# Patient Record
Sex: MALE | Race: WHITE | NOT HISPANIC OR LATINO | Employment: PART TIME | URBAN - METROPOLITAN AREA
[De-identification: names, ages, dates, MRNs, and addresses within clinical notes are randomized per-mention and may not be internally consistent; named-entity substitution may affect disease eponyms.]

---

## 2020-10-23 ENCOUNTER — APPOINTMENT (EMERGENCY)
Dept: RADIOLOGY | Facility: HOSPITAL | Age: 70
End: 2020-10-23
Payer: MEDICARE

## 2020-10-23 ENCOUNTER — HOSPITAL ENCOUNTER (EMERGENCY)
Facility: HOSPITAL | Age: 70
Discharge: HOME | End: 2020-10-23
Attending: EMERGENCY MEDICINE | Admitting: EMERGENCY MEDICINE
Payer: MEDICARE

## 2020-10-23 ENCOUNTER — APPOINTMENT (EMERGENCY)
Dept: CT IMAGING | Facility: HOSPITAL | Age: 70
End: 2020-10-23
Payer: MEDICARE

## 2020-10-23 VITALS
TEMPERATURE: 97.4 F | RESPIRATION RATE: 19 BRPM | HEIGHT: 69 IN | HEART RATE: 66 BPM | DIASTOLIC BLOOD PRESSURE: 77 MMHG | SYSTOLIC BLOOD PRESSURE: 132 MMHG | WEIGHT: 215 LBS | OXYGEN SATURATION: 97 % | BODY MASS INDEX: 31.84 KG/M2

## 2020-10-23 DIAGNOSIS — I10 HIGH BLOOD PRESSURE: ICD-10-CM

## 2020-10-23 DIAGNOSIS — R42 LIGHTHEADEDNESS: Primary | ICD-10-CM

## 2020-10-23 LAB
ALBUMIN SERPL BCP-MCNC: 4.1 G/DL (ref 3.5–5)
ALP SERPL-CCNC: 76 U/L (ref 46–116)
ALT SERPL W P-5'-P-CCNC: 22 U/L (ref 12–78)
ANION GAP SERPL CALCULATED.3IONS-SCNC: 4 MMOL/L (ref 4–13)
APTT PPP: 29 SECONDS (ref 23–37)
AST SERPL W P-5'-P-CCNC: 18 U/L (ref 5–45)
ATRIAL RATE: 70 BPM
BASOPHILS # BLD AUTO: 0.06 THOUSANDS/ΜL (ref 0–0.1)
BASOPHILS NFR BLD AUTO: 1 % (ref 0–1)
BILIRUB SERPL-MCNC: 0.8 MG/DL (ref 0.2–1)
BUN SERPL-MCNC: 13 MG/DL (ref 5–25)
CALCIUM SERPL-MCNC: 8.8 MG/DL (ref 8.3–10.1)
CHLORIDE SERPL-SCNC: 105 MMOL/L (ref 100–108)
CO2 SERPL-SCNC: 32 MMOL/L (ref 21–32)
CREAT SERPL-MCNC: 1.14 MG/DL (ref 0.6–1.3)
EOSINOPHIL # BLD AUTO: 0.15 THOUSAND/ΜL (ref 0–0.61)
EOSINOPHIL NFR BLD AUTO: 3 % (ref 0–6)
ERYTHROCYTE [DISTWIDTH] IN BLOOD BY AUTOMATED COUNT: 11.9 % (ref 11.6–15.1)
GFR SERPL CREATININE-BSD FRML MDRD: 64 ML/MIN/1.73SQ M
GLUCOSE SERPL-MCNC: 127 MG/DL (ref 65–140)
HCT VFR BLD AUTO: 45.3 % (ref 36.5–49.3)
HGB BLD-MCNC: 15.4 G/DL (ref 12–17)
IMM GRANULOCYTES # BLD AUTO: 0.02 THOUSAND/UL (ref 0–0.2)
IMM GRANULOCYTES NFR BLD AUTO: 0 % (ref 0–2)
INR PPP: 1.07 (ref 0.84–1.19)
LYMPHOCYTES # BLD AUTO: 1.61 THOUSANDS/ΜL (ref 0.6–4.47)
LYMPHOCYTES NFR BLD AUTO: 30 % (ref 14–44)
MCH RBC QN AUTO: 32.5 PG (ref 26.8–34.3)
MCHC RBC AUTO-ENTMCNC: 34 G/DL (ref 31.4–37.4)
MCV RBC AUTO: 96 FL (ref 82–98)
MONOCYTES # BLD AUTO: 0.56 THOUSAND/ΜL (ref 0.17–1.22)
MONOCYTES NFR BLD AUTO: 11 % (ref 4–12)
NEUTROPHILS # BLD AUTO: 2.94 THOUSANDS/ΜL (ref 1.85–7.62)
NEUTS SEG NFR BLD AUTO: 55 % (ref 43–75)
NRBC BLD AUTO-RTO: 0 /100 WBCS
P AXIS: 54 DEGREES
PLATELET # BLD AUTO: 205 THOUSANDS/UL (ref 149–390)
PMV BLD AUTO: 11.1 FL (ref 8.9–12.7)
POTASSIUM SERPL-SCNC: 3.9 MMOL/L (ref 3.5–5.3)
PR INTERVAL: 194 MS
PROT SERPL-MCNC: 7.5 G/DL (ref 6.4–8.2)
PROTHROMBIN TIME: 14 SECONDS (ref 11.6–14.5)
QRS AXIS: 39 DEGREES
QRSD INTERVAL: 98 MS
QT INTERVAL: 374 MS
QTC INTERVAL: 403 MS
RBC # BLD AUTO: 4.74 MILLION/UL (ref 3.88–5.62)
SODIUM SERPL-SCNC: 141 MMOL/L (ref 136–145)
T WAVE AXIS: 57 DEGREES
T4 FREE SERPL-MCNC: 1.21 NG/DL (ref 0.76–1.46)
TROPONIN I SERPL-MCNC: <0.02 NG/ML
TSH SERPL DL<=0.05 MIU/L-ACNC: 0.08 UIU/ML (ref 0.36–3.74)
VENTRICULAR RATE: 70 BPM
WBC # BLD AUTO: 5.34 THOUSAND/UL (ref 4.31–10.16)

## 2020-10-23 PROCEDURE — 80053 COMPREHEN METABOLIC PANEL: CPT | Performed by: EMERGENCY MEDICINE

## 2020-10-23 PROCEDURE — G1004 CDSM NDSC: HCPCS

## 2020-10-23 PROCEDURE — 84484 ASSAY OF TROPONIN QUANT: CPT | Performed by: EMERGENCY MEDICINE

## 2020-10-23 PROCEDURE — 84443 ASSAY THYROID STIM HORMONE: CPT | Performed by: EMERGENCY MEDICINE

## 2020-10-23 PROCEDURE — 70498 CT ANGIOGRAPHY NECK: CPT

## 2020-10-23 PROCEDURE — 84439 ASSAY OF FREE THYROXINE: CPT | Performed by: EMERGENCY MEDICINE

## 2020-10-23 PROCEDURE — 96360 HYDRATION IV INFUSION INIT: CPT

## 2020-10-23 PROCEDURE — 85025 COMPLETE CBC W/AUTO DIFF WBC: CPT | Performed by: EMERGENCY MEDICINE

## 2020-10-23 PROCEDURE — 71045 X-RAY EXAM CHEST 1 VIEW: CPT

## 2020-10-23 PROCEDURE — 96361 HYDRATE IV INFUSION ADD-ON: CPT

## 2020-10-23 PROCEDURE — 70496 CT ANGIOGRAPHY HEAD: CPT

## 2020-10-23 PROCEDURE — 85730 THROMBOPLASTIN TIME PARTIAL: CPT | Performed by: EMERGENCY MEDICINE

## 2020-10-23 PROCEDURE — 36415 COLL VENOUS BLD VENIPUNCTURE: CPT | Performed by: EMERGENCY MEDICINE

## 2020-10-23 PROCEDURE — 93005 ELECTROCARDIOGRAM TRACING: CPT

## 2020-10-23 PROCEDURE — 99285 EMERGENCY DEPT VISIT HI MDM: CPT | Performed by: EMERGENCY MEDICINE

## 2020-10-23 PROCEDURE — 85610 PROTHROMBIN TIME: CPT | Performed by: EMERGENCY MEDICINE

## 2020-10-23 PROCEDURE — 93010 ELECTROCARDIOGRAM REPORT: CPT | Performed by: INTERNAL MEDICINE

## 2020-10-23 PROCEDURE — 99285 EMERGENCY DEPT VISIT HI MDM: CPT

## 2020-10-23 RX ORDER — LEVOTHYROXINE SODIUM 112 UG/1
1 CAPSULE ORAL EVERY 24 HOURS
COMMUNITY
Start: 2020-07-30

## 2020-10-23 RX ORDER — LOVASTATIN 40 MG/1
1 TABLET ORAL EVERY 24 HOURS
COMMUNITY

## 2020-10-23 RX ORDER — FERROUS SULFATE 325(65) MG
TABLET ORAL EVERY 12 HOURS
COMMUNITY

## 2020-10-23 RX ORDER — LOSARTAN POTASSIUM 50 MG/1
1 TABLET ORAL EVERY 24 HOURS
COMMUNITY

## 2020-10-23 RX ADMIN — IOHEXOL 90 ML: 350 INJECTION, SOLUTION INTRAVENOUS at 08:14

## 2020-10-23 RX ADMIN — SODIUM CHLORIDE 1000 ML: 0.9 INJECTION, SOLUTION INTRAVENOUS at 06:49

## 2020-10-30 ENCOUNTER — TELEPHONE (OUTPATIENT)
Dept: NEUROLOGY | Facility: CLINIC | Age: 70
End: 2020-10-30

## 2021-02-26 ENCOUNTER — CONSULT (OUTPATIENT)
Dept: NEUROLOGY | Facility: CLINIC | Age: 71
End: 2021-02-26
Payer: MEDICARE

## 2021-02-26 VITALS
BODY MASS INDEX: 32.19 KG/M2 | WEIGHT: 218 LBS | DIASTOLIC BLOOD PRESSURE: 78 MMHG | SYSTOLIC BLOOD PRESSURE: 136 MMHG | HEART RATE: 59 BPM

## 2021-02-26 DIAGNOSIS — R42 LIGHTHEADEDNESS: ICD-10-CM

## 2021-02-26 DIAGNOSIS — R42 VERTIGO: Primary | ICD-10-CM

## 2021-02-26 PROCEDURE — 99203 OFFICE O/P NEW LOW 30 MIN: CPT | Performed by: PSYCHIATRY & NEUROLOGY

## 2021-02-26 RX ORDER — LEVOTHYROXINE SODIUM 137 UG/1
TABLET ORAL
COMMUNITY
Start: 2021-02-22 | End: 2021-03-31 | Stop reason: DRUGHIGH

## 2021-02-26 RX ORDER — LATANOPROST 50 UG/ML
SOLUTION/ DROPS OPHTHALMIC
COMMUNITY
Start: 2021-02-10

## 2021-02-26 NOTE — PROGRESS NOTES
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 [employer]  Pt had 2 episodes in same day  First episode several min and pt took water and resolved  Once back to re stocking, he had second event lasting longer in duration  No prior history of vertigo  Feeling off balance and also diff with navigating  Pt taken to er at [address] right from work place  Pt had cta head and neck in er without evidence of acute intracranial abn  No hemodyn significant stenosis at the cervical carotid bifurcation or the vertebral artery origins  No large vessel occlusion or high grade stenosis  Pt notes sxs resolved  Pt with some int tinnitus  Pt to complete cns work up with mri head with attn to iacs  Pt to follow up after study  Pt aware to go to er for any any other associated sxs with the vertigo, ie weaknes, diplopia, headache or focal neuro sxs       Diagnoses and all orders for this visit:    Vertigo  -     MRI brain IAC wo contrast; Future    Lightheadedness  -     Ambulatory referral to Neurology    Other orders  -     levothyroxine 137 mcg tablet  -     latanoprost (XALATAN) 0 005 % ophthalmic solution; place 1 drop into both eyes at bedtime           Subjective:    HPI    Pt is a 66 yo f with pmh of hyperchol, htn, hypothyroidism , and hailey who presents today for eval of vertigo  Pt had episode on oct 23 2020 while working at [employer]  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, and had acute onset of lightheadness and feeling off balance  Pt notes initially sxs lasted a few minutes and pt was able to go to work room and get fluids  Pt returned to re stocking and same issue recurred with some vertiginous sensation and more peristent sxs without resolution  Pt had some diff with ambulation but able to articulate his sxs and ambulance called to the store     Pt notes sxs subsequently resolved  No recent recurrence  No prior vertigo in yrs past   Pt notes int tinnitus  No hearing loss per pt  Pt denies any uri sxs  No recent infections  No falls or trips  No loc  No sz  No change in vision  No loss of vision  No change in bowel or bladder  No change in speech or swallowing  No ha or n or v  Pt was seen in er,  bp ok  Pt had cta head and neck done in er  No acute intracranial abnormality  Moderate cerebral chronic microangiopathic disease  No cervical or intracranial large vessel occlusion or high-grade stenosis  No hemodynamically significant stenosis at the cervical carotid bifurcations or the vertebral artery origins  Rev in detail results of cta study  Pt notes he quit tob in 2009  Pt notes he usually is fairly well hydrated while working  Appears to have had a positional component to his sxs with the stocking of shelves  Pt recommended to complete neuro work up with mri head with attn to iacs  If any recurrence without any other focality, rec ent eval as well  Pt currently feels at his baseline  Pt recalls seeing a vascular doc several yrs ago but unsure of reason at that time  No history of cva or tia like sxs  Rev with pt indications for return to er in future with any focal neuro sxs, headache, weakness, change in vision , speech or swallowing ,etc       The following portions of the patient's history were reviewed and updated as appropriate: allergies, current medications, past family history, past medical history, past social history, past surgical history and problem list and ,med rec and ros rev  Objective:    Blood pressure 136/78, pulse 59, weight 98 9 kg (218 lb)  Physical Exam  Constitutional:       General: He is not in acute distress  Appearance: He is not ill-appearing  Eyes:      General: Lids are normal       Extraocular Movements: Extraocular movements intact        Pupils: Pupils are equal, round, and reactive to light    Musculoskeletal:      Right lower leg: No edema  Left lower leg: No edema  Neurological:      Mental Status: He is alert  Coordination: Coordination is intact  Deep Tendon Reflexes: Strength normal and reflexes are normal and symmetric  Psychiatric:         Speech: Speech normal          Neurological Exam  Mental Status  Alert  Recent and remote memory are intact  Speech is normal  Language is fluent with no aphasia  Fund of knowledge is appropriate for level of education  Cranial Nerves  CN II: Visual acuity is normal  Visual fields full to confrontation  CN III, IV, VI: Extraocular movements intact bilaterally  Normal lids and orbits bilaterally  Pupils equal round and reactive to light bilaterally  CN V: Facial sensation is normal   CN VII: Full and symmetric facial movement  CN VIII: Hearing is normal   CN IX, X: Palate elevates symmetrically  Normal gag reflex  CN XI: Shoulder shrug strength is normal   CN XII: Tongue midline without atrophy or fasciculations  Motor  Normal muscle bulk throughout  Normal muscle tone  No abnormal involuntary movements  Strength is 5/5 throughout all four extremities  Sensory  Sensation is intact to light touch, pinprick, vibration and proprioception in all four extremities  Reflexes  Deep tendon reflexes are 2+ and symmetric in all four extremities with downgoing toes bilaterally  Coordination  Finger-to-nose, rapid alternating movements and heel-to-shin normal bilaterally without dysmetria  Gait  Normal casual, toe, heel and tandem gait  ROS:    Review of Systems   Constitutional: Negative  Negative for appetite change and fever  HENT: Positive for tinnitus  Negative for hearing loss, trouble swallowing and voice change  Eyes: Negative  Negative for photophobia and pain  Respiratory: Negative  Negative for shortness of breath  Cardiovascular: Negative  Negative for palpitations  Gastrointestinal: Negative  Negative for nausea and vomiting  Endocrine: Negative  Negative for cold intolerance  Genitourinary: Negative  Negative for dysuria, frequency and urgency  Musculoskeletal: Positive for gait problem  Negative for myalgias and neck pain  Skin: Negative  Negative for rash  Neurological: Positive for dizziness and light-headedness  Negative for tremors, seizures, syncope, facial asymmetry, speech difficulty, weakness, numbness and headaches  Hematological: Negative  Does not bruise/bleed easily  Psychiatric/Behavioral: Negative  Negative for confusion, hallucinations and sleep disturbance

## 2021-02-26 NOTE — ASSESSMENT & PLAN NOTE
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  Pt had 2 episodes in same day  First episode several min and pt took water and resolved  Once back to re stocking, he had second event lasting longer in duration  No prior history of vertigo  Feeling off balance and also diff with navigating  Pt taken to er at [address] right from work place  Pt had cta head and neck in er without evidence of acute intracranial abn    No hemodyn significant stenosis at the cervical carotid bifurcation or the vertebral artery origins  No large vessel occlusion or high grade stenosis  Pt notes sxs resolved  Pt with some int tinnitus  Pt to complete cns work up with mri head with attn to iacs  Pt to follow up after study  Pt aware to go to er for any any other associated sxs with the vertigo, ie weaknes, diplopia, headache or focal neuro sxs

## 2021-03-10 ENCOUNTER — TELEPHONE (OUTPATIENT)
Dept: NEUROLOGY | Facility: CLINIC | Age: 71
End: 2021-03-10

## 2021-03-10 NOTE — TELEPHONE ENCOUNTER
Patient questioning if he needed labwork prior to his MRI on 3/17  Advised him since he is not getting contrast, he does not need to get labs done  Patient verbalized understanding

## 2021-03-17 ENCOUNTER — HOSPITAL ENCOUNTER (OUTPATIENT)
Dept: MRI IMAGING | Facility: HOSPITAL | Age: 71
Discharge: HOME | End: 2021-03-17
Attending: PSYCHIATRY & NEUROLOGY
Payer: MEDICARE

## 2021-03-17 DIAGNOSIS — R42 VERTIGO: ICD-10-CM

## 2021-03-17 PROCEDURE — 70551 MRI BRAIN STEM W/O DYE: CPT

## 2021-03-17 PROCEDURE — G1004 CDSM NDSC: HCPCS

## 2021-03-22 ENCOUNTER — TELEPHONE (OUTPATIENT)
Dept: NEUROLOGY | Facility: CLINIC | Age: 71
End: 2021-03-22

## 2021-03-22 NOTE — TELEPHONE ENCOUNTER
Patient returned call  I advised him of results and recommendations  Patient verbalized understanding  MRI results faxed to PCP

## 2021-03-22 NOTE — TELEPHONE ENCOUNTER
Called and Left a message on pt's answering machine for a call back    Copy of MRI sent to PCP on file

## 2021-03-22 NOTE — TELEPHONE ENCOUNTER
----- Message from Tony Nair MD sent at 3/22/2021  6:41 AM EDT -----  Nursing, Let pt know mri head no abn seen in the posterior fossa or iacs  Chronic small vessel disease and mild volume loss noted  To consider baby asa for small vessel changes if ok with pcp  Small vessel changes unrelated to his vertigo  Please send copy of mri to pcp, she is not in epic system

## 2021-03-23 NOTE — TELEPHONE ENCOUNTER
Patient called regarding recommendation to take baby aspirin  Patient was told to get his PCP's approval on this recommendation (see below)  Patient reports his PCP returned his call and told him that they are deferring approval to   Dr Dejah Bar  I read recommendation to patient again and confirmed that the PCP needs to give their approval  Patient verbalized understanding and will follow up with his PCP

## 2021-03-25 ENCOUNTER — TELEPHONE (OUTPATIENT)
Dept: NEUROLOGY | Facility: CLINIC | Age: 71
End: 2021-03-25

## 2021-03-25 NOTE — TELEPHONE ENCOUNTER
Left message confirming that appointment with Dr Tr Giron on 3/31/21 at 8 am in the [facility] office  Asking for patient to return the call the confirm that they will be keeping the appointment  Please discuss and document

## 2021-03-31 ENCOUNTER — OFFICE VISIT (OUTPATIENT)
Dept: NEUROLOGY | Facility: CLINIC | Age: 71
End: 2021-03-31
Payer: MEDICARE

## 2021-03-31 VITALS
DIASTOLIC BLOOD PRESSURE: 68 MMHG | SYSTOLIC BLOOD PRESSURE: 114 MMHG | HEART RATE: 68 BPM | WEIGHT: 229 LBS | BODY MASS INDEX: 33.82 KG/M2

## 2021-03-31 DIAGNOSIS — R42 VERTIGO: Primary | ICD-10-CM

## 2021-03-31 PROCEDURE — 99213 OFFICE O/P EST LOW 20 MIN: CPT | Performed by: PSYCHIATRY & NEUROLOGY

## 2021-03-31 RX ORDER — LEVOTHYROXINE SODIUM 112 UG/1
112 TABLET ORAL EVERY MORNING
COMMUNITY
Start: 2021-03-18

## 2021-03-31 NOTE — ASSESSMENT & PLAN NOTE
Pt here for neuro follow up  Pt notes near resolution of his vertigo  Now only very briefly and usually with change of position  Pt notes no falls or trips  No infections  No hospitalizations  Exam stable  Pt had updated mri head on 3/17/21 and evidence of small vessel disease, no acute pathology in iacs  No sinus disease  Rev study with pt in detail  Pt to follow up prn  Pt on asa for small vessel changes  Pt also checked with pcp office as well

## 2021-03-31 NOTE — PROGRESS NOTES
Patient ID: Sylvie Salinas is a 65 y o  male  Assessment/Plan:    Vertigo  Pt here for neuro follow up  Pt notes near resolution of his vertigo  Now only very briefly and usually with change of position  Pt notes no falls or trips  No infections  No hospitalizations  Exam stable  Pt had updated mri head on 3/17/21 and evidence of small vessel disease, no acute pathology in iacs  No sinus disease  Rev study with pt in detail  Pt to follow up prn  Pt on asa for small vessel changes  Pt also checked with pcp office as well       Diagnoses and all orders for this visit:    Vertigo    Other orders  -     levothyroxine 112 mcg tablet; Take 112 mcg by mouth every morning Take on an empty stomach           Subjective:    HPI    Pt is a 64 yo f with pmh of hyperchol, htn, hypothyroidism , and hailey who presents today for eval of vertigo  Pt last seen 2/26/21  Per my last note"  Pt had episode on oct 23 2020 while working at [employer]  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, and had acute onset of lightheadness and feeling off balance  Pt notes initially sxs lasted a few minutes and pt was able to go to work room and get fluids  Pt returned to re stocking and same issue recurred with some vertiginous sensation and more peristent sxs without resolution  Pt had some diff with ambulation but able to articulate his sxs and ambulance called to the store  Pt notes sxs subsequently resolved  No recent recurrence  No prior vertigo in yrs past   Pt notes int tinnitus  No hearing loss per pt  Pt was seen in er,  bp ok  Pt had cta head and neck done in er  No acute intracranial abnormality   Moderate cerebral chronic microangiopathic disease  No cervical or intracranial large vessel occlusion or high-grade stenosis  No hemodynamically significant stenosis at the cervical carotid bifurcations or the vertebral artery origins  Rev in detail results of cta study     Pt notes he quit tob in 2009    Pt notes he usually is fairly well hydrated while working  Appears to have had a positional component to his sxs with the stocking of shelves "    Kept above detail of initial consultation  Pt notes no new issues since last visit in feb  Pt only notes occ brief dizziness with turning  sxs very short lived  No other associated sxs  Pt denies any new sxs  No falls or trips  No change in bowel or bladder  No LOC, no seizure  No change in speech or swallowing  No change in vision  No loss of vision  No diplopia  No loss of vision  No headache, no nausea or vomiting  No recent hospitalizations  No recent infections  No fever or chills  No cough or sob  Pt notes close covid contacts  Pt well aware of cdc recommendations in setting of covid  Pt had mri head 3/17/21 with attn to iacs  No acute intracranial pathology  Chronic microangiopathy  Unremarkable noncontrast evaluation of the internal auditory canals  Rev study in detail with pt  Pt denies any tia or cva like sxs  Pt feels well  Pt has added asa daily due to small vessel changes on mri  Pt also called pcp to discuss as well  Per pt no medical contraindications to asa  No posterior fossa pathology seen  Brief vertigo likely related to possible bpv  If recurrence, rec ent eval as well                The following portions of the patient's history were reviewed and updated as appropriate: allergies, current medications, past family history, past medical history, past social history, past surgical history and problem list and med rec and ros rev  Objective:    Blood pressure 114/68, pulse 68, weight 104 kg (229 lb)  Physical Exam  Constitutional:       General: He is not in acute distress  Appearance: He is not ill-appearing  Eyes:      General: Lids are normal       Extraocular Movements: Extraocular movements intact  Pupils: Pupils are equal, round, and reactive to light     Musculoskeletal:      Right lower leg: No edema  Left lower leg: No edema  Neurological:      Mental Status: He is alert  Deep Tendon Reflexes: Strength normal and reflexes are normal and symmetric  Psychiatric:         Speech: Speech normal          Neurological Exam  Mental Status  Alert  Recent and remote memory are intact  Speech is normal  Language is fluent with no aphasia  Attention and concentration are normal     Cranial Nerves  CN II: Visual acuity is normal  Visual fields full to confrontation  Right funduscopic exam: disc intact  Left funduscopic exam: disc intact  CN III, IV, VI: Extraocular movements intact bilaterally  Normal lids and orbits bilaterally  Pupils equal round and reactive to light bilaterally  CN V: Facial sensation is normal   CN VII: Full and symmetric facial movement  CN VIII: Hearing is normal   CN IX, X: Palate elevates symmetrically  Normal gag reflex  CN XI: Shoulder shrug strength is normal   CN XII: Tongue midline without atrophy or fasciculations  Motor  Normal muscle bulk throughout  Normal muscle tone  No abnormal involuntary movements  Strength is 5/5 throughout all four extremities  Sensory  Sensation is intact to light touch, pinprick, vibration and proprioception in all four extremities  Reflexes  Deep tendon reflexes are 2+ and symmetric in all four extremities with downgoing toes bilaterally  Coordination  Right: Finger-to-nose normal  Rapid alternating movement normal   Left: Finger-to-nose normal  Rapid alternating movement normal     Gait  Normal casual, toe, heel and tandem gait  ROS:    Review of Systems   Constitutional: Negative  Negative for appetite change and fever  HENT: Negative  Negative for hearing loss, tinnitus, trouble swallowing and voice change  Eyes: Negative  Negative for photophobia and pain  Respiratory: Negative  Negative for shortness of breath  Cardiovascular: Negative  Negative for palpitations     Gastrointestinal: Negative  Negative for nausea and vomiting  Endocrine: Negative  Negative for cold intolerance  Genitourinary: Negative  Negative for dysuria, frequency and urgency  Musculoskeletal: Negative  Negative for myalgias and neck pain  Skin: Negative  Negative for rash  Neurological: Negative  Negative for dizziness, tremors, seizures, syncope, facial asymmetry, speech difficulty, weakness, light-headedness, numbness and headaches  Hematological: Negative  Does not bruise/bleed easily  Psychiatric/Behavioral: Negative  Negative for confusion, hallucinations and sleep disturbance

## 2022-03-20 ENCOUNTER — HOSPITAL ENCOUNTER (EMERGENCY)
Facility: HOSPITAL | Age: 72
Discharge: HOME | End: 2022-03-20
Attending: EMERGENCY MEDICINE
Payer: MEDICARE

## 2022-03-20 VITALS
SYSTOLIC BLOOD PRESSURE: 156 MMHG | BODY MASS INDEX: 32.58 KG/M2 | DIASTOLIC BLOOD PRESSURE: 91 MMHG | OXYGEN SATURATION: 95 % | HEART RATE: 75 BPM | WEIGHT: 220 LBS | TEMPERATURE: 97.9 F | HEIGHT: 69 IN | RESPIRATION RATE: 16 BRPM

## 2022-03-20 DIAGNOSIS — R09.81 NASAL CONGESTION: Primary | ICD-10-CM

## 2022-03-20 PROCEDURE — 99283 EMERGENCY DEPT VISIT LOW MDM: CPT | Performed by: EMERGENCY MEDICINE

## 2022-03-20 PROCEDURE — 99282 EMERGENCY DEPT VISIT SF MDM: CPT

## 2022-03-20 RX ORDER — FLUTICASONE PROPIONATE 50 MCG
1 SPRAY, SUSPENSION (ML) NASAL DAILY
Qty: 16 G | Refills: 0 | Status: SHIPPED | OUTPATIENT
Start: 2022-03-20

## 2022-03-20 NOTE — Clinical Note
Flores Moreno was seen and treated in our emergency department on 3/20/2022  No restrictions            Diagnosis:     Servando Mcintyre  may return to work on return date  He may return on this date: 03/20/2022         If you have any questions or concerns, please don't hesitate to call        Vicky Childress, DO    ______________________________           _______________          _______________  Hospital Representative                              Date                                Time

## 2022-03-20 NOTE — DISCHARGE INSTRUCTIONS
Please follow up with your primary care provider for further care, if symptoms worsen please return to the emergency department

## 2022-03-20 NOTE — ED PROVIDER NOTES
History  Chief Complaint   Patient presents with    Nasal Congestion     started yesterday, concerned about new covid variant; denies fever/cough     66-year-old male past medical history of hyperlipidemia, hypertension presents for evaluation of nasal congestion x1 day, no chest pain, no shortness of breath, no nausea, no vomiting, no diarrhea, no sick contacts  No fever at home  Patient was worried about the new COVID variant that he read about and came in for evaluation prior to going to work at  [employer]  No medications taken prior to arrival   COVID positive in 2022 (22)          Prior to Admission Medications   Prescriptions Last Dose Informant Patient Reported? Taking? Cyanocobalamin 1000 MCG SUBL   Yes No   Sig: every 24 hours   Levothyroxine Sodium 112 MCG CAPS   Yes No   Sig: Take 1 capsule by mouth every 24 hours   ferrous sulfate 325 (65 Fe) mg tablet   Yes No   Sig: Every 12 hours   latanoprost (XALATAN) 0 005 % ophthalmic solution   Yes No   Sig: place 1 drop into both eyes at bedtime   levothyroxine 112 mcg tablet   Yes No   Sig: Take 112 mcg by mouth every morning Take on an empty stomach   losartan (COZAAR) 50 mg tablet   Yes No   Sig: Take 1 tablet by mouth every 24 hours   lovastatin (MEVACOR) 40 MG tablet   Yes No   Sig: Take 1 tablet by mouth every 24 hours      Facility-Administered Medications: None       Past Medical History:   Diagnosis Date    Disease of thyroid gland     Hyperlipidemia     Hypertension        Past Surgical History:   Procedure Laterality Date    HERNIA REPAIR      REPLACEMENT TOTAL KNEE BILATERAL         History reviewed  No pertinent family history  I have reviewed and agree with the history as documented      E-Cigarette/Vaping     E-Cigarette/Vaping Substances     Social History     Tobacco Use    Smoking status: Former Smoker     Quit date: 10/23/2009     Years since quittin 4    Smokeless tobacco: Never Used   Substance Use Topics    Alcohol use: Yes     Comment: socially    Drug use: Never       Review of Systems   Constitutional: Negative for chills and fever  HENT: Positive for congestion  Negative for rhinorrhea and sore throat  Respiratory: Negative for cough and shortness of breath  Cardiovascular: Negative for chest pain and palpitations  Gastrointestinal: Negative for abdominal pain, nausea and vomiting  Genitourinary: Negative for dysuria, frequency and urgency  Physical Exam  Physical Exam  Vitals and nursing note reviewed  Constitutional:       Appearance: He is well-developed  HENT:      Head: Normocephalic and atraumatic  Right Ear: Tympanic membrane normal       Left Ear: Tympanic membrane normal       Nose: No congestion or rhinorrhea  Mouth/Throat:      Pharynx: No oropharyngeal exudate or posterior oropharyngeal erythema  Cardiovascular:      Rate and Rhythm: Normal rate and regular rhythm  Heart sounds: Normal heart sounds  Pulmonary:      Effort: Pulmonary effort is normal       Breath sounds: Normal breath sounds  Abdominal:      General: There is no distension  Palpations: Abdomen is soft  Tenderness: There is no abdominal tenderness  Skin:     General: Skin is warm and dry  Neurological:      Mental Status: He is alert and oriented to person, place, and time           Vital Signs  ED Triage Vitals [03/20/22 0443]   Temperature Pulse Respirations Blood Pressure SpO2   97 9 °F (36 6 °C) 75 16 156/91 95 %      Temp Source Heart Rate Source Patient Position - Orthostatic VS BP Location FiO2 (%)   Temporal Monitor Sitting Left arm --      Pain Score       No Pain           Vitals:    03/20/22 0443   BP: 156/91   Pulse: 75   Patient Position - Orthostatic VS: Sitting         Visual Acuity      ED Medications  Medications - No data to display    Diagnostic Studies  Results Reviewed     None                 No orders to display              Procedures  Procedures ED Course                                             MDM  Number of Diagnoses or Management Options  Nasal congestion  Diagnosis management comments: 76-year-old male presents for evaluation of nasal congestion otherwise asymptomatic, does have allergies that he typically gets around this time of the year, had COVID less than 3 months ago will defer COVID testing at this time, discussed low suspicion for COVID infection given his symptoms and recent infection  Will discharge with symptomatic relief      Disposition  Final diagnoses:   Nasal congestion     Time reflects when diagnosis was documented in both MDM as applicable and the Disposition within this note     Time User Action Codes Description Comment    3/20/2022  4:52 AM Sunitha Del Real Add [R09 81] Nasal congestion       ED Disposition     ED Disposition Condition Date/Time Comment    Discharge Stable Sun Mar 20, 2022  4:54 AM Christine Baca discharge to home/self care              Follow-up Information     Follow up With Specialties Details Why Contact Info Additional Information    Basia Delcid DO Family Medicine Schedule an appointment as soon as possible for a visit   [facility] [address] [facility] Emergency Department Emergency Medicine  If symptoms worsen [address] [address] [phone] Pod [facility] [address] Emergency Department, [address] [facility] , Tamir [address]          Discharge Medication List as of 3/20/2022  4:56 AM      START taking these medications    Details   fluticasone (FLONASE) 50 mcg/act nasal spray 1 spray into each nostril daily, Starting Sun 3/20/2022, Normal         CONTINUE these medications which have NOT CHANGED    Details   Cyanocobalamin 1000 MCG SUBL every 24 hours, Starting Thu 7/30/2020, Historical Med      ferrous sulfate 325 (65 Fe) mg tablet Every 12 hours, Historical Med latanoprost (XALATAN) 0 005 % ophthalmic solution place 1 drop into both eyes at bedtime, Historical Med      levothyroxine 112 mcg tablet Take 112 mcg by mouth every morning Take on an empty stomach, Starting u 3/18/2021, Historical Med      Levothyroxine Sodium 112 MCG CAPS Take 1 capsule by mouth every 24 hours, Starting u 7/30/2020, Historical Med      losartan (COZAAR) 50 mg tablet Take 1 tablet by mouth every 24 hours, Historical Med      lovastatin (MEVACOR) 40 MG tablet Take 1 tablet by mouth every 24 hours, Historical Med             No discharge procedures on file      PDMP Review     None          ED Provider  Electronically Signed by           Sonja Randolph DO  03/20/22 3168

## 2022-03-20 NOTE — Clinical Note
Babs Merritt was seen and treated in our emergency department on 3/20/2022  No restrictions            Diagnosis:     Clarisa Bose  may return to work on return date  He may return on this date: 03/20/2022         If you have any questions or concerns, please don't hesitate to call        Lanny Mercer DO    ______________________________           _______________          _______________  Hospital Representative                              Date                                Time

## 2023-06-10 ENCOUNTER — HOSPITAL ENCOUNTER (EMERGENCY)
Facility: HOSPITAL | Age: 73
Discharge: HOME | DRG: 643 | End: 2023-06-10
Attending: EMERGENCY MEDICINE | Admitting: EMERGENCY MEDICINE
Payer: MEDICARE

## 2023-06-10 ENCOUNTER — APPOINTMENT (EMERGENCY)
Dept: RADIOLOGY | Facility: HOSPITAL | Age: 73
DRG: 643 | End: 2023-06-10
Payer: MEDICARE

## 2023-06-10 ENCOUNTER — APPOINTMENT (EMERGENCY)
Dept: CT IMAGING | Facility: HOSPITAL | Age: 73
DRG: 643 | End: 2023-06-10
Payer: MEDICARE

## 2023-06-10 VITALS
SYSTOLIC BLOOD PRESSURE: 155 MMHG | OXYGEN SATURATION: 95 % | TEMPERATURE: 97.9 F | HEART RATE: 75 BPM | DIASTOLIC BLOOD PRESSURE: 86 MMHG | RESPIRATION RATE: 16 BRPM

## 2023-06-10 DIAGNOSIS — Z53.29 LEFT AGAINST MEDICAL ADVICE: ICD-10-CM

## 2023-06-10 DIAGNOSIS — E03.9 HYPOTHYROIDISM: ICD-10-CM

## 2023-06-10 DIAGNOSIS — Z91.14 NONCOMPLIANCE WITH MEDICATION REGIMEN: ICD-10-CM

## 2023-06-10 DIAGNOSIS — R41.3 MEMORY LOSS: Primary | ICD-10-CM

## 2023-06-10 LAB
2HR DELTA HS TROPONIN: 0 NG/L
ALBUMIN SERPL BCP-MCNC: 4.9 G/DL (ref 3.5–5)
ALP SERPL-CCNC: 54 U/L (ref 34–104)
ALT SERPL W P-5'-P-CCNC: 34 U/L (ref 7–52)
AMPHETAMINES SERPL QL SCN: NEGATIVE
ANION GAP SERPL CALCULATED.3IONS-SCNC: 8 MMOL/L (ref 4–13)
APAP SERPL-MCNC: <10 UG/ML (ref 10–20)
APTT PPP: 31 SECONDS (ref 23–37)
AST SERPL W P-5'-P-CCNC: 54 U/L (ref 13–39)
ATRIAL RATE: 60 BPM
BACTERIA UR QL AUTO: ABNORMAL /HPF
BARBITURATES UR QL: NEGATIVE
BASOPHILS # BLD AUTO: 0.03 THOUSANDS/ÂΜL (ref 0–0.1)
BASOPHILS NFR BLD AUTO: 1 % (ref 0–1)
BENZODIAZ UR QL: NEGATIVE
BILIRUB SERPL-MCNC: 2.66 MG/DL (ref 0.2–1)
BILIRUB UR QL STRIP: ABNORMAL
BUN SERPL-MCNC: 19 MG/DL (ref 5–25)
CALCIUM SERPL-MCNC: 9.7 MG/DL (ref 8.4–10.2)
CARDIAC TROPONIN I PNL SERPL HS: 6 NG/L
CARDIAC TROPONIN I PNL SERPL HS: 6 NG/L
CHLORIDE SERPL-SCNC: 101 MMOL/L (ref 96–108)
CLARITY UR: CLEAR
CO2 SERPL-SCNC: 30 MMOL/L (ref 21–32)
COCAINE UR QL: NEGATIVE
COLOR UR: ABNORMAL
CREAT SERPL-MCNC: 1.59 MG/DL (ref 0.6–1.3)
EOSINOPHIL # BLD AUTO: 0.04 THOUSAND/ÂΜL (ref 0–0.61)
EOSINOPHIL NFR BLD AUTO: 1 % (ref 0–6)
ERYTHROCYTE [DISTWIDTH] IN BLOOD BY AUTOMATED COUNT: 14.4 % (ref 11.6–15.1)
ETHANOL SERPL-MCNC: <10 MG/DL
FLUAV RNA RESP QL NAA+PROBE: NEGATIVE
FLUBV RNA RESP QL NAA+PROBE: NEGATIVE
GFR SERPL CREATININE-BSD FRML MDRD: 42 ML/MIN/1.73SQ M
GLUCOSE SERPL-MCNC: 118 MG/DL (ref 65–140)
GLUCOSE UR STRIP-MCNC: NEGATIVE MG/DL
HCT VFR BLD AUTO: 47 % (ref 36.5–49.3)
HGB BLD-MCNC: 15.4 G/DL (ref 12–17)
HGB UR QL STRIP.AUTO: NEGATIVE
HYALINE CASTS #/AREA URNS LPF: ABNORMAL /LPF
IMM GRANULOCYTES # BLD AUTO: 0.01 THOUSAND/UL (ref 0–0.2)
IMM GRANULOCYTES NFR BLD AUTO: 0 % (ref 0–2)
INR PPP: 1 (ref 0.84–1.19)
KETONES UR STRIP-MCNC: NEGATIVE MG/DL
LEUKOCYTE ESTERASE UR QL STRIP: ABNORMAL
LYMPHOCYTES # BLD AUTO: 1.14 THOUSANDS/ÂΜL (ref 0.6–4.47)
LYMPHOCYTES NFR BLD AUTO: 23 % (ref 14–44)
MCH RBC QN AUTO: 31.8 PG (ref 26.8–34.3)
MCHC RBC AUTO-ENTMCNC: 32.8 G/DL (ref 31.4–37.4)
MCV RBC AUTO: 97 FL (ref 82–98)
METHADONE UR QL: NEGATIVE
MONOCYTES # BLD AUTO: 0.42 THOUSAND/ÂΜL (ref 0.17–1.22)
MONOCYTES NFR BLD AUTO: 9 % (ref 4–12)
NEUTROPHILS # BLD AUTO: 3.29 THOUSANDS/ÂΜL (ref 1.85–7.62)
NEUTS SEG NFR BLD AUTO: 66 % (ref 43–75)
NITRITE UR QL STRIP: NEGATIVE
NON-SQ EPI CELLS URNS QL MICRO: ABNORMAL /HPF
NRBC BLD AUTO-RTO: 0 /100 WBCS
OPIATES UR QL SCN: NEGATIVE
OXYCODONE+OXYMORPHONE UR QL SCN: NEGATIVE
P AXIS: 75 DEGREES
PCP UR QL: NEGATIVE
PH UR STRIP.AUTO: 6 [PH]
PLATELET # BLD AUTO: 188 THOUSANDS/UL (ref 149–390)
PMV BLD AUTO: 10.6 FL (ref 8.9–12.7)
POTASSIUM SERPL-SCNC: 3.6 MMOL/L (ref 3.5–5.3)
PR INTERVAL: 252 MS
PROT SERPL-MCNC: 7.6 G/DL (ref 6.4–8.4)
PROT UR STRIP-MCNC: ABNORMAL MG/DL
PROTHROMBIN TIME: 13.9 SECONDS (ref 11.6–14.5)
QRS AXIS: 3 DEGREES
QRSD INTERVAL: 86 MS
QT INTERVAL: 448 MS
QTC INTERVAL: 448 MS
RBC # BLD AUTO: 4.84 MILLION/UL (ref 3.88–5.62)
RBC #/AREA URNS AUTO: ABNORMAL /HPF
RSV RNA RESP QL NAA+PROBE: NEGATIVE
SALICYLATES SERPL-MCNC: <5 MG/DL (ref 3–20)
SARS-COV-2 RNA RESP QL NAA+PROBE: NEGATIVE
SODIUM SERPL-SCNC: 139 MMOL/L (ref 135–147)
SP GR UR STRIP.AUTO: >=1.03 (ref 1–1.03)
T WAVE AXIS: 51 DEGREES
THC UR QL: NEGATIVE
TSH SERPL DL<=0.05 MIU/L-ACNC: 135.02 UIU/ML (ref 0.45–4.5)
UROBILINOGEN UR STRIP-ACNC: 8 MG/DL
VENTRICULAR RATE: 60 BPM
WBC # BLD AUTO: 4.93 THOUSAND/UL (ref 4.31–10.16)
WBC #/AREA URNS AUTO: ABNORMAL /HPF

## 2023-06-10 PROCEDURE — 85610 PROTHROMBIN TIME: CPT | Performed by: EMERGENCY MEDICINE

## 2023-06-10 PROCEDURE — 80307 DRUG TEST PRSMV CHEM ANLYZR: CPT | Performed by: EMERGENCY MEDICINE

## 2023-06-10 PROCEDURE — 84443 ASSAY THYROID STIM HORMONE: CPT | Performed by: EMERGENCY MEDICINE

## 2023-06-10 PROCEDURE — 99284 EMERGENCY DEPT VISIT MOD MDM: CPT

## 2023-06-10 PROCEDURE — 36415 COLL VENOUS BLD VENIPUNCTURE: CPT | Performed by: EMERGENCY MEDICINE

## 2023-06-10 PROCEDURE — 85025 COMPLETE CBC W/AUTO DIFF WBC: CPT | Performed by: EMERGENCY MEDICINE

## 2023-06-10 PROCEDURE — 80179 DRUG ASSAY SALICYLATE: CPT | Performed by: EMERGENCY MEDICINE

## 2023-06-10 PROCEDURE — 0241U HB NFCT DS VIR RESP RNA 4 TRGT: CPT | Performed by: EMERGENCY MEDICINE

## 2023-06-10 PROCEDURE — 71045 X-RAY EXAM CHEST 1 VIEW: CPT

## 2023-06-10 PROCEDURE — 84484 ASSAY OF TROPONIN QUANT: CPT | Performed by: EMERGENCY MEDICINE

## 2023-06-10 PROCEDURE — 85730 THROMBOPLASTIN TIME PARTIAL: CPT | Performed by: EMERGENCY MEDICINE

## 2023-06-10 PROCEDURE — 82077 ASSAY SPEC XCP UR&BREATH IA: CPT | Performed by: EMERGENCY MEDICINE

## 2023-06-10 PROCEDURE — 80143 DRUG ASSAY ACETAMINOPHEN: CPT | Performed by: EMERGENCY MEDICINE

## 2023-06-10 PROCEDURE — 80053 COMPREHEN METABOLIC PANEL: CPT | Performed by: EMERGENCY MEDICINE

## 2023-06-10 PROCEDURE — 93005 ELECTROCARDIOGRAM TRACING: CPT

## 2023-06-10 PROCEDURE — 70450 CT HEAD/BRAIN W/O DYE: CPT

## 2023-06-10 PROCEDURE — 81001 URINALYSIS AUTO W/SCOPE: CPT | Performed by: EMERGENCY MEDICINE

## 2023-06-10 PROCEDURE — 93010 ELECTROCARDIOGRAM REPORT: CPT | Performed by: INTERNAL MEDICINE

## 2023-06-10 RX ORDER — LEVOTHYROXINE SODIUM 112 UG/1
112 TABLET ORAL
Status: DISCONTINUED | OUTPATIENT
Start: 2023-06-11 | End: 2023-06-10

## 2023-06-10 RX ORDER — LEVOTHYROXINE SODIUM 112 UG/1
112 TABLET ORAL ONCE
Status: COMPLETED | OUTPATIENT
Start: 2023-06-10 | End: 2023-06-10

## 2023-06-10 RX ADMIN — LEVOTHYROXINE SODIUM 112 MCG: 112 TABLET ORAL at 16:40

## 2023-06-10 NOTE — ED NOTES
Provider at bedside with crisis worker  Provider educated pt on results  Provider educated pt on the need to take his thyroid medications  Pt verbalized understanding and reiterated the need to take thyroid medication  Pt denies any S/I,H/I,A/H,V/H and does not want to stay at the hospital  Pt verbalized he will take medications at home   Pt AO x3     Alonso Mejia, RN  06/10/23 [id number]

## 2023-06-10 NOTE — ED TRIAGE NOTES
"Per EMS \"pt drove to work at [employer], and co workers were worried about statements pt was saying  Per police pt states he was going to sell all his things because he wouldn't need them anymore and that he would run into traffic  While with EMS never made SI threats stating issues with living situation due to living with ex wife whom he cares about but he thinks if he does what she wants she would want him again\"  Pt denies SI/HI/AH/VH currently   When asked if pt feels safe at home pt states \"yes\" when asked if pt would feel comfortable going back home pt states \"yes I still care about her\" when asked about current SI pt states \"I wouldn't do anything to let her get off that easy but I'm just joking with that\" pt states \"stopping drinking, smoking and drugs months ago\"  "

## 2023-06-10 NOTE — ED NOTES
"Pt is a 74 y o  male who was brought to the ED with   Chief Complaint   Patient presents with   • Psychiatric Evaluation     See note   Patient brought to the ED via EMS from his job at [employer] ([place], [place])  with complaints of possible not feeling well, Patient reports  that he went to work and once he go there his manager  told him that he was not on the schdeule to work  Patient states \" she said Meeta Abreu supposed to be at work then she had someone else asked me some questions\" Patient reports that sometime he does get depressed thinking about the his marriage that is now over (patient and his ex-wife still live together), CIS discussed with patient about the statement patient made to his coworkers that he wanted to give his thing away Patient states \"I didnt mean that or say that\" Patient reports that his schedule 4days a week 5-p-9p and that he sometimes shows up very early or on the worng day, patient reports that this happens often  It seems that patient has issues with his short term memory, Melanie Schuler is unable to recall things from the past few days, but can recall his time in the [employer]  Patient also reports that he is sad about his car that is not running well  Patient denies S/I,H/I,A/H,V/H  Intake Assessment completed, Safety risk Assessment completed  CIS discussed what happened today with patient  Patient continues to deny S/I,H/I,A/H,V/H and reports that he does feel safe at home  CIS called [employer] and spoke with Alma Kenny () to obtain collateral information, Alma Kenny informed me that patient does show up for very early or on days when he is not scheduled  Today she called [place] [employer] cause patient showed up today and he looked tired and sleepy  But she would not disclose any further details about what happened today that prompted the call  CIS asked patient if CIS could call patient ex-wife, CIS called patient ex-wife Andree Aragon [phone] left VM   " Dave Lemon  Crisis Intervention Specialist II

## 2023-06-10 NOTE — ED PROVIDER NOTES
History  Chief Complaint   Patient presents with   • Psychiatric Evaluation     See note     This is a 75-year-old male who presents via ambulance from work for evaluation of confusion  Patient works at [employer] and showed up for work today but staff there states that he was not supposed to be at work today  He is oriented to person at this time but not place and month or year  No focal neurologic deficits on examination no signs of trauma patient denies any suicidal or homicidal ideation  History provided by:  Patient and EMS personnel  Medical Problem  Location:  Generalized  Quality:  Confusion  Severity:  Unable to specify  Onset quality:  Unable to specify  Timing:  Intermittent  Chronicity:  Recurrent  Context:  Confusion sent by ambulance from work for further evaluation      Prior to Admission Medications   Prescriptions Last Dose Informant Patient Reported? Taking? Cyanocobalamin 1000 MCG SUBL   Yes No   Sig: every 24 hours   Levothyroxine Sodium 112 MCG CAPS   Yes No   Sig: Take 1 capsule by mouth every 24 hours   ferrous sulfate 325 (65 Fe) mg tablet   Yes No   Sig: Every 12 hours   fluticasone (FLONASE) 50 mcg/act nasal spray   No No   Si spray into each nostril daily   latanoprost (XALATAN) 0 005 % ophthalmic solution   Yes No   Sig: place 1 drop into both eyes at bedtime   levothyroxine 112 mcg tablet   Yes No   Sig: Take 112 mcg by mouth every morning Take on an empty stomach   losartan (COZAAR) 50 mg tablet   Yes No   Sig: Take 1 tablet by mouth every 24 hours   lovastatin (MEVACOR) 40 MG tablet   Yes No   Sig: Take 1 tablet by mouth every 24 hours      Facility-Administered Medications: None       Past Medical History:   Diagnosis Date   • Disease of thyroid gland    • Hyperlipidemia    • Hypertension        Past Surgical History:   Procedure Laterality Date   • HERNIA REPAIR     • REPLACEMENT TOTAL KNEE BILATERAL         History reviewed  No pertinent family history    I have reviewed and agree with the history as documented  E-Cigarette/Vaping     E-Cigarette/Vaping Substances     Social History     Tobacco Use   • Smoking status: Former     Types: Cigarettes     Quit date: 10/23/2009     Years since quittin 6   • Smokeless tobacco: Never   Substance Use Topics   • Alcohol use: Yes     Comment: socially   • Drug use: Never       Review of Systems   Psychiatric/Behavioral: Negative for suicidal ideas  All other systems reviewed and are negative  Physical Exam  Physical Exam  Vitals and nursing note reviewed  Constitutional:       General: He is not in acute distress  Appearance: He is not ill-appearing, toxic-appearing or diaphoretic  HENT:      Right Ear: Tympanic membrane, ear canal and external ear normal       Left Ear: Tympanic membrane, ear canal and external ear normal       Nose: Nose normal       Mouth/Throat:      Mouth: Mucous membranes are moist    Eyes:      General:         Right eye: No discharge  Left eye: No discharge  Extraocular Movements: Extraocular movements intact  Pupils: Pupils are equal, round, and reactive to light  Cardiovascular:      Rate and Rhythm: Normal rate and regular rhythm  Pulses: Normal pulses  Heart sounds: No murmur heard  No friction rub  No gallop  Pulmonary:      Effort: Pulmonary effort is normal  No respiratory distress  Breath sounds: No stridor  No wheezing, rhonchi or rales  Abdominal:      General: There is no distension  Palpations: Abdomen is soft  Tenderness: There is no abdominal tenderness  There is no guarding or rebound  Musculoskeletal:         General: No swelling, tenderness, deformity or signs of injury  Normal range of motion  Cervical back: Neck supple  No rigidity or tenderness  Right lower leg: No edema  Left lower leg: No edema  Skin:     General: Skin is warm and dry  Findings: No erythema or rash     Neurological: General: No focal deficit present  Mental Status: He is alert  He is disoriented  Cranial Nerves: No cranial nerve deficit  Sensory: No sensory deficit        Coordination: Coordination normal    Psychiatric:      Comments: Very forgetful denies any suicidal ideation         Vital Signs  ED Triage Vitals [06/10/23 1211]   Temperature Pulse Respirations Blood Pressure SpO2   97 9 °F (36 6 °C) 69 16 (!) 150/101 96 %      Temp src Heart Rate Source Patient Position - Orthostatic VS BP Location FiO2 (%)   -- Monitor Sitting Left arm --      Pain Score       No Pain           Vitals:    06/10/23 1330 06/10/23 1430 06/10/23 1530 06/10/23 1600   BP: 155/90 142/86 164/88 155/86   Pulse: 63 61 64 75   Patient Position - Orthostatic VS: Sitting Sitting Sitting Sitting         Visual Acuity  Visual Acuity    Flowsheet Row Most Recent Value   L Pupil Size (mm) 3   R Pupil Size (mm) 3          ED Medications  Medications   levothyroxine tablet 112 mcg (has no administration in time range)       Diagnostic Studies  Results Reviewed     Procedure Component Value Units Date/Time    HS Troponin I 2hr [[id number]]  (Normal) Collected: 06/10/23 1515    Lab Status: Final result Specimen: Blood from Arm, Left Updated: 06/10/23 1614     hs TnI 2hr 6 ng/L      Delta 2hr hsTnI 0 ng/L     TSH [[id number]]  (Abnormal) Collected: 06/10/23 1259    Lab Status: Final result Specimen: Blood from Arm, Left Updated: 06/10/23 1531     TSH 3RD GENERATON 135 020 uIU/mL     HS Troponin I 4hr [[id number]]     Lab Status: No result Specimen: Blood     Rapid drug screen, urine [[id number]]  (Normal) Collected: 06/10/23 1326    Lab Status: Final result Specimen: Urine, Clean Catch Updated: 06/10/23 1422     Amph/Meth UR Negative     Barbiturate Ur Negative     Benzodiazepine Urine Negative     Cocaine Urine Negative     Methadone Urine Negative     Opiate Urine Negative     PCP Ur Negative     THC Urine Negative     Oxycodone Urine Negative Narrative:      FOR MEDICAL PURPOSES ONLY  IF CONFIRMATION NEEDED PLEASE CONTACT THE LAB WITHIN 5 DAYS      Drug Screen Cutoff Levels:  AMPHETAMINE/METHAMPHETAMINES  1000 ng/mL  BARBITURATES     200 ng/mL  BENZODIAZEPINES     200 ng/mL  COCAINE      300 ng/mL  METHADONE      300 ng/mL  OPIATES      300 ng/mL  PHENCYCLIDINE     25 ng/mL  THC       50 ng/mL  OXYCODONE      100 ng/mL    Urine Microscopic [[id number]]  (Abnormal) Collected: 06/10/23 1326    Lab Status: Final result Specimen: Urine, Clean Catch Updated: 06/10/23 1416     RBC, UA None Seen /hpf      WBC, UA 2-4 /hpf      Epithelial Cells Occasional /hpf      Bacteria, UA Occasional /hpf      Hyaline Casts, UA 1-2 /lpf     UA w Reflex to Microscopic w Reflex to Culture [[id number]]  (Abnormal) Collected: 06/10/23 1326    Lab Status: Final result Specimen: Urine, Clean Catch Updated: 06/10/23 1409     Color, UA Dark Yellow     Clarity, UA Clear     Specific Gravity, UA >=1 030     pH, UA 6 0     Leukocytes, UA Large     Nitrite, UA Negative     Protein, UA 30 (1+) mg/dl      Glucose, UA Negative mg/dl      Ketones, UA Negative mg/dl      Urobilinogen, UA 8 0 mg/dl      Bilirubin, UA Small     Occult Blood, UA Negative    Salicylate level [[id number]]  (Normal) Collected: 06/10/23 1259    Lab Status: Final result Specimen: Blood from Arm, Left Updated: 82/65/26 1467     Salicylate Lvl <5 mg/dL     Comprehensive metabolic panel [[id number]]  (Abnormal) Collected: 06/10/23 1259    Lab Status: Final result Specimen: Blood from Arm, Left Updated: 06/10/23 1346     Sodium 139 mmol/L      Potassium 3 6 mmol/L      Chloride 101 mmol/L      CO2 30 mmol/L      ANION GAP 8 mmol/L      BUN 19 mg/dL      Creatinine 1 59 mg/dL      Glucose 118 mg/dL      Calcium 9 7 mg/dL      AST 54 U/L      ALT 34 U/L      Alkaline Phosphatase 54 U/L      Total Protein 7 6 g/dL      Albumin 4 9 g/dL      Total Bilirubin 2 66 mg/dL      eGFR 42 ml/min/1 73sq m     Narrative:      [facility] Kid[facility] Disease Foundation guidelines for Chronic Kidney Disease (CKD):   •  Stage 1 with normal or high GFR (GFR > 90 mL/min/1 73 square meters)  •  Stage 2 Mild CKD (GFR = 60-89 mL/min/1 73 square meters)  •  Stage 3A Moderate CKD (GFR = 45-59 mL/min/1 73 square meters)  •  Stage 3B Moderate CKD (GFR = 30-44 mL/min/1 73 square meters)  •  Stage 4 Severe CKD (GFR = 15-29 mL/min/1 73 square meters)  •  Stage 5 End Stage CKD (GFR <15 mL/min/1 73 square meters)  Note: GFR calculation is accurate only with a steady state creatinine    Acetaminophen level-If concentration is detectable, please discuss with medical  on call  [[id number]]  (Abnormal) Collected: 06/10/23 1259    Lab Status: Final result Specimen: Blood from Arm, Left Updated: 06/10/23 1346     Acetaminophen Level <10 ug/mL     HS Troponin 0hr (reflex protocol) [[id number]]  (Normal) Collected: 06/10/23 1259    Lab Status: Final result Specimen: Blood from Arm, Left Updated: 06/10/23 1345     hs TnI 0hr 6 ng/L     FLU/RSV/COVID - if FLU/RSV clinically relevant [[id number]]  (Normal) Collected: 06/10/23 1259    Lab Status: Final result Specimen: Nares from Nose Updated: 06/10/23 1344     SARS-CoV-2 Negative     INFLUENZA A PCR Negative     INFLUENZA B PCR Negative     RSV PCR Negative    Narrative:      FOR PEDIATRIC PATIENTS - copy/paste COVID Guidelines URL to browser: [email or web address]    SARS-CoV-2 assay is a Nucleic Acid Amplification assay intended for the  qualitative detection of nucleic acid from SARS-CoV-2 in nasopharyngeal  swabs  Results are for the presumptive identification of SARS-CoV-2 RNA  Positive results are indicative of infection with SARS-CoV-2, the virus  causing COVID-19, but do not rule out bacterial infection or co-infection  with other viruses   Laboratories within the [place] and its  territories are required to report all positive results to the appropriate  public health authorities  Negative results do not preclude SARS-CoV-2  infection and should not be used as the sole basis for treatment or other  patient management decisions  Negative results must be combined with  clinical observations, patient history, and epidemiological information  This test has not been FDA cleared or approved  This test has been authorized by FDA under an Emergency Use Authorization  (EUA)  This test is only authorized for the duration of time the  declaration that circumstances exist justifying the authorization of the  emergency use of an in vitro diagnostic tests for detection of SARS-CoV-2  virus and/or diagnosis of COVID-19 infection under section 564(b)(1) of  the Act, 21 U  S C  973WMW-6(S)(4), unless the authorization is terminated  or revoked sooner  The test has been validated but independent review by FDA  and CLIA is pending  Test performed using [employer] Gene[employer]pert: This RT-PCR assay targets N2,  a region unique to SARS-CoV-2  A conserved region in the E-gene was chosen  for pan-Sarbecovirus detection which includes SARS-CoV-2  According to CMS-2020-01-R, this platform meets the definition of high-throughput technology      Ethanol [[id number]]  (Normal) Collected: 06/10/23 1259    Lab Status: Final result Specimen: Blood from Arm, Left Updated: 06/10/23 1337     Ethanol Lvl <10 mg/dL     Protime-INR [[id number]]  (Normal) Collected: 06/10/23 1259    Lab Status: Final result Specimen: Blood from Arm, Left Updated: 06/10/23 1319     Protime 13 9 seconds      INR 1 00    APTT [[id number]]  (Normal) Collected: 06/10/23 1259    Lab Status: Final result Specimen: Blood from Arm, Left Updated: 06/10/23 1319     PTT 31 seconds     CBC and differential [[id number]] Collected: 06/10/23 1259    Lab Status: Final result Specimen: Blood from Arm, Left Updated: 06/10/23 1307     WBC 4 93 Thousand/uL      RBC 4 84 Million/uL      Hemoglobin 15 4 g/dL      Hematocrit 47 0 % MCV 97 fL      MCH 31 8 pg      MCHC 32 8 g/dL      RDW 14 4 %      MPV 10 6 fL      Platelets 788 Thousands/uL      nRBC 0 /100 WBCs      Neutrophils Relative 66 %      Immat GRANS % 0 %      Lymphocytes Relative 23 %      Monocytes Relative 9 %      Eosinophils Relative 1 %      Basophils Relative 1 %      Neutrophils Absolute 3 29 Thousands/µL      Immature Grans Absolute 0 01 Thousand/uL      Lymphocytes Absolute 1 14 Thousands/µL      Monocytes Absolute 0 42 Thousand/µL      Eosinophils Absolute 0 04 Thousand/µL      Basophils Absolute 0 03 Thousands/µL                  CT head without contrast   Final Result by Rebecca Morgan MD (06/10 1416)      No acute intracranial abnormality  Workstation performed: [id number]         XR chest 1 view portable   Final Result by Ld Nagy MD (06/10 1538)      No acute cardiopulmonary disease  Workstation performed: [id number]                    Procedures  ECG 12 Lead Documentation Only    Date/Time: 6/10/2023 2:11 PM    Performed by: Juan Pablo Rajan DO  Authorized by: Juan Pablo Rajan DO    ECG reviewed by me, the ED Provider: yes    Patient location:  ED  Rate:     ECG rate:  60  Rhythm:     Rhythm: sinus rhythm    Conduction:     Conduction: abnormal      Abnormal conduction: 1st degree    T waves:     T waves: normal               ED Course  ED Course as of 06/10/23 1640   Sat Rafael 10, 2023   1617 Discussed findings with patient he does not want to stay in the hospital he is oriented to person and place but not time at this point but he is able to understand my recommendations for admission and understand the consequences of poor health/death if he leaves however he still wishes to sign out [address]  Crisis and nursing staff were present during this conversation and witnessed patient's understanding    I did instruct him that he needs to take his medication without fail he states that he has been noncompliant  HEART Risk Score    Flowsheet Row Most Recent Value   Heart Score Risk Calculator    History 0 Filed at: 06/10/2023 1625   ECG 1 Filed at: 06/10/2023 1625   Age 2 Filed at: 06/10/2023 1625   Risk Factors 1 Filed at: 06/10/2023 1625   Troponin 0 Filed at: 06/10/2023 1625   HEART Score 4 Filed at: 06/10/2023 1625                        SBIRT 22yo+    Flowsheet Row Most Recent Value   Initial Alcohol Screen: US AUDIT-C     1  How often do you have a drink containing alcohol? 0 Filed at: 06/10/2023 1230   2  How many drinks containing alcohol do you have on a typical day you are drinking? 0 Filed at: 06/10/2023 1230   3a  Male UNDER 65: How often do you have five or more drinks on one occasion? 0 Filed at: 06/10/2023 1230   3b  FEMALE Any Age, or MALE 65+: How often do you have 4 or more drinks on one occassion? 0 Filed at: 06/10/2023 1230   Audit-C Score 0 Filed at: 06/10/2023 1230   MECHELLE: How many times in the past year have you    Used an illegal drug or used a prescription medication for non-medical reasons? Never Filed at: 06/10/2023 1230                    Medical Decision Making  Depression and forgetfulness medical clearance in process we will check labs and CAT scan urinalysis and consult crisis    Amount and/or Complexity of Data Reviewed  Labs: ordered  Radiology: ordered            Disposition  Final diagnoses:   Memory loss   Hypothyroidism   Noncompliance with medication regimen   Left against medical advice     Time reflects when diagnosis was documented in both MDM as applicable and the Disposition within this note     Time User Action Codes Description Comment    6/10/2023  4:24 PM Olga Melena Add [J96 90] Respiratory failure ([facility] [address] )     6/10/2023  4:27 PM Olga Melena Remove [J96 90] Respiratory failure ([facility] [address] )     6/10/2023  4:31 PM Colan Abo [R41 3] Memory loss     6/10/2023  4:32 PM Colan Abo [E03 9] Hypothyroidism     6/10/2023  4:32 PM Olga Melena Add [Z91 148] Noncompliance with medication regimen     6/10/2023  4:32 PM Ricardotrudy Merrittt [Z53 29] Left against medical advice       ED Disposition     ED Disposition   AMA    Condition   --    Date/Time   Sat Rafael 10, 2023  4:31 PM    Comment   Date: 6/10/2023  Patient: Drew Ríos  Admitted: 6/10/2023 12:05 PM  Attending Provider: Galdino Carlton DO    Drew Ríos or his authorized caregiver has made the decision for the patient to leave the emergency department against the adv ice of his attending physician  He or his authorized caregiver has been informed and understands the inherent risks, including death, *and poor health outcomeand poor health outcome**   He or his authorized caregiver has decided to accept the respon sibility for this decision  Drew Ríos and all necessary parties have been advised that he may return for further evaluation or treatment  His condition at time of discharge was able to understand my recommendations repeat them back to me and u nderstand consequences  Drew Ríos had current vital signs as follows:  /86 (BP Location: Right arm)   Pulse 75   Temp 97 9 °F (36 6 °C)   Resp 16            MD Documentation    Flowsheet Row Most Recent Value   Sending MD Dr Leonor Oakley up With Specialties Details Why Contact Info Additional Information    Jose Payne,  Family Medicine In 3 days  [facility] [address]  [address] Emergency Department Emergency Medicine  As needed, If symptoms worsen [address] [place],[address] [address]  [address] [facility] Emergency Department, [address] [place] [address] [address]          Patient's Medications   Discharge Prescriptions    No medications on file       No discharge procedures on file      PDMP Review     None          ED Provider  Electronically Signed by           Jorge Padilla, DO  06/10/23 1111 [address] [place] [address], DO  06/10/23 1640

## 2023-06-11 ENCOUNTER — HOSPITAL ENCOUNTER (INPATIENT)
Facility: HOSPITAL | Age: 73
LOS: 1 days | Discharge: HOME | DRG: 643 | End: 2023-06-13
Attending: EMERGENCY MEDICINE | Admitting: INTERNAL MEDICINE
Payer: MEDICARE

## 2023-06-11 DIAGNOSIS — N18.9 ACUTE KIDNEY INJURY SUPERIMPOSED ON CHRONIC KIDNEY DISEASE (HCC): ICD-10-CM

## 2023-06-11 DIAGNOSIS — E78.5 HLD (HYPERLIPIDEMIA): ICD-10-CM

## 2023-06-11 DIAGNOSIS — G47.33 OSA ON CPAP: ICD-10-CM

## 2023-06-11 DIAGNOSIS — R41.3 MEMORY LOSS: ICD-10-CM

## 2023-06-11 DIAGNOSIS — R53.1 GENERALIZED WEAKNESS: ICD-10-CM

## 2023-06-11 DIAGNOSIS — E03.9 HYPOTHYROIDISM: ICD-10-CM

## 2023-06-11 DIAGNOSIS — Z99.89 OSA ON CPAP: ICD-10-CM

## 2023-06-11 DIAGNOSIS — R41.0 CONFUSION: Primary | ICD-10-CM

## 2023-06-11 DIAGNOSIS — R53.1 WEAKNESS: ICD-10-CM

## 2023-06-11 DIAGNOSIS — D51.9 B12 DEFICIENCY ANEMIA: ICD-10-CM

## 2023-06-11 DIAGNOSIS — N17.9 ACUTE KIDNEY INJURY SUPERIMPOSED ON CHRONIC KIDNEY DISEASE (HCC): ICD-10-CM

## 2023-06-11 PROBLEM — I10 BENIGN ESSENTIAL HTN: Status: ACTIVE | Noted: 2023-06-11

## 2023-06-11 PROBLEM — H40.9 GLAUCOMA: Status: ACTIVE | Noted: 2023-06-11

## 2023-06-11 PROBLEM — R79.89 ELEVATED SERUM CREATININE: Status: ACTIVE | Noted: 2023-06-11

## 2023-06-11 PROBLEM — H35.30 MACULAR DEGENERATION: Status: ACTIVE | Noted: 2023-06-11

## 2023-06-11 LAB
ALBUMIN SERPL BCP-MCNC: 4.6 G/DL (ref 3.5–5)
ALP SERPL-CCNC: 48 U/L (ref 34–104)
ALT SERPL W P-5'-P-CCNC: 31 U/L (ref 7–52)
ANION GAP SERPL CALCULATED.3IONS-SCNC: 8 MMOL/L (ref 4–13)
AST SERPL W P-5'-P-CCNC: 52 U/L (ref 13–39)
BASOPHILS # BLD AUTO: 0.03 THOUSANDS/ÂΜL (ref 0–0.1)
BASOPHILS NFR BLD AUTO: 1 % (ref 0–1)
BILIRUB SERPL-MCNC: 2.66 MG/DL (ref 0.2–1)
BUN SERPL-MCNC: 18 MG/DL (ref 5–25)
CALCIUM SERPL-MCNC: 9.4 MG/DL (ref 8.4–10.2)
CHLORIDE SERPL-SCNC: 101 MMOL/L (ref 96–108)
CO2 SERPL-SCNC: 29 MMOL/L (ref 21–32)
CREAT SERPL-MCNC: 1.7 MG/DL (ref 0.6–1.3)
EOSINOPHIL # BLD AUTO: 0.05 THOUSAND/ÂΜL (ref 0–0.61)
EOSINOPHIL NFR BLD AUTO: 1 % (ref 0–6)
ERYTHROCYTE [DISTWIDTH] IN BLOOD BY AUTOMATED COUNT: 14.5 % (ref 11.6–15.1)
GFR SERPL CREATININE-BSD FRML MDRD: 38 ML/MIN/1.73SQ M
GLUCOSE SERPL-MCNC: 135 MG/DL (ref 65–140)
HCT VFR BLD AUTO: 45.1 % (ref 36.5–49.3)
HGB BLD-MCNC: 15 G/DL (ref 12–17)
IMM GRANULOCYTES # BLD AUTO: 0.01 THOUSAND/UL (ref 0–0.2)
IMM GRANULOCYTES NFR BLD AUTO: 0 % (ref 0–2)
LYMPHOCYTES # BLD AUTO: 1.33 THOUSANDS/ÂΜL (ref 0.6–4.47)
LYMPHOCYTES NFR BLD AUTO: 29 % (ref 14–44)
MCH RBC QN AUTO: 32.5 PG (ref 26.8–34.3)
MCHC RBC AUTO-ENTMCNC: 33.3 G/DL (ref 31.4–37.4)
MCV RBC AUTO: 98 FL (ref 82–98)
MONOCYTES # BLD AUTO: 0.37 THOUSAND/ÂΜL (ref 0.17–1.22)
MONOCYTES NFR BLD AUTO: 8 % (ref 4–12)
NEUTROPHILS # BLD AUTO: 2.8 THOUSANDS/ÂΜL (ref 1.85–7.62)
NEUTS SEG NFR BLD AUTO: 61 % (ref 43–75)
NRBC BLD AUTO-RTO: 0 /100 WBCS
PLATELET # BLD AUTO: 190 THOUSANDS/UL (ref 149–390)
PMV BLD AUTO: 10.8 FL (ref 8.9–12.7)
POTASSIUM SERPL-SCNC: 3.6 MMOL/L (ref 3.5–5.3)
PROT SERPL-MCNC: 7.2 G/DL (ref 6.4–8.4)
RBC # BLD AUTO: 4.62 MILLION/UL (ref 3.88–5.62)
SODIUM SERPL-SCNC: 138 MMOL/L (ref 135–147)
TSH SERPL DL<=0.05 MIU/L-ACNC: 138.45 UIU/ML (ref 0.45–4.5)
WBC # BLD AUTO: 4.59 THOUSAND/UL (ref 4.31–10.16)

## 2023-06-11 PROCEDURE — 80053 COMPREHEN METABOLIC PANEL: CPT | Performed by: EMERGENCY MEDICINE

## 2023-06-11 PROCEDURE — 99284 EMERGENCY DEPT VISIT MOD MDM: CPT

## 2023-06-11 PROCEDURE — 84443 ASSAY THYROID STIM HORMONE: CPT | Performed by: EMERGENCY MEDICINE

## 2023-06-11 PROCEDURE — 84439 ASSAY OF FREE THYROXINE: CPT | Performed by: PHYSICIAN ASSISTANT

## 2023-06-11 PROCEDURE — 82746 ASSAY OF FOLIC ACID SERUM: CPT | Performed by: PHYSICIAN ASSISTANT

## 2023-06-11 PROCEDURE — 36415 COLL VENOUS BLD VENIPUNCTURE: CPT | Performed by: EMERGENCY MEDICINE

## 2023-06-11 PROCEDURE — 99223 1ST HOSP IP/OBS HIGH 75: CPT | Performed by: HOSPITALIST

## 2023-06-11 PROCEDURE — 85025 COMPLETE CBC W/AUTO DIFF WBC: CPT | Performed by: EMERGENCY MEDICINE

## 2023-06-11 PROCEDURE — 93005 ELECTROCARDIOGRAM TRACING: CPT

## 2023-06-11 RX ORDER — POLYETHYLENE GLYCOL 3350 17 G/17G
17 POWDER, FOR SOLUTION ORAL DAILY PRN
Status: DISCONTINUED | OUTPATIENT
Start: 2023-06-11 | End: 2023-06-13 | Stop reason: HOSPADM

## 2023-06-11 RX ORDER — SODIUM CHLORIDE, SODIUM GLUCONATE, SODIUM ACETATE, POTASSIUM CHLORIDE, MAGNESIUM CHLORIDE, SODIUM PHOSPHATE, DIBASIC, AND POTASSIUM PHOSPHATE .53; .5; .37; .037; .03; .012; .00082 G/100ML; G/100ML; G/100ML; G/100ML; G/100ML; G/100ML; G/100ML
100 INJECTION, SOLUTION INTRAVENOUS CONTINUOUS
Status: DISPENSED | OUTPATIENT
Start: 2023-06-11 | End: 2023-06-11

## 2023-06-11 RX ORDER — HEPARIN SODIUM 5000 [USP'U]/ML
5000 INJECTION, SOLUTION INTRAVENOUS; SUBCUTANEOUS EVERY 8 HOURS SCHEDULED
Status: DISCONTINUED | OUTPATIENT
Start: 2023-06-11 | End: 2023-06-13 | Stop reason: HOSPADM

## 2023-06-11 RX ORDER — ACETAMINOPHEN 325 MG/1
650 TABLET ORAL EVERY 6 HOURS PRN
Status: DISCONTINUED | OUTPATIENT
Start: 2023-06-11 | End: 2023-06-13 | Stop reason: HOSPADM

## 2023-06-11 RX ORDER — LEVOTHYROXINE SODIUM 112 UG/1
112 TABLET ORAL
Status: DISCONTINUED | OUTPATIENT
Start: 2023-06-12 | End: 2023-06-11

## 2023-06-11 RX ORDER — SODIUM CHLORIDE, SODIUM GLUCONATE, SODIUM ACETATE, POTASSIUM CHLORIDE, MAGNESIUM CHLORIDE, SODIUM PHOSPHATE, DIBASIC, AND POTASSIUM PHOSPHATE .53; .5; .37; .037; .03; .012; .00082 G/100ML; G/100ML; G/100ML; G/100ML; G/100ML; G/100ML; G/100ML
100 INJECTION, SOLUTION INTRAVENOUS CONTINUOUS
Status: DISCONTINUED | OUTPATIENT
Start: 2023-06-11 | End: 2023-06-12

## 2023-06-11 RX ORDER — FERROUS SULFATE 325(65) MG
325 TABLET ORAL
Status: DISCONTINUED | OUTPATIENT
Start: 2023-06-12 | End: 2023-06-13 | Stop reason: HOSPADM

## 2023-06-11 RX ORDER — LEVOTHYROXINE SODIUM 112 UG/1
112 TABLET ORAL
Status: DISCONTINUED | OUTPATIENT
Start: 2023-06-11 | End: 2023-06-13 | Stop reason: HOSPADM

## 2023-06-11 RX ORDER — LANOLIN ALCOHOL/MO/W.PET/CERES
6 CREAM (GRAM) TOPICAL
Status: DISCONTINUED | OUTPATIENT
Start: 2023-06-11 | End: 2023-06-13 | Stop reason: HOSPADM

## 2023-06-11 RX ORDER — ONDANSETRON 2 MG/ML
4 INJECTION INTRAMUSCULAR; INTRAVENOUS EVERY 6 HOURS PRN
Status: DISCONTINUED | OUTPATIENT
Start: 2023-06-11 | End: 2023-06-13 | Stop reason: HOSPADM

## 2023-06-11 RX ORDER — LATANOPROST 50 UG/ML
1 SOLUTION/ DROPS OPHTHALMIC
Status: DISCONTINUED | OUTPATIENT
Start: 2023-06-11 | End: 2023-06-13 | Stop reason: HOSPADM

## 2023-06-11 RX ORDER — PRAVASTATIN SODIUM 40 MG
40 TABLET ORAL
Status: DISCONTINUED | OUTPATIENT
Start: 2023-06-11 | End: 2023-06-13 | Stop reason: HOSPADM

## 2023-06-11 RX ORDER — MAGNESIUM HYDROXIDE/ALUMINUM HYDROXICE/SIMETHICONE 120; 1200; 1200 MG/30ML; MG/30ML; MG/30ML
30 SUSPENSION ORAL EVERY 6 HOURS PRN
Status: DISCONTINUED | OUTPATIENT
Start: 2023-06-11 | End: 2023-06-13 | Stop reason: HOSPADM

## 2023-06-11 RX ADMIN — PRAVASTATIN SODIUM 40 MG: 40 TABLET ORAL at 17:02

## 2023-06-11 RX ADMIN — LATANOPROST 1 DROP: 50 SOLUTION OPHTHALMIC at 22:48

## 2023-06-11 RX ADMIN — LEVOTHYROXINE SODIUM 112 MCG: 112 TABLET ORAL at 15:27

## 2023-06-11 RX ADMIN — SODIUM CHLORIDE, SODIUM GLUCONATE, SODIUM ACETATE, POTASSIUM CHLORIDE, MAGNESIUM CHLORIDE, SODIUM PHOSPHATE, DIBASIC, AND POTASSIUM PHOSPHATE 100 ML/HR: .53; .5; .37; .037; .03; .012; .00082 INJECTION, SOLUTION INTRAVENOUS at 22:51

## 2023-06-11 RX ADMIN — SODIUM CHLORIDE, SODIUM GLUCONATE, SODIUM ACETATE, POTASSIUM CHLORIDE, MAGNESIUM CHLORIDE, SODIUM PHOSPHATE, DIBASIC, AND POTASSIUM PHOSPHATE 100 ML/HR: .53; .5; .37; .037; .03; .012; .00082 INJECTION, SOLUTION INTRAVENOUS at 14:35

## 2023-06-11 RX ADMIN — HEPARIN SODIUM 5000 UNITS: 5000 INJECTION INTRAVENOUS; SUBCUTANEOUS at 22:47

## 2023-06-11 NOTE — ASSESSMENT & PLAN NOTE
· Per recent PCP note  · Discussed with daughter in law that this problem has been ongoing and progressive for months and he is unable to live at home safely anymore    · Psychiatry consult  · Pt OT, OT cog eval and CM consult because family requesting assistance with placement

## 2023-06-11 NOTE — H&P
[place]  H&P  Name: Maude Montoya 74 y o  male I MRN: [id number]  Unit/Bed#: -01 I Date of Admission: 6/11/2023   Date of Service: 6/11/2023 I Hospital Day: 0      Assessment/Plan   * Memory loss  Assessment & Plan  · Per recent PCP note  · Discussed with daughter in law that this problem has been ongoing and progressive for months and he is unable to live at home safely anymore  · Psychiatry consult  · Pt OT, OT cog eval and CM consult because family requesting assistance with placement     Glaucoma  Assessment & Plan  · Continue eye drops    Macular degeneration  Assessment & Plan  · Fu with eye dr as OP    H/o B12 deficiency anemia  Assessment & Plan  · Not anemic  · Continue Vit b12 supplementation    Elevated serum creatinine  Assessment & Plan  Lab Results   Component Value Date    CREATININE 1 70 (H) 06/11/2023    CREATININE 1 59 (H) 06/10/2023    CREATININE 1 14 10/23/2020    EGFR 38 06/11/2023    EGFR 42 06/10/2023    EGFR 64 10/23/2020     · Unknown recent baseline creat  · Trend tomorrow BMP  · Hold home losartan for now    Generalized weakness  Assessment & Plan  · From hypothyroidism? · No SIRS  · Non-focal  · PT OT evals   · PCP has been concerned about patient safety at home  TORI on CPAP  Assessment & Plan  · CPAP qhs    HLD (hyperlipidemia)  Assessment & Plan  · Continue formulary statin substitute    Benign essential HTN  Assessment & Plan  · BP wnl  · Hold losartan for now    Hypothyroidism  Assessment & Plan  ·   · Pending T4  · Resume home levothyroxine 112 mcg qam  · Doubt myxedema coma at this time, trend BMP, temps, mentation  VTE Pharmacologic Prophylaxis: VTE Score: 3 Moderate Risk (Score 3-4) - Pharmacological DVT Prophylaxis Ordered: heparin  Code Status: Level 1 - Full Code   Discussion with family: Updated  (daughter in law) at bedside      Anticipated Length of Stay: Patient will be admitted on an observation basis with an anticipated length of stay of less than 2 midnights secondary to pt ot evals, psych eval, placement  Total Time Spent on Date of Encounter in care of patient: 55 minutes This time was spent on one or more of the following: performing physical exam; counseling and coordination of care; obtaining or reviewing history; documenting in the medical record; reviewing/ordering tests, medications or procedures; communicating with other healthcare professionals and discussing with patient's family/caregivers  Chief Complaint: memory issues x months    History of Present Illness:  Yvonne Chisholm is a 74 y o  male with a PMH of glaucoma, macular degeneration, HTN, HLD, CKD, B12 deficiency, hypothyroidism, TORI, vertigo who presents with memory issues x months  Patient has been having memory issues for months, progressively worsening and now interfering with work  He works at [employer] and they had him drive himself to the ED 6/10/2023 for confusion at work  He signed himself out of the ED via [employer] and drove himself home  He doesn't remember what he did after that  Today he reports he was walking outside and got tired and went to the [facility] house and his son drove him to the ED for weakness  Denies weakness at this time  Daughter in law states she has not heard from the son that there was hemiparesis, paresthesia, facial droop or dysarthria earlier in the day  It is typical at this point that the patient has memory loss that he cannot even remember earlier in the day or the events of the day before  Daughter in law doesn't think that the patient was evaluated for this problem before in the past  Daughter in law thinks that the patient's mother had h/o Alzheimer's dementia  Daughter in law reports the patient has no h/o MI, CVA or CA  Patient ambulates wo assistance  Former smoker, occasionally drinks alcohol however last drink was about 1 month ago per DIL  Denies drug use   Lives in the same house at his ex-wife  They are formally   They don't talk  Ex-wife does not know about he patient's compliance with his home Rx meds  When I ask if the pt takes Rx meds at home he says no  Patient has 1 son  He has no formal POA  Son and DIL have been researching assisted living facilities  Review of Systems:  Review of Systems   Constitutional: Negative  HENT: Negative  Eyes: Negative  Respiratory: Negative  Cardiovascular: Negative  Gastrointestinal: Negative  Endocrine: Negative  Genitourinary: Negative  Musculoskeletal: Negative  Skin: Negative  Allergic/Immunologic: Negative  Neurological: Negative for dizziness and weakness  Hematological: Negative  Psychiatric/Behavioral: Positive for confusion  Negative for behavioral problems  Past Medical and Surgical History:   Past Medical History:   Diagnosis Date   • Disease of thyroid gland    • Hyperlipidemia    • Hypertension        Past Surgical History:   Procedure Laterality Date   • HERNIA REPAIR     • REPLACEMENT TOTAL KNEE BILATERAL         Meds/Allergies:  Prior to Admission medications    Medication Sig Start Date End Date Taking?  Authorizing Provider   Cyanocobalamin 1000 MCG SUBL every 24 hours 7/30/20   Historical Provider, MD   ferrous sulfate 325 (65 Fe) mg tablet Every 12 hours    Historical Provider, MD   fluticasone (FLONASE) 50 mcg/act nasal spray 1 spray into each nostril daily 3/20/22   Destiney Marcus DO   latanoprost (XALATAN) 0 005 % ophthalmic solution place 1 drop into both eyes at bedtime 2/10/21   Historical Provider, MD   levothyroxine 112 mcg tablet Take 112 mcg by mouth every morning Take on an empty stomach 3/18/21   Historical Provider, MD   losartan (COZAAR) 50 mg tablet Take 1 tablet by mouth every 24 hours    Historical Provider, MD   lovastatin (MEVACOR) 40 MG tablet Take 1 tablet by mouth every 24 hours    Historical Provider, MD   Levothyroxine Sodium 112 MCG CAPS Take 1 "capsule by mouth every 24 hours 20  Historical Provider, MD     I have reviewed home medications using recent Epic encounter  Allergies: No Known Allergies    Social History:  Marital Status:    Occupation: [employer] worker  Patient Pre-hospital Living Situation: Home w/ ex-wife  Patient Pre-hospital Level of Mobility: walks wo assistance  Patient Pre-hospital Diet Restrictions: none  Substance Use History:   Social History     Substance and Sexual Activity   Alcohol Use Not Currently    Comment: no drinks in at least 6 months     Social History     Tobacco Use   Smoking Status Former   • Types: Cigarettes   • Quit date: 10/23/2009   • Years since quittin 6   Smokeless Tobacco Never     Social History     Substance and Sexual Activity   Drug Use Never       Family History:  Family History   Family history unknown: Yes       Physical Exam:     Vitals:   Blood Pressure: 131/94 (23 1301)  Pulse: 63 (23 1301)  Temperature: 97 7 °F (36 5 °C) (23 1301)  Temp Source: Oral (23 1045)  Respirations: 20 (23 1045)  Height: 5' 9\" (175 3 cm) (23 1341)  Weight - Scale: 87 9 kg (193 lb 12 8 oz) (23 1341)  SpO2: 99 % (23 1301)    Physical Exam  Vitals and nursing note reviewed  Constitutional:       Comments: awake   HENT:      Head: Normocephalic and atraumatic  Nose: Nose normal       Mouth/Throat:      Mouth: Mucous membranes are moist    Eyes:      Extraocular Movements: Extraocular movements intact  Conjunctiva/sclera: Conjunctivae normal       Pupils: Pupils are equal, round, and reactive to light  Cardiovascular:      Rate and Rhythm: Normal rate and regular rhythm  Pulses: Normal pulses  Heart sounds: Normal heart sounds  Pulmonary:      Effort: Pulmonary effort is normal    Abdominal:      Palpations: Abdomen is soft  Musculoskeletal:         General: No swelling or tenderness  Normal range of motion        " Cervical back: Neck supple  Skin:     General: Skin is warm and dry  Neurological:      General: No focal deficit present  Mental Status: Mental status is at baseline  He is disoriented  Cranial Nerves: No cranial nerve deficit  Sensory: No sensory deficit  Motor: No weakness  Psychiatric:      Comments: Illogical thought processing, no hallucinations          Additional Data:     Lab Results:  Results from last 7 days   Lab Units 06/11/23  1054   EOS PCT % 1   HEMATOCRIT % 45 1   HEMOGLOBIN g/dL 15 0   LYMPHS PCT % 29   MONOS PCT % 8   NEUTROS PCT % 61   PLATELETS Thousands/uL 190   WBC Thousand/uL 4 59     Results from last 7 days   Lab Units 06/11/23  1054   ANION GAP mmol/L 8   ALBUMIN g/dL 4 6   ALK PHOS U/L 48   ALT U/L 31   AST U/L 52*   BUN mg/dL 18   CALCIUM mg/dL 9 4   CHLORIDE mmol/L 101   CO2 mmol/L 29   CREATININE mg/dL 1 70*   GLUCOSE RANDOM mg/dL 135   POTASSIUM mmol/L 3 6   SODIUM mmol/L 138   TOTAL BILIRUBIN mg/dL 2 66*     Results from last 7 days   Lab Units 06/10/23  1259   INR  1 00                   Lines/Drains:  Invasive Devices     Peripheral Intravenous Line  Duration           Peripheral IV 06/11/23 Right Antecubital <1 day                    Imaging: CTH from 6/10/2023  IMPRESSION:     No acute intracranial abnormality  EKG and Other Studies Reviewed on Admission:   · EKG: NSR  HR 71     ** Please Note: This note has been constructed using a voice recognition system   **

## 2023-06-11 NOTE — ED NOTES
Patient declined mental health resources, treatment, and discharge/crisis planning  Patient requested discharge from ED AMA

## 2023-06-11 NOTE — ASSESSMENT & PLAN NOTE
Lab Results   Component Value Date    CREATININE 1 70 (H) 06/11/2023    CREATININE 1 59 (H) 06/10/2023    CREATININE 1 14 10/23/2020    EGFR 38 06/11/2023    EGFR 42 06/10/2023    EGFR 64 10/23/2020     · Unknown recent baseline creat  · Trend tomorrow BMP  · Hold home losartan for now

## 2023-06-11 NOTE — ASSESSMENT & PLAN NOTE
·   · Pending T4  · Resume home levothyroxine 112 mcg qam  · Doubt myxedema coma at this time, trend BMP, temps, mentation

## 2023-06-11 NOTE — ASSESSMENT & PLAN NOTE
· Comes with confusion at work  · Has been struggling with memory issues per PCP note  · Psychiatry consult  · Possibly hypothyroidism contributing, see below  · UA ordered  · CTH no acute abnormality

## 2023-06-11 NOTE — PLAN OF CARE
Problem: MOBILITY - ADULT  Goal: Maintain or return to baseline ADL function  Description: INTERVENTIONS:  -  Assess patient's ability to carry out ADLs; assess patient's baseline for ADL function and identify physical deficits which impact ability to perform ADLs (bathing, care of mouth/teeth, toileting, grooming, dressing, etc )  - Assess/evaluate cause of self-care deficits   - Assess range of motion  - Assess patient's mobility; develop plan if impaired  - Assess patient's need for assistive devices and provide as appropriate  - Encourage maximum independence but intervene and supervise when necessary  - Involve family in performance of ADLs  - Assess for home care needs following discharge   - Consider OT consult to assist with ADL evaluation and planning for discharge  - Provide patient education as appropriate  Outcome: Progressing  Goal: Maintains/Returns to pre admission functional level  Description: INTERVENTIONS:  - Perform BMAT or MOVE assessment daily    - Set and communicate daily mobility goal to care team and patient/family/caregiver  - Collaborate with rehabilitation services on mobility goals if consulted  - Perform Range of Motion 3 times a day  - Reposition patient every 2 hours    - Dangle patient 3 times a day  - Stand patient 3 times a day  - Ambulate patient 3 times a day  - Out of bed to chair 3 times a day   - Out of bed for meals 3 times a day  - Out of bed for toileting  - Record patient progress and toleration of activity level   Outcome: Progressing     Problem: PAIN - ADULT  Goal: Verbalizes/displays adequate comfort level or baseline comfort level  Description: Interventions:  - Encourage patient to monitor pain and request assistance  - Assess pain using appropriate pain scale  - Administer analgesics based on type and severity of pain and evaluate response  - Implement non-pharmacological measures as appropriate and evaluate response  - Consider cultural and social influences on pain and pain management  - Notify physician/advanced practitioner if interventions unsuccessful or patient reports new pain  Outcome: Progressing     Problem: INFECTION - ADULT  Goal: Absence or prevention of progression during hospitalization  Description: INTERVENTIONS:  - Assess and monitor for signs and symptoms of infection  - Monitor lab/diagnostic results  - Monitor all insertion sites, i e  indwelling lines, tubes, and drains  - Administer medications as ordered  - Instruct and encourage patient and family to use good hand hygiene technique  - Identify and instruct in appropriate isolation precautions for identified infection/condition  Outcome: Progressing     Problem: SAFETY ADULT  Goal: Maintain or return to baseline ADL function  Description: INTERVENTIONS:  -  Assess patient's ability to carry out ADLs; assess patient's baseline for ADL function and identify physical deficits which impact ability to perform ADLs (bathing, care of mouth/teeth, toileting, grooming, dressing, etc )  - Assess/evaluate cause of self-care deficits   - Assess range of motion  - Assess patient's mobility; develop plan if impaired  - Assess patient's need for assistive devices and provide as appropriate  - Encourage maximum independence but intervene and supervise when necessary  - Involve family in performance of ADLs  - Assess for home care needs following discharge   - Consider OT consult to assist with ADL evaluation and planning for discharge  - Provide patient education as appropriate  Outcome: Progressing  Goal: Maintains/Returns to pre admission functional level  Description: INTERVENTIONS:  - Perform BMAT or MOVE assessment daily    - Set and communicate daily mobility goal to care team and patient/family/caregiver  - Collaborate with rehabilitation services on mobility goals if consulted  - Perform Range of Motion 3 times a day  - Reposition patient every 2 hours    - Dangle patient 3 times a day  - Stand patient 3 times a day  - Ambulate patient 3 times a day  - Out of bed to chair 3 times a day   - Out of bed for meals 3 times a day  - Out of bed for toileting  - Record patient progress and toleration of activity level   Outcome: Progressing  Goal: Patient will remain free of falls  Description: INTERVENTIONS:  - Educate patient/family on patient safety including physical limitations  - Instruct patient to call for assistance with activity   - Consult OT/PT to assist with strengthening/mobility   - Keep Call bell within reach  - Keep bed low and locked with side rails adjusted as appropriate  - Keep care items and personal belongings within reach  - Initiate and maintain comfort rounds  - Make Fall Risk Sign visible to staff  - Offer Toileting every 2 Hours, in advance of need  - Initiate/Maintain bed alarm  - Obtain necessary fall risk management equipment: alarm, socks  - Apply yellow socks and bracelet for high fall risk patients  - Consider moving patient to room near nurses station  Outcome: Progressing     Problem: DISCHARGE PLANNING  Goal: Discharge to home or other facility with appropriate resources  Description: INTERVENTIONS:  - Identify barriers to discharge w/patient and caregiver  - Arrange for needed discharge resources and transportation as appropriate  - Identify discharge learning needs (meds, wound care, etc )  - Arrange for interpretive services to assist at discharge as needed  - Refer to Case Management Department for coordinating discharge planning if the patient needs post-hospital services based on physician/advanced practitioner order or complex needs related to functional status, cognitive ability, or social support system  Outcome: Progressing     Problem: Knowledge Deficit  Goal: Patient/family/caregiver demonstrates understanding of disease process, treatment plan, medications, and discharge instructions  Description: Complete learning assessment and assess knowledge base    Interventions:  - Provide teaching at level of understanding  - Provide teaching via preferred learning methods  Outcome: Progressing

## 2023-06-11 NOTE — ASSESSMENT & PLAN NOTE
· From hypothyroidism? · No SIRS  · Non-focal  · PT OT evals   · PCP has been concerned about patient safety at home

## 2023-06-12 ENCOUNTER — APPOINTMENT (INPATIENT)
Dept: MRI IMAGING | Facility: HOSPITAL | Age: 73
DRG: 643 | End: 2023-06-12
Payer: MEDICARE

## 2023-06-12 PROBLEM — E53.8 B12 DEFICIENCY: Status: ACTIVE | Noted: 2023-06-11

## 2023-06-12 LAB
ANION GAP SERPL CALCULATED.3IONS-SCNC: 7 MMOL/L (ref 4–13)
BUN SERPL-MCNC: 14 MG/DL (ref 5–25)
CALCIUM SERPL-MCNC: 8.7 MG/DL (ref 8.4–10.2)
CHLORIDE SERPL-SCNC: 103 MMOL/L (ref 96–108)
CO2 SERPL-SCNC: 31 MMOL/L (ref 21–32)
CREAT SERPL-MCNC: 1.56 MG/DL (ref 0.6–1.3)
FOLATE SERPL-MCNC: 12.9 NG/ML
GFR SERPL CREATININE-BSD FRML MDRD: 43 ML/MIN/1.73SQ M
GLUCOSE SERPL-MCNC: 95 MG/DL (ref 65–140)
POTASSIUM SERPL-SCNC: 3.2 MMOL/L (ref 3.5–5.3)
SODIUM SERPL-SCNC: 141 MMOL/L (ref 135–147)
T4 FREE SERPL-MCNC: <0.25 NG/DL (ref 0.61–1.12)
VIT B12 SERPL-MCNC: 721 PG/ML (ref 180–914)

## 2023-06-12 PROCEDURE — 90677 PCV20 VACCINE IM: CPT | Performed by: PHYSICIAN ASSISTANT

## 2023-06-12 PROCEDURE — 70551 MRI BRAIN STEM W/O DYE: CPT

## 2023-06-12 PROCEDURE — 80048 BASIC METABOLIC PNL TOTAL CA: CPT | Performed by: PHYSICIAN ASSISTANT

## 2023-06-12 PROCEDURE — 99232 SBSQ HOSP IP/OBS MODERATE 35: CPT | Performed by: PHYSICIAN ASSISTANT

## 2023-06-12 PROCEDURE — 82607 VITAMIN B-12: CPT | Performed by: PHYSICIAN ASSISTANT

## 2023-06-12 RX ADMIN — HEPARIN SODIUM 5000 UNITS: 5000 INJECTION INTRAVENOUS; SUBCUTANEOUS at 14:11

## 2023-06-12 RX ADMIN — PNEUMOCOCCAL 20-VALENT CONJUGATE VACCINE 0.5 ML
2.2; 2.2; 2.2; 2.2; 2.2; 2.2; 2.2; 2.2; 2.2; 2.2; 2.2; 2.2; 2.2; 2.2; 2.2; 2.2; 4.4; 2.2; 2.2; 2.2 INJECTION, SUSPENSION INTRAMUSCULAR at 21:38

## 2023-06-12 RX ADMIN — LATANOPROST 1 DROP: 50 SOLUTION OPHTHALMIC at 21:41

## 2023-06-12 RX ADMIN — LEVOTHYROXINE SODIUM 112 MCG: 112 TABLET ORAL at 05:30

## 2023-06-12 RX ADMIN — CYANOCOBALAMIN TAB 500 MCG 1000 MCG: 500 TAB at 08:24

## 2023-06-12 RX ADMIN — HEPARIN SODIUM 5000 UNITS: 5000 INJECTION INTRAVENOUS; SUBCUTANEOUS at 05:30

## 2023-06-12 RX ADMIN — HEPARIN SODIUM 5000 UNITS: 5000 INJECTION INTRAVENOUS; SUBCUTANEOUS at 21:38

## 2023-06-12 RX ADMIN — PRAVASTATIN SODIUM 40 MG: 40 TABLET ORAL at 15:56

## 2023-06-12 RX ADMIN — SODIUM CHLORIDE, SODIUM GLUCONATE, SODIUM ACETATE, POTASSIUM CHLORIDE, MAGNESIUM CHLORIDE, SODIUM PHOSPHATE, DIBASIC, AND POTASSIUM PHOSPHATE 100 ML/HR: .53; .5; .37; .037; .03; .012; .00082 INJECTION, SOLUTION INTRAVENOUS at 00:31

## 2023-06-12 RX ADMIN — FERROUS SULFATE TAB 325 MG (65 MG ELEMENTAL FE) 325 MG: 325 (65 FE) TAB at 08:25

## 2023-06-12 NOTE — ASSESSMENT & PLAN NOTE
· , free T4 <0 25  · BP, HR, temp stable  Memory loss but no lethargy  · Resume home levothyroxine 112 mcg daily and repeat TSH in 4 weeks

## 2023-06-12 NOTE — ASSESSMENT & PLAN NOTE
· Generalized muscle weakness POA  · Progressive decreased mobility and endurance reported by family, reduced ability for self care/ADLs  · Question if related to hypothyroidism vs  Generalized debility  · MRI brain without acute abnormality  · At this time patient does not appear safe from a cognitive standpoint to live at home independently  Cannot remember to take medications, has been driving 21+ minutes to and from work but cannot tell me the town we are in and driving up and back on the driveway 9 times, states it is January of 1940 and is asking me if I hit the [employer] and stole a million dollars from him     · Awaiting PT and OT evaluations/recommendations

## 2023-06-12 NOTE — ASSESSMENT & PLAN NOTE
· Patient with progressive and ongoing memory decline over last several months  First noted around a year ago and then last 6 months sharp decline  · Lives with ex-wife, drives 30 mins to work at [employer] says he drives up and down the driveway 8-9 times/day  · No indication for psych consult - will cancel at this time  · Await OT cog evaluation  · Await PT evaluation  · MRI brain pending  · CT head, CXR negative  UA without evidence of infection  · Check B12  ·  but without myxedema symptoms/signs   Levothyroxine resumed this admission  · Needs outpatient geriatrics evaluation

## 2023-06-12 NOTE — ASSESSMENT & PLAN NOTE
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

## 2023-06-12 NOTE — ASSESSMENT & PLAN NOTE
· Question if related to hypothyroidism vs  Generalized debility  · No focal deficits  · MRI brain pending  · At this time patient does not appear safe from a cognitive standpoint to live at home independently  Cannot remember to take medications, has been driving 42+ minutes to and from work but cannot tell me the town we are in, states it is [place] 1940 and is asking me if I hit the [employer] and stole a million dollars from him

## 2023-06-12 NOTE — PROGRESS NOTES
[place]  Progress Note  Name: Diogenes Lackey  MRN: [id number]  Unit/Bed#: -01 I Date of Admission: 6/11/2023   Date of Service: 6/12/2023 I Hospital Day: 0    Assessment/Plan   * Memory loss  Assessment & Plan  · Patient with progressive and ongoing memory decline over last several months  First noted around a year ago and then last 6 months sharp decline  · Lives with ex-wife, drives 30 mins to work at [employer] says he drives up and down the driveway 8-9 times/day  · No indication for psych consult - will cancel at this time  · Await OT cog evaluation  · Await PT evaluation  · MRI brain pending  · CT head, CXR negative  UA without evidence of infection  · Check B12  ·  but without myxedema symptoms/signs  Levothyroxine resumed this admission  · Needs outpatient geriatrics evaluation    Generalized weakness  Assessment & Plan  · Question if related to hypothyroidism vs  Generalized debility  · No focal deficits  · MRI brain pending  · At this time patient does not appear safe from a cognitive standpoint to live at home independently  Cannot remember to take medications, has been driving 15+ minutes to and from work but cannot tell me the town we are in, states it is January of 1940 and is asking me if I hit the [employer] and stole a million dollars from him  Hypothyroidism  Assessment & Plan  · , free T4 <0 25  · BP, HR, temp stable  Memory loss but no lethargy  · Resume home levothyroxine 112 mcg daily and repeat TSH in 4 weeks       Elevated serum creatinine  Assessment & Plan  Lab Results   Component Value Date    CREATININE 1 56 (H) 06/12/2023    CREATININE 1 70 (H) 06/11/2023    CREATININE 1 59 (H) 06/10/2023    EGFR 43 06/12/2023    EGFR 38 06/11/2023    EGFR 42 06/10/2023     · Unknown recent baseline creat - prior from 2020 was 1 14  · Improved to 1 56 this AM (was 1 7 yesterday and 1 59 day prior)  · Appears euvolemic  · Stop IV fluids  · Hold home losartan for now  · Check PVR    Benign essential HTN  Assessment & Plan  · SBP 130s  · Hold losartan for now given unclear baseline creatinine    History of B12 deficiency  Assessment & Plan  · Not anemic at this time  · Continue Vit b12 supplementation (unclear if taking at home and history of deficiency)  · B12 level pending    TORI on CPAP  Assessment & Plan  · CPAP qhs       VTE Pharmacologic Prophylaxis: VTE Score: 3 Moderate Risk (Score 3-4) - Pharmacological DVT Prophylaxis Ordered: heparin  Patient Centered Rounds: I performed bedside rounds with nursing staff today  Discussions with Specialists or Other Care Team Provider: nursing    Education and Discussions with Family / Patient: Updated  (daughter in law) via phone  [phone]    Total Time Spent on Date of Encounter in care of patient: 45 minutes This time was spent on one or more of the following: performing physical exam; counseling and coordination of care; obtaining or reviewing history; documenting in the medical record; reviewing/ordering tests, medications or procedures; communicating with other healthcare professionals and discussing with patient's family/caregivers  Current Length of Stay: 0 day(s)  Current Patient Status: Observation   Certification Statement: The patient, admitted on an observation basis, will now require > 2 midnight hospital stay due to Patient is not safe to return to his prior living situation  He has cognitive decline and is unable to appropriately care for himself at home  He is not able to remember to take his medications  He has been driving to work and back and forth on the driveway multiple times  MRI pending and also need PT and OT evaluations  Discharge Plan: Anticipate discharge in 48 hrs to discharge location to be determined pending rehab evaluations  Code Status: Level 1 - Full Code    Subjective:   Patient reports that he is feeling okay    He states that he is not sure "what town he is in  Doesn't know who the president is  Wants to know if I won the lottery and if I stole money from him  Objective:     Vitals:   Temp (24hrs), Av 4 °F (36 3 °C), Min:97 2 °F (36 2 °C), Max:97 7 °F (36 5 °C)    Temp:  [97 2 °F (36 2 °C)-97 7 °F (36 5 °C)] 97 4 °F (36 3 °C)  HR:  [57-63] 60  Resp:  [17-20] 20  BP: (129-133)/(94-98) 133/98  SpO2:  [94 %-99 %] 94 %  Body mass index is 28 62 kg/m²  Input and Output Summary (last 24 hours): Intake/Output Summary (Last 24 hours) at 2023 1137  Last data filed at 2023 0850  Gross per 24 hour   Intake 1040 ml   Output 321 ml   Net 719 ml       Physical Exam:   Physical Exam  Vitals and nursing note reviewed  Constitutional:       General: He is not in acute distress  Appearance: Normal appearance  He is not ill-appearing or diaphoretic  HENT:      Head: Normocephalic and atraumatic  Cardiovascular:      Rate and Rhythm: Normal rate and regular rhythm  Pulmonary:      Effort: Pulmonary effort is normal       Breath sounds: Normal breath sounds  No stridor  No wheezing, rhonchi or rales  Abdominal:      General: Bowel sounds are normal       Palpations: Abdomen is soft  There is no mass  Tenderness: There is no abdominal tenderness  There is no guarding  Musculoskeletal:      Right lower leg: No edema  Left lower leg: No edema  Skin:     General: Skin is warm and dry  Neurological:      Mental Status: He is alert  Comments: Oriented to person, knows he is in the hospital but unsure where it is located, states doesn't know the president because \"we dont talk too often  \" states it is 1942   Psychiatric:         Mood and Affect: Mood normal          Behavior: Behavior normal           Additional Data:     Labs:  Results from last 7 days   Lab Units 23  1054   EOS PCT % 1   HEMATOCRIT % 45 1   HEMOGLOBIN g/dL 15 0   LYMPHS PCT % 29   MONOS PCT % 8   NEUTROS PCT % 61   PLATELETS " Thousands/uL 190   WBC Thousand/uL 4 59     Results from last 7 days   Lab Units 06/12/23  0529 06/11/23  1054   ANION GAP mmol/L 7 8   ALBUMIN g/dL  --  4 6   ALK PHOS U/L  --  48   ALT U/L  --  31   AST U/L  --  52*   BUN mg/dL 14 18   CALCIUM mg/dL 8 7 9 4   CHLORIDE mmol/L 103 101   CO2 mmol/L 31 29   CREATININE mg/dL 1 56* 1 70*   GLUCOSE RANDOM mg/dL 95 135   POTASSIUM mmol/L 3 2* 3 6   SODIUM mmol/L 141 138   TOTAL BILIRUBIN mg/dL  --  2 66*     Results from last 7 days   Lab Units 06/10/23  1259   INR  1 00                   Lines/Drains:  Invasive Devices     Peripheral Intravenous Line  Duration           Peripheral IV 06/11/23 Right Antecubital 1 day              Imaging: Reviewed radiology reports from this admission including: chest xray and CT head    Recent Cultures (last 7 days):         Last 24 Hours Medication List:   Current Facility-Administered Medications   Medication Dose Route Frequency Provider Last Rate   • acetaminophen  650 mg Oral Q6H PRN Abbey Arreguin PA-C     • aluminum-magnesium hydroxide-simethicone  30 mL Oral Q6H PRN Abbey Arreguin PA-C     • cyanocobalamin  1,000 mcg Oral Daily Abbey Arreguin PA-C     • ferrous sulfate  325 mg Oral Daily With Breakfast Abbey Arreguin PA-C     • heparin (porcine)  5,000 Units Subcutaneous Q8H [facility] Abbey Arreguin PA-C     • latanoprost  1 drop Both Eyes HS Abbey Arreguin PA-C     • levothyroxine  112 mcg Oral Early Morning Zan Ontiveros MD     • melatonin  6 mg Oral HS PRN Abbey Arreguin PA-C     • ondansetron  4 mg Intravenous Q6H PRN Abbey Arreguin PA-C     • pneumococcal 20-caio conj vacc  0 5 mL Intramuscular Once Abbey Arreguin PA-C     • polyethylene glycol  17 g Oral Daily PRN Abbey Arreguin PA-C     • pravastatin  40 mg Oral Daily With [place]'s PriBARBARA saldivar          Today, Patient Was Seen By: Abdoul Mercado PA-C    **Please Note: This note may have been constructed using a voice recognition system  **

## 2023-06-12 NOTE — ASSESSMENT & PLAN NOTE
Lab Results   Component Value Date    CREATININE 1 56 (H) 06/12/2023    CREATININE 1 70 (H) 06/11/2023    CREATININE 1 59 (H) 06/10/2023    EGFR 43 06/12/2023    EGFR 38 06/11/2023    EGFR 42 06/10/2023     · Unknown recent baseline creat - prior from 2020 was 1 14  · Improved to 1 56 this AM (was 1 7 yesterday and 1 59 day prior)  · Appears euvolemic  · Stop IV fluids  · Hold home losartan for now  · Check PVR

## 2023-06-12 NOTE — ASSESSMENT & PLAN NOTE
· Not anemic at this time  · Continue Vit b12 supplementation (unclear if taking at home and history of deficiency)  · B12 level pending

## 2023-06-12 NOTE — CASE MANAGEMENT
Case Management Assessment & Discharge Planning Note    Patient name Gonzalo Marrero  Location /-87 MRN [id number]  : 1950 Date 2023       Current Admission Date: 2023  Current Admission Diagnosis:Memory loss   Patient Active Problem List    Diagnosis Date Noted   • Hypothyroidism 2023   • Benign essential HTN 2023   • HLD (hyperlipidemia) 2023   • TORI on CPAP 2023   • Generalized weakness 2023   • Elevated serum creatinine 2023   • History of B12 deficiency 2023   • Macular degeneration 2023   • Glaucoma 2023   • Memory loss 2023   • Vertigo 2021      LOS (days): 0  Geometric Mean LOS (GMLOS) (days): 2 60  Days to GMLOS:2 5     OBJECTIVE:    Risk of Unplanned Readmission Score: 11 37      Current admission status: Inpatient    [facility] [address] [facility], [address] [facility] [address] [facility],[address]  [address] [facility]  [address] [facility] [address] [address]  Phone: [phone] Fax: [address] [place] [address] [id number] [facility][note 3], [address] [facility]  [address]  [address] [place] [address] [address] [address]  Phone: [phone] Fax: [phone]    Primary Care Provider: Geo Damon DO    Primary Insurance: MEDICARE  Secondary Insurance: AARP    ASSESSMENT:  Active Health Care Proxies     Danita Brownlee  [facility] Representative - Daughter In-Law   Primary Phone: [phone] (Mobile)               Advance Directives  Does patient have a [address] [place] [address] Avenue?: No  Was patient offered paperwork?: Yes  Does patient currently have a Health Care decision maker?: Yes, please see Health Care Proxy section  Does patient have Advance Directives?: No  Was patient offered paperwork?: Yes  Primary Contact: Te Ibarra - [phone]    Patient Information  Admitted from[de-identified] Home  Mental Status: Confused  During Assessment patient was accompanied by: Not accompanied during assessment  Assessment information provided by[de-identified] Daughter  Primary Caregiver: Self  Support Systems: Spouse/significant other, Son, Daughter, [address] [facility] of Residence: [address] [facility] do you live in?: [address] [facility] entry access options   Select all that apply : Stairs  Number of steps to enter home : One Flight  Type of Current Residence: Bi-level  Upon entering residence, is there a bedroom on the main floor (no further steps)?: No  A bedroom is located on the following floor levels of residence (select all that apply):: 2nd Floor  Upon entering residence, is there a bathroom on the main floor (no further steps)?: No  Indicate which floors of current residence have a bathroom (select all the apply):: 2nd Floor  Number of steps to 2nd floor from main floor: One Flight  In the last 12 months, was there a time when you were not able to pay the mortgage or rent on time?: No  In the last 12 months, how many places have you lived?: 1  In the last 12 months, was there a time when you did not have a steady place to sleep or slept in a shelter (including now)?: No  Homeless/housing insecurity resource given?: N/A  Living Arrangements: Lives w/ Spouse/significant other  Is patient a ?: Yes    Activities of Daily Living Prior to Admission  Functional Status: Independent  Completes ADLs independently?: Yes  Ambulates independently?: Yes  Does patient use assisted devices?: Yes  Assisted Devices (DME) used: CPAP  Does patient currently own DME?: Yes  What DME does the patient currently own?: CPAP  Does patient have a history of Outpatient Therapy (PT/OT)?: Yes  Does the patient have a history of Short-Term Rehab?: No  Does patient have a history of HHC?: No  [facility] ?: No    Patient Information Continued  Income Source: Employed  Does patient have prescription coverage?: Yes  Within the past 12 months, you worried that your food would run out before you got the money to buy more : Never true  Within the past 12 months, the food you bought just didn't last and you didn't have money to get more : Never true  Food insecurity resource given?: N/A  Does patient receive dialysis treatments?: No  Does patient have a history of substance abuse?: No  Does patient have a history of Mental Health Diagnosis?: No    Means of Transportation  Means of Transport to Appts[de-identified] Drives Self  In the past 12 months, has lack of transportation kept you from medical appointments or from getting medications?: No  In the past 12 months, has lack of transportation kept you from meetings, work, or from getting things needed for daily living?: No  Was application for public transport provided?: N/A    DISCHARGE DETAILS:    Discharge planning discussed with[de-identified] pt's dtr in law who is primary contact     CM contacted family/caregiver?: Yes  Were Treatment Team discharge recommendations reviewed with patient/caregiver?: Yes  Did patient/caregiver verbalize understanding of patient care needs?: Yes  Were patient/caregiver advised of the risks associated with not following Treatment Team discharge recommendations?: Yes    Contacts  Patient Contacts: Yoana Roland  Relationship to Patient[de-identified] Family  Contact Method: Phone  Phone Number: [phone]  Reason/Outcome: Continuity of Care, Emergency Contact, Discharge [address]         Is the patient interested in [facility] at discharge?: No (needs TBD)    DME Referral Provided  Referral made for DME?: No    Other Referral/Resources/Interventions Provided:  Referral Comments: TBD based on PT/OT recommendations  IMM Given (Date):: 06/12/23  IMM Given to[de-identified] Family  Family notified[de-identified] Notified pt's dtr in law about Medicare Rights  Copy placed in room  Additional Comments: Cm spoke with pt's dtr in law who is main contact due to availability and her hx in medical field  Pt lives with his SO (was his ex wife, they have been back together for 25 years but they last few years have been josé per pt's children)   Pt was recently in [facility] ED but signed out AMA  Pt lives in a bilevel with 16 CHAZ to the main level  Dtr in law states his gait is off sometimes  Pt is normally mobility independent  He uses a CPAP but does not know the name of the supplier  Pt has had outpt PT and OT  No hx of VNA, STR, MH, or D/A tx  Dtr in law states his memory is declining and she does not feel he is safe to return home  [facility] [place]  He drives himself to his PT job in [employer]

## 2023-06-13 VITALS
BODY MASS INDEX: 28.71 KG/M2 | HEIGHT: 69 IN | DIASTOLIC BLOOD PRESSURE: 89 MMHG | HEART RATE: 68 BPM | TEMPERATURE: 97.7 F | OXYGEN SATURATION: 95 % | RESPIRATION RATE: 16 BRPM | WEIGHT: 193.8 LBS | SYSTOLIC BLOOD PRESSURE: 138 MMHG

## 2023-06-13 LAB
ATRIAL RATE: 71 BPM
P AXIS: 70 DEGREES
PR INTERVAL: 206 MS
QRS AXIS: -6 DEGREES
QRSD INTERVAL: 96 MS
QT INTERVAL: 386 MS
QTC INTERVAL: 419 MS
T WAVE AXIS: 51 DEGREES
VENTRICULAR RATE: 71 BPM

## 2023-06-13 PROCEDURE — 93010 ELECTROCARDIOGRAM REPORT: CPT | Performed by: INTERNAL MEDICINE

## 2023-06-13 PROCEDURE — 97166 OT EVAL MOD COMPLEX 45 MIN: CPT

## 2023-06-13 PROCEDURE — 99239 HOSP IP/OBS DSCHRG MGMT >30: CPT | Performed by: PHYSICIAN ASSISTANT

## 2023-06-13 PROCEDURE — 99232 SBSQ HOSP IP/OBS MODERATE 35: CPT | Performed by: PHYSICIAN ASSISTANT

## 2023-06-13 PROCEDURE — 97129 THER IVNTJ 1ST 15 MIN: CPT

## 2023-06-13 PROCEDURE — 97130 THER IVNTJ EA ADDL 15 MIN: CPT

## 2023-06-13 PROCEDURE — 97163 PT EVAL HIGH COMPLEX 45 MIN: CPT

## 2023-06-13 RX ORDER — LOVASTATIN 40 MG/1
80 TABLET ORAL EVERY 24 HOURS
Refills: 0
Start: 2023-06-13

## 2023-06-13 RX ORDER — LEVOTHYROXINE SODIUM 0.12 MG/1
125 TABLET ORAL EVERY MORNING
Start: 2023-06-13 | End: 2023-06-30

## 2023-06-13 RX ORDER — LEVOTHYROXINE SODIUM 112 UG/1
112 TABLET ORAL EVERY MORNING
Qty: 30 TABLET | Refills: 0 | Status: SHIPPED | OUTPATIENT
Start: 2023-06-13 | End: 2023-06-13 | Stop reason: SDUPTHER

## 2023-06-13 RX ORDER — FERROUS SULFATE 325(65) MG
325 TABLET ORAL
Refills: 0
Start: 2023-06-13

## 2023-06-13 RX ORDER — POTASSIUM CHLORIDE 20 MEQ/1
20 TABLET, EXTENDED RELEASE ORAL ONCE
Status: COMPLETED | OUTPATIENT
Start: 2023-06-13 | End: 2023-06-13

## 2023-06-13 RX ADMIN — POTASSIUM CHLORIDE 20 MEQ: 1500 TABLET, EXTENDED RELEASE ORAL at 10:16

## 2023-06-13 RX ADMIN — LEVOTHYROXINE SODIUM 112 MCG: 112 TABLET ORAL at 06:28

## 2023-06-13 RX ADMIN — FERROUS SULFATE TAB 325 MG (65 MG ELEMENTAL FE) 325 MG: 325 (65 FE) TAB at 08:21

## 2023-06-13 RX ADMIN — HEPARIN SODIUM 5000 UNITS: 5000 INJECTION INTRAVENOUS; SUBCUTANEOUS at 06:28

## 2023-06-13 RX ADMIN — CYANOCOBALAMIN TAB 500 MCG 1000 MCG: 500 TAB at 08:21

## 2023-06-13 NOTE — ASSESSMENT & PLAN NOTE
Lab Results   Component Value Date    CREATININE 1 56 (H) 06/12/2023    CREATININE 1 70 (H) 06/11/2023    CREATININE 1 59 (H) 06/10/2023    EGFR 43 06/12/2023    EGFR 38 06/11/2023    EGFR 42 06/10/2023     · Unknown recent baseline creat - prior from 2020 was 1 14  · Improved to 1 56 6/12 AM (was 1 7 6/11 and 1 59 day prior)  · Appears euvolemic  · Holding further IV fluids  · Hold home losartan for now  · Check PVR - discussed with RN

## 2023-06-13 NOTE — PHYSICAL THERAPY NOTE
"                                                                                  PHYSICAL THERAPY EVALUATION NOTE    Patient Name: Bakari LAY Date: 2023    AGE:   74 y o  Mrn:   [id number]  ADMIT DX:  Confusion [R41 0]  Weakness [R53 1]  Hypothyroidism [E03 9]    Past Medical History:   Diagnosis Date    Disease of thyroid gland     Hyperlipidemia     Hypertension      Length Of Stay: 1  PHYSICAL THERAPY EVALUATION :   Patient's identity confirmed via 2 patient identifiers (full name and ) at start of session       23 1245   PT Last Visit   PT Visit Date 23   Note Type   Note type Evaluation   Pain Assessment   Pain Assessment Tool 0-10   Pain Score No Pain   Restrictions/Precautions   Weight Bearing Precautions Per Order No   Other Precautions Cognitive; Chair Alarm; Bed Alarm; Fall Risk   Home Living   Type of Home House  (Bilevel)   Home Layout Two level;Bed/bath upstairs  (16 CHAZ)   Additional Comments Pt declines use of AD at baseline   Prior Function   Level of [place] Independent with ADLs; Independent with functional mobility   Lives With Significant other   Receives Help From Family  (son, daughter in law)   IADLs Independent with driving   Vocational Part time employment  ([email or web address] (4 days a week from 5p-9p))   General   Family/Caregiver Present Yes  (Son)   Cognition   Overall Cognitive Status Impaired   Arousal/Participation Alert   Orientation Level Oriented to person;Disoriented to place; Disoriented to time;Disoriented to situation  (1928)   Memory Decreased short term memory;Decreased recall of recent events   Following Commands Follows one step commands with increased time or repetition   Comments (S)  Pt pleasantly confused, see OT eval for cog eval   Subjective   Subjective \"You can tell them my bottle of [employer] ran out\"   RLE Assessment   RLE Assessment WFL   LLE Assessment   LLE Assessment WFL   Vision-Basic Assessment   Current Vision Wears " glasses all the time   Visual History Macular degeneration   Bed Mobility   Supine to Sit Unable to assess   Transfers   Sit to Stand 6  Modified independent   Additional items Assist x 1   Stand to Sit 6  Modified independent   Additional items Assist x 1   Additional Comments Pt seated OOB in recliner chair at start and end of session   Ambulation/Elevation   Gait pattern Decreased foot clearance  (mild shuffling)   Gait Assistance 5  Supervision  (distant S)   Additional items Assist x 1   Assistive Device None   Distance 150 ft   Ambulation/Elevation Additional Comments no LOB   Balance   Static Sitting Good   Static Standing Good   Ambulatory Fair +   Activity Tolerance   Activity Tolerance   (limited due to cognitive deficits)   Medical Staff Made Aware OT Deborah   Nurse Made Aware SHEFALI Casas   Assessment   Problem List Decreased strength;Decreased endurance; Impaired balance;Decreased mobility; Decreased cognition;Decreased safety awareness   Assessment Yaniv Guaman is a 74 y o  Male who presents to [address] [place] [address] on 6/11/2023 from home w/ c/o memory deficits and diagnosis of memory loss  Orders for PT eval and treat received  Pt presents w/ comorbidities of HTN, TORI, HLD, macular degeneration  At baseline, pt mobilizes I w/ no AD  Upon evaluation, pt presents w/ the following deficits: impaired balance, impaired vision and gait deviations and cognitive deficits  Upon eval, pt requires mod I for transfers, and S for gait  Based on this PT evaluation today, patient's discharge recommendation is for Level IV  Given the above findings from this evaluation, at this time this patient does not require skilled inpatient PT for the remainder of this admission  Will D/C patient from PT caseload, please reconsult if any changes or needs arise     Goals   Patient Goals none stated   Recommendation   UB Rehab Discharge Recommendation (PT/OT) Level 4  (24/7 supervision for safety)   AM-PAC Basic Mobility Inpatient   Turning in Flat Bed Without Bedrails 3   Lying on Back to Sitting on Edge of Flat Bed Without Bedrails 3   Moving Bed to Chair 3   Standing Up From Chair Using Arms 4   Walk in Room 3   Climb 3-5 Stairs With Railing 3   Basic Mobility Inpatient Raw Score 19   Basic Mobility Standardized Score 42 48   Highest Level Of Mobility   JH-HLM Goal 6: Walk 10 steps or more   JH-HLM Achieved 7: Walk 25 feet or more   End of Consult   Patient Position at End of Consult Bedside chair  (w/ OT Deborah)           The patient's AM-PAC Basic Mobility Inpatient Short Form Raw Score is 19, Standardized Score is 42 48  A standardized score greater than 38 32 (raw score of 16) suggests the patient may benefit from discharge to home which may not coincide with above PT recommendations  However please refer to therapist recommendation for discharge planning given other factors that may influence destination  Given the above findings from this evaluation, at this time this patient does not require skilled inpatient PT for the remainder of this admission  Will D/C patient from PT caseload, please reconsult if any changes or needs arise        Lavell Snowden, PT, DPT

## 2023-06-13 NOTE — ASSESSMENT & PLAN NOTE
· , free T4 <0 25  · BP, HR, temp stable  Memory loss but no lethargy  · Resume home levothyroxine daily and repeat TSH in 4 weeks     · Confirmed with med bottle pictures from son was to be on 125 mcg daily - pill bottle for 90 days was full/patient was not taking  · Son to use these pills he has at home and ensure patient takes this daily

## 2023-06-13 NOTE — ASSESSMENT & PLAN NOTE
Lab Results   Component Value Date    CREATININE 1 56 (H) 06/12/2023    CREATININE 1 70 (H) 06/11/2023    CREATININE 1 59 (H) 06/10/2023    EGFR 43 06/12/2023    EGFR 38 06/11/2023    EGFR 42 06/10/2023     · Unknown recent baseline creat - prior from 2020 was 1 14  · Improved to 1 56 6/12 AM (was 1 7 6/11 and 1 59 day prior)  · outpt BMP  · Appears euvolemic  · Was not taking losartan at home and BP stable this admit and thus hold on any antihypertensives on discharge

## 2023-06-13 NOTE — PROGRESS NOTES
[place]  Progress Note  Name: Tres Cooley  MRN: [id number]  Unit/Bed#: -01 I Date of Admission: 6/11/2023   Date of Service: 6/13/2023 I Hospital Day: 1    Assessment/Plan   * Memory loss  Assessment & Plan  · Patient with progressive and ongoing memory decline over last several months  · First noted around a year ago and then last 6 months sharp decline  · Possible Metabolic encephalopathy secondary to thyroid dysfunction  · Lives with ex-wife, drives 30 mins to work at [employer] says he drives up and down the driveway 8-9 times/day  · No indication for psych consult - canceled on 6/12  · Await OT cog evaluation  Still driving - will perform MOCA  · Await PT evaluation  · MRI brain: No acute ischemia  Stable old lacunar infarct in the right temporal lobe  Microangiopathy  · CT head, CXR negative  UA without evidence of infection  · B12 WNL  ·  but without myxedema symptoms/signs  Levothyroxine resumed this admission  · Needs outpatient geriatrics evaluation    Generalized weakness  Assessment & Plan  · Generalized muscle weakness POA  · Progressive decreased mobility and endurance reported by family, reduced ability for self care/ADLs  · Question if related to hypothyroidism vs  Generalized debility  · MRI brain without acute abnormality  · At this time patient does not appear safe from a cognitive standpoint to live at home independently  Cannot remember to take medications, has been driving 61+ minutes to and from work but cannot tell me the town we are in and driving up and back on the driveway 9 times, states it is January of 1940 and is asking me if I hit the [employer] and stole a million dollars from him  · Awaiting PT and OT evaluations/recommendations    Hypothyroidism  Assessment & Plan  · , free T4 <0 25  · BP, HR, temp stable  Memory loss but no lethargy  · Resume home levothyroxine 112 mcg daily and repeat TSH in 4 weeks  Elevated serum creatinine  Assessment & Plan  Lab Results   Component Value Date    CREATININE 1 56 (H) 06/12/2023    CREATININE 1 70 (H) 06/11/2023    CREATININE 1 59 (H) 06/10/2023    EGFR 43 06/12/2023    EGFR 38 06/11/2023    EGFR 42 06/10/2023     · Unknown recent baseline creat - prior from 2020 was 1 14  · Improved to 1 56 6/12 AM (was 1 7 6/11 and 1 59 day prior)  · Appears euvolemic  · Holding further IV fluids  · Hold home losartan for now  · Check PVR - discussed with RN    Benign essential HTN  Assessment & Plan  · -150s  · Hold losartan for now given unclear baseline creatinine    History of B12 deficiency  Assessment & Plan  · Not anemic at this time  · Continue Vit b12 supplementation (unclear if taking at home however)  · B12 level 721    TORI on CPAP  Assessment & Plan  · CPAP qhs           VTE Pharmacologic Prophylaxis: VTE Score: 3 Moderate Risk (Score 3-4) - Pharmacological DVT Prophylaxis Ordered: heparin  Patient Centered Rounds: I performed bedside rounds with nursing staff today  Discussions with Specialists or Other Care Team Provider: nursing    Education and Discussions with Family / Patient: will update family post OT and PT evals  Total Time Spent on Date of Encounter in care of patient: 45 minutes This time was spent on one or more of the following: performing physical exam; counseling and coordination of care; obtaining or reviewing history; documenting in the medical record; reviewing/ordering tests, medications or procedures; communicating with other healthcare professionals and discussing with patient's family/caregivers      Current Length of Stay: 1 day(s)  Current Patient Status: Inpatient   Certification Statement: The patient will continue to require additional inpatient hospital stay due to awaiting PT and OT evals for safe dispo planning  Discharge Plan: Anticipate discharge in 24-48 hrs to discharge location to be determined pending rehab evaluations  Code Status: Level 1 - Full Code    Subjective:   Feeling fine this AM - no current complaints  Per RN up and OOB to bathroom without issue, urinating well  No chest pain or SOB    Objective:     Vitals:   Temp (24hrs), Av 6 °F (36 4 °C), Min:97 2 °F (36 2 °C), Max:98 1 °F (36 7 °C)    Temp:  [97 2 °F (36 2 °C)-98 1 °F (36 7 °C)] 98 1 °F (36 7 °C)  HR:  [57-67] 67  Resp:  [18-20] 18  BP: (137-154)/(86-95) 137/86  SpO2:  [96 %] 96 %  Body mass index is 28 62 kg/m²  Input and Output Summary (last 24 hours): Intake/Output Summary (Last 24 hours) at 2023 1056  Last data filed at 2023 0843  Gross per 24 hour   Intake 600 ml   Output 1000 ml   Net -400 ml       Physical Exam:   Physical Exam  Vitals and nursing note reviewed  Constitutional:       General: He is not in acute distress  Appearance: Normal appearance  He is not diaphoretic  HENT:      Head: Normocephalic and atraumatic  Mouth/Throat:      Mouth: Mucous membranes are moist    Cardiovascular:      Rate and Rhythm: Normal rate and regular rhythm  Pulmonary:      Effort: Pulmonary effort is normal       Breath sounds: Normal breath sounds  No stridor  No wheezing, rhonchi or rales  Abdominal:      General: Bowel sounds are normal       Palpations: Abdomen is soft  There is no mass  Tenderness: There is no abdominal tenderness  There is no guarding  Musculoskeletal:      Right lower leg: No edema  Left lower leg: No edema  Skin:     General: Skin is warm and dry  Neurological:      Mental Status: He is alert  Cranial Nerves: No cranial nerve deficit  Sensory: No sensory deficit  Motor: No weakness  Comments: Oriented to person only  Not to place or time  Follows commands  Speech clear  MS 5/5 in all 4 extremities  Pleasantly confused      Psychiatric:         Mood and Affect: Mood normal          Behavior: Behavior normal           Additional Data:     Labs:  Results from last 7 days   Lab Units 06/11/23  1054   EOS PCT % 1   HEMATOCRIT % 45 1   HEMOGLOBIN g/dL 15 0   LYMPHS PCT % 29   MONOS PCT % 8   NEUTROS PCT % 61   PLATELETS Thousands/uL 190   WBC Thousand/uL 4 59     Results from last 7 days   Lab Units 06/12/23  0529 06/11/23  1054   ANION GAP mmol/L 7 8   ALBUMIN g/dL  --  4 6   ALK PHOS U/L  --  48   ALT U/L  --  31   AST U/L  --  52*   BUN mg/dL 14 18   CALCIUM mg/dL 8 7 9 4   CHLORIDE mmol/L 103 101   CO2 mmol/L 31 29   CREATININE mg/dL 1 56* 1 70*   GLUCOSE RANDOM mg/dL 95 135   POTASSIUM mmol/L 3 2* 3 6   SODIUM mmol/L 141 138   TOTAL BILIRUBIN mg/dL  --  2 66*     Results from last 7 days   Lab Units 06/10/23  1259   INR  1 00                   Lines/Drains:  Invasive Devices     Peripheral Intravenous Line  Duration           Peripheral IV 06/11/23 Right Antecubital 2 days                      Imaging: No pertinent imaging reviewed  Recent Cultures (last 7 days):         Last 24 Hours Medication List:   Current Facility-Administered Medications   Medication Dose Route Frequency Provider Last Rate   • acetaminophen  650 mg Oral Q6H PRN Abbey Arreguin PA-C     • aluminum-magnesium hydroxide-simethicone  30 mL Oral Q6H PRN Abbey Arreguin PA-C     • cyanocobalamin  1,000 mcg Oral Daily Abbey Arreguin PA-C     • ferrous sulfate  325 mg Oral Daily With Breakfast Abbey Arreguin PA-C     • heparin (porcine)  5,000 Units Subcutaneous Q8H [address] Abbey Arreguin PA-C     • latanoprost  1 drop Both Eyes HS Abbey Arreguin PA-C     • levothyroxine  112 mcg Oral Early Morning Sherrell Duarte MD     • melatonin  6 mg Oral HS PRN Abbey Arreguin PA-C     • ondansetron  4 mg Intravenous Q6H PRN Abbey Arreguin PA-C     • polyethylene glycol  17 g Oral Daily PRN Abbey Arreguin PA-C     • pravastatin  40 mg Oral Daily With [employer] PriBARBARA saldivar          Today, Patient Was Seen By: Deb Chau PA-C    **Please Note: This note may have been constructed using a voice recognition system  **

## 2023-06-13 NOTE — DISCHARGE SUMMARY
[place]  Discharge- Tanya Randle [birth year], 74 y o  male MRN: [id number]  Unit/Bed#: -01 Encounter: [id number]  Primary Care Provider: Nicole Blas DO   Date and time admitted to hospital: 6/11/2023 10:43 AM    * Memory loss  Assessment & Plan  · Patient with progressive and ongoing memory decline over last several months  · First noted around a year ago and then last 6 months sharp decline  · Possible Metabolic encephalopathy secondary to thyroid dysfunction  · Lives with ex-wife, drives 30 mins to work at [employer] says he drives up and down the driveway 8-9 times/day  · No indication for psych consult - canceled on 6/12  · [facility] 8/30  · Informed patient/family not to allow patient to drive  [employer] form completed and faxed 6/13/23  · MRI brain: No acute ischemia  Stable old lacunar infarct in the right temporal lobe  Microangiopathy  · CT head, CXR negative  UA without evidence of infection  · B12 WNL  ·  but without myxedema symptoms/signs  Levothyroxine resumed this admission - was not taking at home as had full pill bottles  · Needs outpatient geriatrics evaluation    Generalized weakness  Assessment & Plan  · Generalized muscle weakness POA  · Progressive decreased mobility and endurance reported by family, reduced ability for self care/ADLs  · Question if related to hypothyroidism vs  Generalized debility  · MRI brain without acute abnormality  · At this time patient does not appear safe from a cognitive standpoint to live at home independently  Cannot remember to take medications, has been driving 05+ minutes to and from work but cannot tell me the town we are in and driving up and back on the driveway 9 times, states it is January of 1940 and is asking me if I hit the [employer] and stole a million dollars from him  · Needs 24/7 supervision - going home with sons family and then will look into long term care   CM provided home care assistance information as well    Hypothyroidism  Assessment & Plan  · , free T4 <0 25  · BP, HR, temp stable  Memory loss but no lethargy  · Resume home levothyroxine daily and repeat TSH in 4 weeks  · Confirmed with med bottle pictures from son was to be on 125 mcg daily - pill bottle for 90 days was full/patient was not taking  · Son to use these pills he has at home and ensure patient takes this daily    Elevated serum creatinine  Assessment & Plan  Lab Results   Component Value Date    CREATININE 1 56 (H) 06/12/2023    CREATININE 1 70 (H) 06/11/2023    CREATININE 1 59 (H) 06/10/2023    EGFR 43 06/12/2023    EGFR 38 06/11/2023    EGFR 42 06/10/2023     · Unknown recent baseline creat - prior from 2020 was 1 14  · Improved to 1 56 6/12 AM (was 1 7 6/11 and 1 59 day prior)  · outpt BMP  · Appears euvolemic  · Was not taking losartan at home and BP stable this admit and thus hold on any antihypertensives on discharge    Benign essential HTN  Assessment & Plan  · -150s  · Hold losartan for now (prior prescribed to patient but was not taking) given unclear baseline creatinine and BP stable without oral agents  Was not taking any BP meds at home    History of B12 deficiency  Assessment & Plan  · Not anemic at this time  · Continue Vit b12 supplementation (unclear if taking at home however)  · B12 level 721    TORI on CPAP  Assessment & Plan  · CPAP qhs    Medical Problems     Resolved Problems  Date Reviewed: 6/13/2023   None       Discharging Physician / Practitioner: Madai Pittman PA-C  PCP: Cordell Novoa DO  Admission Date:   Admission Orders (From admission, onward)     Ordered        06/12/23 1138  Inpatient Admission  Once            06/11/23 1239  Place in Observation  Once                      Discharge Date: 06/13/23    Consultations During Hospital Stay:  · none    Procedures Performed:   · none    Significant Findings / Test Results:   · MRI brain: No acute ischemia   Stable old lacunar infarct in the right temporal lobe  Grossly stable nonspecific white matter change suggesting microangiopathy  · CT head:  No acute intracranial abnormality  · CXR: No acute cardiopulmonary disease  Incidental Findings:   · none     Test Results Pending at Discharge (will require follow up):   · none     Outpatient Tests Requested:  · Senior care follow up  · TSH 4 weeks    Complications:  none    Reason for Admission: confusion    Hospital Course:   Ira Cordero is a 74 y o  male patient who originally presented to the hospital on 6/11/2023 due to memory loss, cognitive decline over 6-12 months  Patients daughter in law provided additional information  Patient was noted to begin having memory loss approx 12 months ago and over last 6 months this has worsened substantially  He was still working at [employer]  He would drive to and from work 30 mins each way but neighbors would see him drive back and forth on the driveway around 9 times and ask him what he was doing  He was not taking meds (family found full pill bottles)  He was found to have a TSH of 130 and was started on levothyroxine  No symptoms or signs of myxedema  Was noted to have MRI which showed microangiopathy and prior CVA but otherwise no acute abnormality  No infectious signs/symptoms  Was recommended to resume levothyroxine at 125 mcg and repeat TSH in 4 weeks  MOCA performed by OT and patient scored 8/30  [place] [employer] notified and patient/family aware not to drive  Recommended for 24/7 assistance and family will take patient home to their house and look into caregivers/long term care  Referral sent to senior care for additional evaluation as outpt into memory loss/cognitive decline  Please see above list of diagnoses and related plan for additional information       Condition at Discharge: stable    Discharge Day Visit / Exam:   * Please refer to separate progress note for these details *    Discussion with Family: Updated  (son) at bedside  Discharge instructions/Information to patient and family:   See after visit summary for information provided to patient and family  Provisions for Follow-Up Care:  See after visit summary for information related to follow-up care and any pertinent home health orders  Disposition:   Home    Planned Readmission: no     Discharge Statement:  I spent 50 minutes discharging the patient  This time was spent on the day of discharge  I had direct contact with the patient on the day of discharge  Greater than 50% of the total time was spent examining patient, answering all patient questions, arranging and discussing plan of care with patient as well as directly providing post-discharge instructions  Additional time then spent on discharge activities  Discharge Medications:  See after visit summary for reconciled discharge medications provided to patient and/or family        **Please Note: This note may have been constructed using a voice recognition system**

## 2023-06-13 NOTE — PLAN OF CARE
Problem: INFECTION - ADULT  Goal: Absence or prevention of progression during hospitalization  Description: INTERVENTIONS:  - Assess and monitor for signs and symptoms of infection  - Monitor lab/diagnostic results  - Monitor all insertion sites, i e  indwelling lines, tubes, and drains  - Administer medications as ordered  - Instruct and encourage patient and family to use good hand hygiene technique  - Identify and instruct in appropriate isolation precautions for identified infection/condition  Outcome: Progressing     Problem: PAIN - ADULT  Goal: Verbalizes/displays adequate comfort level or baseline comfort level  Description: Interventions:  - Encourage patient to monitor pain and request assistance  - Assess pain using appropriate pain scale  - Administer analgesics based on type and severity of pain and evaluate response  - Implement non-pharmacological measures as appropriate and evaluate response  - Consider cultural and social influences on pain and pain management  - Notify physician/advanced practitioner if interventions unsuccessful or patient reports new pain  Outcome: Progressing

## 2023-06-13 NOTE — CASE MANAGEMENT
Case Management Discharge Planning Note    Patient name Иван Evangelista  Location /-84 MRN [id number]  : 1950 Date 2023       Current Admission Date: 2023  Current Admission Diagnosis:Memory loss   Patient Active Problem List    Diagnosis Date Noted   • Hypothyroidism 2023   • Benign essential HTN 2023   • HLD (hyperlipidemia) 2023   • TORI on CPAP 2023   • Generalized weakness 2023   • Elevated serum creatinine 2023   • History of B12 deficiency 2023   • Macular degeneration 2023   • Glaucoma 2023   • Memory loss 2023   • Vertigo 2021      LOS (days): 1  Geometric Mean LOS (GMLOS) (days): 5 10  Days to GMLOS:4     OBJECTIVE:  Risk of Unplanned Readmission Score: 11 55         Current admission status: Inpatient   Preferred Pharmacy:   [address] [facility] [address] [facility]  [address] [facility],[address]  [facility] [address]  Phone: [phone] Fax: [address] [facility] [place] [id number] [facility] [address]  [address] [place] [id number]  Phone: [phone] Fax: [phone]    Primary Care Provider: Anastasio Hashimoto, DO    Primary Insurance: MEDICARE  Secondary Insurance: AARP    DISCHARGE DETAILS:    Discharge planning discussed with[de-identified] pt's dtr in law     CM contacted family/caregiver?: Yes  Were Treatment Team discharge recommendations reviewed with patient/caregiver?: Yes  Did patient/caregiver verbalize understanding of patient care needs?: Yes  Were patient/caregiver advised of the risks associated with not following Treatment Team discharge recommendations?: Yes    Contacts  Patient Contacts: Funmi Mosquera  Relationship to Patient[de-identified] Family  Contact Method: Phone  Phone Number: [phone]  Reason/Outcome: Continuity of Care, Emergency Contact, Discharge [address]         Is the patient interested in [facility] at discharge?: No    DME Referral Provided  Referral made for DME?: No    Other Referral/Resources/Interventions Provided:  Interventions: Assisted Living  Referral Comments: Pt does not qualify for rehab but needs 24/7 supervision  Treatment Team Recommendation: Home  Discharge Destination Plan[de-identified] Home  Transport at Discharge : Family     Additional Comments: Pt does not qualify for rehab but needs 24/7 supervision  He is medically stable for discharge  Tonny reviewed this with pt's son in person and with pt's dtr in law over the phone  Discussed options for continued care  They will take patient to their home until he can be placed in an [address]  They gave permission [facility] [address] [place] spoke to Mackenzie Robison ([phone]) from [facility] to assist family with finding an appropriate facility  Pt will follow up with formal testing for dementia and memory as an outpatient  Pt's dtr in law will seek finding a walker for pt to use  Referral with clinicals to Care [facility]

## 2023-06-13 NOTE — DISCHARGE INSTR - AVS FIRST PAGE
Dear Amina Hewitt,     It was our pleasure to care for you here at [facility], [address] [place] [address]  It is our hope that we were always able to exceed the expected standards for your care during your stay  You were hospitalized due to memory loss  You were cared for on the 3rd floor by Penelope Mortensen PA-C under the service of Juan Jose Wallace MD with the [facility] who covers for your primary care physician (PCP), Shilpi Fair DO, while you were hospitalized  If you have any questions or concerns related to this hospitalization, you may contact us at [phone]  For follow up as well as any medication refills, we recommend that you follow up with your primary care physician  However, at this time we provide for you here, the most important instructions / recommendations at discharge:     Notable Medication Adjustments -   Take medications as prescribed  I have sent a new prescription for levothyroxine to the pharmacy for you in case you cannot find your old bottle (same dose as before)  Testing Required after Discharge -   Memory testing - senior care office should be calling to schedule an appt  ** Please contact your PCP to request testing orders for any of the testing recommended here **  Important follow up information -   Follow up with senior care/memory care team - their office should be calling to schedule an appt  Other Instructions -   If you develop new or concerning symptoms, please return to the ED immediately  DO NOT DRIVE  INDERJIT has been notified that you are not to be driving as well  Please review this entire after visit summary as additional general instructions including medication list, appointments, activity, diet, any pertinent wound care, and other additional recommendations from your care team that may be provided for you      Sincerely,   Penelope Mortensen PA-C

## 2023-06-13 NOTE — NURSING NOTE
All discharge paperwork, medications and follow-up appointments reviewed with patient and family bedside  Both verbalized understanding

## 2023-06-13 NOTE — ASSESSMENT & PLAN NOTE
· Not anemic at this time  · Continue Vit b12 supplementation (unclear if taking at home however)  · B12 level 721

## 2023-06-13 NOTE — OCCUPATIONAL THERAPY NOTE
Occupational Therapy Evaluation & Cognition Assessment     Patient Name: Luciana Andrade  [id number] Date: 6/13/2023  Problem List  Principal Problem:    Memory loss  Active Problems:    Hypothyroidism    Benign essential HTN    TORI on CPAP    Generalized weakness    Elevated serum creatinine    History of B12 deficiency    Past Medical History  Past Medical History:   Diagnosis Date    Disease of thyroid gland     Hyperlipidemia     Hypertension      Past Surgical History  Past Surgical History:   Procedure Laterality Date    HERNIA REPAIR      REPLACEMENT TOTAL KNEE BILATERAL             06/13/23 1335   OT Last Visit   OT Visit Date 06/13/23   Note Type   Note type Evaluation   Additional Comments + Cog eval   Pain Assessment   Pain Assessment Tool 0-10   Pain Score No Pain   Restrictions/Precautions   Weight Bearing Precautions Per Order No   Other Precautions Cognitive; Chair Alarm; Fall Risk   Home Living   Type of Home House  (Bi-level home)   Home Layout Two level;Bed/bath upstairs  (16 CHAZ)   Additional Comments Pt declines use of AD at baseline  Pt unable to provide accurate information  States he lives in an apartment  Looks to son for answers  Son confirms it is a bi-level home  Prior Function   Level of [place] Independent with ADLs; Independent with functional mobility   Lives With Significant other   Receives Help From Family  (Son & DIL)   IADLs (S)  Independent with driving   Vocational Part time employment  ([employer] at [employer])   Comments Per chart - pt drives up & down driveway 4-3D/GDY   Lifestyle   Autonomy Independent with ADLs, (+) drives   Reciprocal Relationships Lives with SO   Service to Others PTE at [employer]   General   Family/Caregiver Present Yes   Additional General Comments Son- Juan   ADL   Eating Assistance 7  Independent   Grooming Assistance 5  Supervision/Setup   UB Bathing Assistance 5  Supervision/Setup   LB Bathing Assistance 5  Supervision/Setup   UB Dressing Assistance 5  Supervision/Setup   LB Dressing Assistance 5  Supervision/Setup   Toileting Assistance  5  Supervision/Setup   Bed Mobility   Additional Comments Received OOB to recliner   Transfers   Sit to Stand 6  Modified independent   Stand to Sit 6  Modified independent   Functional Mobility   Functional Mobility 5  Supervision   Additional Comments Functional household distance   Balance   Static Sitting Good   Dynamic Sitting Good   Static Standing Good   Dynamic Standing Fair +   Ambulatory Fair +   Activity Tolerance   Activity Tolerance Patient tolerated treatment well   Medical Staff Made Aware Partial co-treat with PT GLADYS Herbert made aware   Nurse Made Aware SHEFALI Henning   RUE Assessment   RUE Assessment WFL   LUE Assessment   LUE Assessment WFL   Vision-Basic Assessment   Current Vision Wears glasses all the time   Visual History Macular degeneration;Glaucoma   Patient Visual Report (S)  Other (Comment)  (? if hallucinating or poor vision - pt pointing to & describing deer outside of his window  Informed RN)   Psychosocial   Patient Behaviors/Mood Pleasant; Cooperative  (Confused & forgetful)   Cognition   Overall Cognitive Status Impaired   Arousal/Participation Alert; Cooperative   Attention Attends with cues to redirect   Orientation Level Oriented to person;Disoriented to place; Disoriented to time;Disoriented to situation  (June 1930 in [facility])   Memory Decreased recall of precautions;Decreased recall of recent events;Decreased short term memory;Decreased long term memory   Following Commands Follows one step commands with increased time or repetition   Comments See below for [facility] REH[facility]ATION   Assessment   Limitation Decreased cognition   Prognosis Good   Assessment Pt is a 74 y o  male seen for OT evaluation at [facility], admitted 6/11/2023 w/ Memory loss  OT completed expanded review of pt's medical and social history   Comorbidities affecting pt's functional performance at time of assessment include: hypothyroidism, HTN, TORI on CPAP, generalized weakness, elevated serum creatinine, hx of B12 deficiency, macular degeneration & glaucoma  Prior to admission, pt was living with his is SO in a bi-level home with 16 CHAZ  Pt was independent with ADLs, independent for mobility & (+)   Upon evaluation, pt presents to OT at functional baseline  Based on findings, pt is of moderate complexity  The patient's raw score on the AM-PAC Daily Activity inpatient short form is 24, standardized score is 57 54, greater than 39 4  Patients at this level are likely to benefit from DC to home  Please refer to the recommendation of the Occupational Therapist for safe DC planning  At this time, OT recommendations at time of discharge are DC with level 4 resources  Recommend pt have 24/7 supervision for safety  No further acute OT needs indicated at this time - Recommend pt continue to be OOB for meals, ambulation to/from BR, perform self care tasks, and mobility in hallway with nursing  D/C from OT caseload with above recommendations     Goals   Patient Goals Pt does not verbalize meaningful goals   Plan   OT Frequency Eval only   Recommendation   UB Rehab Discharge Recommendation (PT/OT) Level 4  (24/7 d/t cognition)   AM-PAC Daily Activity Inpatient   Lower Body Dressing 4   Bathing 4   Toileting 4   Upper Body Dressing 4   Grooming 4   Eating 4   Daily Activity Raw Score 24   Daily Activity Standardized Score (Calc for Raw Score >=11) 57 54   AM-PAC Applied Cognition Inpatient   Following a Speech/Presentation 2   Understanding Ordinary Conversation 4   Taking Medications 2   Remembering Where Things Are Placed or Put Away 2   Remembering List of 4-5 Errands 2   Taking Care of Complicated Tasks 2   Applied Cognition Raw Score 14   Applied Cognition Standardized Score 32 02   MOCA   Version 7 1   Visuopatial/Executive 1   Naming 3   Memory 0   Attention: Digits 1   Attention: Letters 0   Attention: Serial 1   Language: Repeat 1   Language: Fluency 0   Abstraction 0   Delayed Recall 0   Orientation 1   Does patient have less than or equal to 12 years of education? 0   MOCA Total Score 8   MOCA Comments Pt seen for Den Cognitive Assessment ([address] [facility] [place])  Pt sitting upright, OOB in recliner  Ensured tray table in reach, TV off, door shut, & appropriate lighting  Pt scored 8/30 indicating moderate dementia or severe cognitive impairments  Pt's with this score are recommended to stop driving immediately  Pt scored a 8/30 which is indicative of moderate dementia/severe cognitive impairments  It is recommended that pt's with this score stop driving immediately  Pt's with moderate cognitive impairment may have marked memory loss, disorientation to time & place, decreased ability to make judgements, decreased ability to function independently, needs assistance with personal care, requires supervision when leaving the home or they are at risk of getting lost, have limited capacity to complete home tasks, and may no longer be able to participate in usual activities  Pt's assessment demonstrates impairments in visuospatial skills, executive functioning, immediate & delayed recall (STM/LTM), abstraction, language, impaired attention, and disorientation  Pt is unsafe to be home alone & is recommended to cease driving immediately  Pt appears to be at his functional baseline  Recommend pt receive 24/7 supervision upon DC for safety  Recommend pt continue to participate in self-care tasks & mobility with nursing  Additional Treatment Session   Start Time 8297   End Time 9454   Treatment Assessment MOCA assessment - see above for details   End of Consult   Education Provided Yes;Family or social support of family present for education by provider   Patient Position at End of Consult Bedside chair;Bed/Chair alarm activated; All needs within reach   Nurse Communication Nurse aware of consult     Armando Tatum OTR/L

## 2023-06-13 NOTE — ASSESSMENT & PLAN NOTE
· -150s  · Hold losartan for now (prior prescribed to patient but was not taking) given unclear baseline creatinine and BP stable without oral agents   Was not taking any BP meds at home

## 2023-06-13 NOTE — PLAN OF CARE
Problem: MOBILITY - ADULT  Goal: Maintain or return to baseline ADL function  Description: INTERVENTIONS:  -  Assess patient's ability to carry out ADLs; assess patient's baseline for ADL function and identify physical deficits which impact ability to perform ADLs (bathing, care of mouth/teeth, toileting, grooming, dressing, etc )  - Assess/evaluate cause of self-care deficits   - Assess range of motion  - Assess patient's mobility; develop plan if impaired  - Assess patient's need for assistive devices and provide as appropriate  - Encourage maximum independence but intervene and supervise when necessary  - Involve family in performance of ADLs  - Assess for home care needs following discharge   - Consider OT consult to assist with ADL evaluation and planning for discharge  - Provide patient education as appropriate  Outcome: Progressing  Goal: Maintains/Returns to pre admission functional level  Description: INTERVENTIONS:  - Perform BMAT or MOVE assessment daily    - Set and communicate daily mobility goal to care team and patient/family/caregiver  - Collaborate with rehabilitation services on mobility goals if consulted  - Perform Range of Motion 3 times a day  - Reposition patient every 2 hours    - Dangle patient 3 times a day  - Stand patient 3 times a day  - Ambulate patient 3 times a day  - Out of bed to chair 3 times a day   - Out of bed for meals 3 times a day  - Out of bed for toileting  - Record patient progress and toleration of activity level   Outcome: Progressing     Problem: PAIN - ADULT  Goal: Verbalizes/displays adequate comfort level or baseline comfort level  Description: Interventions:  - Encourage patient to monitor pain and request assistance  - Assess pain using appropriate pain scale  - Administer analgesics based on type and severity of pain and evaluate response  - Implement non-pharmacological measures as appropriate and evaluate response  - Consider cultural and social influences on pain and pain management  - Notify physician/advanced practitioner if interventions unsuccessful or patient reports new pain  Outcome: Progressing     Problem: INFECTION - ADULT  Goal: Absence or prevention of progression during hospitalization  Description: INTERVENTIONS:  - Assess and monitor for signs and symptoms of infection  - Monitor lab/diagnostic results  - Monitor all insertion sites, i e  indwelling lines, tubes, and drains  - Administer medications as ordered  - Instruct and encourage patient and family to use good hand hygiene technique  - Identify and instruct in appropriate isolation precautions for identified infection/condition  Outcome: Progressing     Problem: SAFETY ADULT  Goal: Maintain or return to baseline ADL function  Description: INTERVENTIONS:  -  Assess patient's ability to carry out ADLs; assess patient's baseline for ADL function and identify physical deficits which impact ability to perform ADLs (bathing, care of mouth/teeth, toileting, grooming, dressing, etc )  - Assess/evaluate cause of self-care deficits   - Assess range of motion  - Assess patient's mobility; develop plan if impaired  - Assess patient's need for assistive devices and provide as appropriate  - Encourage maximum independence but intervene and supervise when necessary  - Involve family in performance of ADLs  - Assess for home care needs following discharge   - Consider OT consult to assist with ADL evaluation and planning for discharge  - Provide patient education as appropriate  Outcome: Progressing  Goal: Maintains/Returns to pre admission functional level  Description: INTERVENTIONS:  - Perform BMAT or MOVE assessment daily    - Set and communicate daily mobility goal to care team and patient/family/caregiver  - Collaborate with rehabilitation services on mobility goals if consulted  - Perform Range of Motion 3 times a day  - Reposition patient every 2 hours    - Dangle patient 3 times a day  - Stand patient 3 times a day  - Ambulate patient 3 times a day  - Out of bed to chair 3 times a day   - Out of bed for meals 3 times a day  - Out of bed for toileting  - Record patient progress and toleration of activity level   Outcome: Progressing  Goal: Patient will remain free of falls  Description: INTERVENTIONS:  - Educate patient/family on patient safety including physical limitations  - Instruct patient to call for assistance with activity   - Consult OT/PT to assist with strengthening/mobility   - Keep Call bell within reach  - Keep bed low and locked with side rails adjusted as appropriate  - Keep care items and personal belongings within reach  - Initiate and maintain comfort rounds  - Make Fall Risk Sign visible to staff  - Offer Toileting every 2 Hours, in advance of need  - Initiate/Maintain bed alarm  - Obtain necessary fall risk management equipment: alarm, socks  - Apply yellow socks and bracelet for high fall risk patients  - Consider moving patient to room near nurses station  Outcome: Progressing     Problem: DISCHARGE PLANNING  Goal: Discharge to home or other facility with appropriate resources  Description: INTERVENTIONS:  - Identify barriers to discharge w/patient and caregiver  - Arrange for needed discharge resources and transportation as appropriate  - Identify discharge learning needs (meds, wound care, etc )  - Arrange for interpretive services to assist at discharge as needed  - Refer to Case Management Department for coordinating discharge planning if the patient needs post-hospital services based on physician/advanced practitioner order or complex needs related to functional status, cognitive ability, or social support system  Outcome: Progressing     Problem: Knowledge Deficit  Goal: Patient/family/caregiver demonstrates understanding of disease process, treatment plan, medications, and discharge instructions  Description: Complete learning assessment and assess knowledge base    Interventions:  - Provide teaching at level of understanding  - Provide teaching via preferred learning methods  Outcome: Progressing     Problem: Prexisting or High Potential for Compromised Skin Integrity  Goal: Skin integrity is maintained or improved  Description: INTERVENTIONS:  - Identify patients at risk for skin breakdown  - Assess and monitor skin integrity  - Assess and monitor nutrition and hydration status  - Monitor labs   - Assess for incontinence   - Turn and reposition patient  - Assist with mobility/ambulation  - Relieve pressure over bony prominences  - Avoid friction and shearing  - Provide appropriate hygiene as needed including keeping skin clean and dry  - Evaluate need for skin moisturizer/barrier cream  - Collaborate with interdisciplinary team   - Patient/family teaching  - Consider wound care consult   Outcome: Progressing     Problem: Potential for Falls  Goal: Patient will remain free of falls  Description: INTERVENTIONS:  - Educate patient/family on patient safety including physical limitations  - Instruct patient to call for assistance with activity   - Consult OT/PT to assist with strengthening/mobility   - Keep Call bell within reach  - Keep bed low and locked with side rails adjusted as appropriate  - Keep care items and personal belongings within reach  - Initiate and maintain comfort rounds  - Make Fall Risk Sign visible to staff  - Offer Toileting every 2 Hours, in advance of need  - Initiate/Maintain bed alarm  - Obtain necessary fall risk management equipment: alarm, socks  - Apply yellow socks and bracelet for high fall risk patients  - Consider moving patient to room near nurses station  Outcome: Progressing
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
## 2023-06-14 NOTE — CASE MANAGEMENT
Case Management Progress Note    Patient name Yvonne Chisholm  Location /-67 MRN [id number]  : 1950 Date 2023       LOS (days): 1  Geometric Mean LOS (GMLOS) (days): 5 10  Days to GMLOS:3 9        OBJECTIVE:        Current admission status: Inpatient  Preferred Pharmacy:   [address] [facility]  [address] [facility] [place]  [address] [facility] [address]  Phone: [phone] Fax: [id number] Dimas Clancy, [address] [place] [id number]  Phone: [phone] Fax: [phone]    Primary Care Provider: Francois Webster DO    Primary Insurance: MEDICARE  Secondary Insurance: AARP    PROGRESS NOTE:    Notification made to OP CM Handoff: [facility] OP CM regarding discharge planning and disposition  Message left for Hayden Santiago CM at PCP office to inform of pt's dc to home  with pt's son and daughter in-law

## 2023-06-16 ENCOUNTER — TELEPHONE (OUTPATIENT)
Age: 73
End: 2023-06-16

## 2023-06-16 NOTE — TELEPHONE ENCOUNTER
[facility]  [address] [facility], [address] [place] [address], [address] [place] [address]    [phone]    Telephone Intake: Geriatric Assessment     - Chart Review  1  Has this patient been seen by our department in the last 3 years? No  2  Please route to provider for chart review prior to scheduling and let the caller know that this phone intake will be reviewed IF -  • Pt was recently hospitalized  • Pt is prescribed medications for behavior management or has a history of psychiatric hospitalization  • Pt plans to attend alone    Referral source: Servando Hampton who is scheduling/relationship to pt: Shey Mills, daughter in law  Caller's phone number: [phone]    Reason for referral: Patient concerns  and Family member concerns regarding memory concerns and behavior changes/concerns  If there are behavioral concerns, is the pt prescribed medications to manage these? No   If so, how many? none   Has the patient ever had an inpatient psychiatric hospitalization? No,     What is the goal of the visit? initial Assessment & diagnosis      Has the patient been seen by a Neurologist or Geriatrician? No   If yes, is this appointment for a second opinion? No  Has the patient ever been diagnosed with dementia? No  Has the patient had an MRI NeuroQuant within the last 1 year? No (If so, please route to provider to determine if assessment + conference are needed or if only assessment should be scheduled)      Preferred language? English   Highest education level? High school   Does the patient wear glasses? Yes   Does the patient use hearing aids? No     Is there a living will/healthcare POA in place/If so, who? Yes , Jon Chacko     Does the pt/caregiver have access for a virtual visit (computer/smart phone with audio/video)? Yes     Caller was informed:   • Please make sure the pt is accompanied by someone who knows them well / caregiver / family member to participate in this appointment  Who will accompany the pt (name and relationship)? Arsh Mack, daughter in law   Phone number of person accompanying pt: [phone]  • Please make sure the pt attends all appointments, including the assessment, care conference, follow-up, whether in-person or virtual   • For virtual visits, pt must be physically present in [facility]  Office packet mailed out to: [facility] [address] [facility], [address] [facility]  Added to wait list for sooner appointments/notified that calls can be short notice (same day/day prior)?  No

## 2023-06-20 ENCOUNTER — APPOINTMENT (EMERGENCY)
Dept: RADIOLOGY | Facility: HOSPITAL | Age: 73
DRG: 884 | End: 2023-06-20
Attending: EMERGENCY MEDICINE
Payer: MEDICARE

## 2023-06-20 ENCOUNTER — HOSPITAL ENCOUNTER (INPATIENT)
Facility: HOSPITAL | Age: 73
LOS: 10 days | Discharge: NURSING, REHAB OR LONG-TERM CARE | DRG: 884 | End: 2023-06-30
Attending: EMERGENCY MEDICINE | Admitting: HOSPITALIST
Payer: MEDICARE

## 2023-06-20 ENCOUNTER — APPOINTMENT (EMERGENCY)
Dept: CT IMAGING | Facility: HOSPITAL | Age: 73
DRG: 884 | End: 2023-06-20
Attending: EMERGENCY MEDICINE
Payer: MEDICARE

## 2023-06-20 DIAGNOSIS — E03.9 HYPOTHYROIDISM, UNSPECIFIED TYPE: ICD-10-CM

## 2023-06-20 DIAGNOSIS — F02.83: ICD-10-CM

## 2023-06-20 DIAGNOSIS — R45.1 AGITATION: ICD-10-CM

## 2023-06-20 DIAGNOSIS — E53.8 B12 DEFICIENCY: ICD-10-CM

## 2023-06-20 DIAGNOSIS — R41.81 AGE-RELATED COGNITIVE DECLINE: ICD-10-CM

## 2023-06-20 DIAGNOSIS — R41.82 CHANGE IN MENTAL STATUS: Primary | ICD-10-CM

## 2023-06-20 DIAGNOSIS — R46.89 ABNORMAL BEHAVIOR: ICD-10-CM

## 2023-06-20 LAB
2HR DELTA HS TROPONIN: -1 NG/L
4HR DELTA HS TROPONIN: 0 NG/L
ALBUMIN SERPL BCP-MCNC: 4.2 G/DL (ref 3.5–5)
ALP SERPL-CCNC: 48 U/L (ref 34–104)
ALT SERPL W P-5'-P-CCNC: 30 U/L (ref 7–52)
ANION GAP SERPL CALCULATED.3IONS-SCNC: 5 MMOL/L (ref 4–13)
AST SERPL W P-5'-P-CCNC: 36 U/L (ref 13–39)
BASOPHILS # BLD AUTO: 0.04 THOUSANDS/ÂΜL (ref 0–0.1)
BASOPHILS NFR BLD AUTO: 1 % (ref 0–1)
BILIRUB SERPL-MCNC: 1.07 MG/DL (ref 0.2–1)
BILIRUB UR QL STRIP: NEGATIVE
BUN SERPL-MCNC: 11 MG/DL (ref 5–25)
CALCIUM SERPL-MCNC: 8.9 MG/DL (ref 8.4–10.2)
CARDIAC TROPONIN I PNL SERPL HS: 5 NG/L
CARDIAC TROPONIN I PNL SERPL HS: 6 NG/L
CARDIAC TROPONIN I PNL SERPL HS: 6 NG/L
CHLORIDE SERPL-SCNC: 108 MMOL/L (ref 96–108)
CLARITY UR: CLEAR
CO2 SERPL-SCNC: 28 MMOL/L (ref 21–32)
COLOR UR: YELLOW
CREAT SERPL-MCNC: 1.19 MG/DL (ref 0.6–1.3)
EOSINOPHIL # BLD AUTO: 0.19 THOUSAND/ÂΜL (ref 0–0.61)
EOSINOPHIL NFR BLD AUTO: 4 % (ref 0–6)
ERYTHROCYTE [DISTWIDTH] IN BLOOD BY AUTOMATED COUNT: 15.1 % (ref 11.6–15.1)
GFR SERPL CREATININE-BSD FRML MDRD: 59 ML/MIN/1.73SQ M
GLUCOSE SERPL-MCNC: 98 MG/DL (ref 65–140)
GLUCOSE UR STRIP-MCNC: NEGATIVE MG/DL
HCT VFR BLD AUTO: 41.6 % (ref 36.5–49.3)
HGB BLD-MCNC: 13.5 G/DL (ref 12–17)
HGB UR QL STRIP.AUTO: NEGATIVE
IMM GRANULOCYTES # BLD AUTO: 0.02 THOUSAND/UL (ref 0–0.2)
IMM GRANULOCYTES NFR BLD AUTO: 0 % (ref 0–2)
KETONES UR STRIP-MCNC: NEGATIVE MG/DL
LEUKOCYTE ESTERASE UR QL STRIP: NEGATIVE
LYMPHOCYTES # BLD AUTO: 1.41 THOUSANDS/ÂΜL (ref 0.6–4.47)
LYMPHOCYTES NFR BLD AUTO: 29 % (ref 14–44)
MCH RBC QN AUTO: 32.8 PG (ref 26.8–34.3)
MCHC RBC AUTO-ENTMCNC: 32.5 G/DL (ref 31.4–37.4)
MCV RBC AUTO: 101 FL (ref 82–98)
MONOCYTES # BLD AUTO: 0.4 THOUSAND/ÂΜL (ref 0.17–1.22)
MONOCYTES NFR BLD AUTO: 8 % (ref 4–12)
NEUTROPHILS # BLD AUTO: 2.77 THOUSANDS/ÂΜL (ref 1.85–7.62)
NEUTS SEG NFR BLD AUTO: 58 % (ref 43–75)
NITRITE UR QL STRIP: NEGATIVE
NRBC BLD AUTO-RTO: 0 /100 WBCS
PH UR STRIP.AUTO: 7.5 [PH]
PLATELET # BLD AUTO: 155 THOUSANDS/UL (ref 149–390)
PMV BLD AUTO: 10.9 FL (ref 8.9–12.7)
POTASSIUM SERPL-SCNC: 4 MMOL/L (ref 3.5–5.3)
PROT SERPL-MCNC: 7 G/DL (ref 6.4–8.4)
PROT UR STRIP-MCNC: NEGATIVE MG/DL
RBC # BLD AUTO: 4.12 MILLION/UL (ref 3.88–5.62)
SODIUM SERPL-SCNC: 141 MMOL/L (ref 135–147)
SP GR UR STRIP.AUTO: 1.01 (ref 1–1.03)
UROBILINOGEN UR STRIP-ACNC: 2 MG/DL
WBC # BLD AUTO: 4.83 THOUSAND/UL (ref 4.31–10.16)

## 2023-06-20 PROCEDURE — 73030 X-RAY EXAM OF SHOULDER: CPT

## 2023-06-20 PROCEDURE — G1004 CDSM NDSC: HCPCS

## 2023-06-20 PROCEDURE — 36415 COLL VENOUS BLD VENIPUNCTURE: CPT

## 2023-06-20 PROCEDURE — 93005 ELECTROCARDIOGRAM TRACING: CPT

## 2023-06-20 PROCEDURE — 80053 COMPREHEN METABOLIC PANEL: CPT | Performed by: EMERGENCY MEDICINE

## 2023-06-20 PROCEDURE — 99285 EMERGENCY DEPT VISIT HI MDM: CPT

## 2023-06-20 PROCEDURE — 1123F ACP DISCUSS/DSCN MKR DOCD: CPT | Performed by: INTERNAL MEDICINE

## 2023-06-20 PROCEDURE — 84484 ASSAY OF TROPONIN QUANT: CPT | Performed by: EMERGENCY MEDICINE

## 2023-06-20 PROCEDURE — 85025 COMPLETE CBC W/AUTO DIFF WBC: CPT | Performed by: EMERGENCY MEDICINE

## 2023-06-20 PROCEDURE — 99222 1ST HOSP IP/OBS MODERATE 55: CPT | Performed by: HOSPITALIST

## 2023-06-20 PROCEDURE — 81003 URINALYSIS AUTO W/O SCOPE: CPT | Performed by: EMERGENCY MEDICINE

## 2023-06-20 PROCEDURE — 70450 CT HEAD/BRAIN W/O DYE: CPT

## 2023-06-20 PROCEDURE — 99285 EMERGENCY DEPT VISIT HI MDM: CPT | Performed by: EMERGENCY MEDICINE

## 2023-06-20 RX ORDER — LATANOPROST 50 UG/ML
1 SOLUTION/ DROPS OPHTHALMIC
Status: DISCONTINUED | OUTPATIENT
Start: 2023-06-20 | End: 2023-06-30 | Stop reason: HOSPADM

## 2023-06-20 RX ORDER — DOCUSATE SODIUM 100 MG/1
100 CAPSULE, LIQUID FILLED ORAL 2 TIMES DAILY
Status: DISCONTINUED | OUTPATIENT
Start: 2023-06-20 | End: 2023-06-30 | Stop reason: HOSPADM

## 2023-06-20 RX ORDER — ENOXAPARIN SODIUM 100 MG/ML
40 INJECTION SUBCUTANEOUS DAILY
Status: DISCONTINUED | OUTPATIENT
Start: 2023-06-20 | End: 2023-06-30 | Stop reason: HOSPADM

## 2023-06-20 RX ORDER — ONDANSETRON 2 MG/ML
4 INJECTION INTRAMUSCULAR; INTRAVENOUS EVERY 6 HOURS PRN
Status: DISCONTINUED | OUTPATIENT
Start: 2023-06-20 | End: 2023-06-30 | Stop reason: HOSPADM

## 2023-06-20 RX ORDER — FLUTICASONE PROPIONATE 50 MCG
1 SPRAY, SUSPENSION (ML) NASAL DAILY
Status: DISCONTINUED | OUTPATIENT
Start: 2023-06-20 | End: 2023-06-30 | Stop reason: HOSPADM

## 2023-06-20 RX ORDER — ACETAMINOPHEN 325 MG/1
650 TABLET ORAL EVERY 6 HOURS PRN
Status: DISCONTINUED | OUTPATIENT
Start: 2023-06-20 | End: 2023-06-30 | Stop reason: HOSPADM

## 2023-06-20 RX ORDER — FERROUS SULFATE 325(65) MG
325 TABLET ORAL
Status: DISCONTINUED | OUTPATIENT
Start: 2023-06-21 | End: 2023-06-30 | Stop reason: HOSPADM

## 2023-06-20 RX ORDER — SENNOSIDES 8.6 MG
1 TABLET ORAL DAILY
Status: DISCONTINUED | OUTPATIENT
Start: 2023-06-20 | End: 2023-06-30 | Stop reason: HOSPADM

## 2023-06-20 RX ORDER — PRAVASTATIN SODIUM 40 MG
40 TABLET ORAL
Status: DISCONTINUED | OUTPATIENT
Start: 2023-06-20 | End: 2023-06-30 | Stop reason: HOSPADM

## 2023-06-20 RX ORDER — CALCIUM CARBONATE 500 MG/1
1000 TABLET, CHEWABLE ORAL DAILY PRN
Status: DISCONTINUED | OUTPATIENT
Start: 2023-06-20 | End: 2023-06-30 | Stop reason: HOSPADM

## 2023-06-20 RX ORDER — QUETIAPINE FUMARATE 25 MG/1
12.5 TABLET, FILM COATED ORAL
Status: DISCONTINUED | OUTPATIENT
Start: 2023-06-20 | End: 2023-06-22

## 2023-06-20 RX ADMIN — ENOXAPARIN SODIUM 40 MG: 100 INJECTION SUBCUTANEOUS at 17:58

## 2023-06-20 RX ADMIN — DOCUSATE SODIUM 100 MG: 100 CAPSULE, LIQUID FILLED ORAL at 17:33

## 2023-06-20 RX ADMIN — LATANOPROST 1 DROP: 50 SOLUTION OPHTHALMIC at 20:51

## 2023-06-20 RX ADMIN — PRAVASTATIN SODIUM 40 MG: 40 TABLET ORAL at 17:57

## 2023-06-20 RX ADMIN — QUETIAPINE FUMARATE 12.5 MG: 25 TABLET ORAL at 20:00

## 2023-06-20 NOTE — ED PROVIDER NOTES
History  Chief Complaint   Patient presents with   • Failure To Thrive     73 yo M with PMH of HTN, HLD, hypothyroid presents to ED with increased confusion and bizarre behavior per his daughter in law, whom he has been living with since his recent d/c  Urinating outside of the toilet, saying strange things  No fall or trauma  No fevers  Was complaining of left shoulder pain  He states that is ongoing for 4 months or so  No known injury or trauma  No CP/SOB  No abd pain  No urinary sx  No fevers  Family has made sure he has been getting his meds appropriately  Eating/drinking fine  Pt has no complaints and is calm and cooperative at this time  No etoh use  No focal weakness, but daughter in law did note slurred speech earlier  No facial droop  Recent admission, d/c   Was admitted for encephalopathy  Was advised to seek long term care, not safe to live by himself any longer  History provided by:  Patient and medical records   used: No    Altered Mental Status  Presenting symptoms: behavior changes and confusion    Severity:  Moderate  Most recent episode:  More than 2 days ago  Episode history:  Continuous  Timing:  Intermittent  Progression:  Waxing and waning  Chronicity:  New  Context: not alcohol use and not head injury    Associated symptoms: slurred speech    Associated symptoms: no abdominal pain, no difficulty breathing, no fever, no headaches, no light-headedness, no nausea, no palpitations, no rash, no seizures, no vomiting and no weakness        Prior to Admission Medications   Prescriptions Last Dose Informant Patient Reported? Taking?    Cyanocobalamin 1000 MCG SUBL   Yes No   Sig: every 24 hours   ferrous sulfate 325 (65 Fe) mg tablet   No No   Sig: Take 1 tablet (325 mg total) by mouth daily with breakfast   fluticasone (FLONASE) 50 mcg/act nasal spray   No No   Si spray into each nostril daily   latanoprost (XALATAN) 0 005 % ophthalmic solution   Yes No   Sig: place 1 drop into both eyes at bedtime   levothyroxine 125 mcg tablet   No No   Sig: Take 1 tablet (125 mcg total) by mouth every morning Take on an empty stomach   lovastatin (MEVACOR) 40 MG tablet   No No   Sig: Take 2 tablets (80 mg total) by mouth every 24 hours      Facility-Administered Medications: None       Past Medical History:   Diagnosis Date   • Disease of thyroid gland    • Hyperlipidemia    • Hypertension        Past Surgical History:   Procedure Laterality Date   • HERNIA REPAIR     • REPLACEMENT TOTAL KNEE BILATERAL         Family History   Family history unknown: Yes     I have reviewed and agree with the history as documented  E-Cigarette/Vaping   • E-Cigarette Use Never User      E-Cigarette/Vaping Substances     Social History     Tobacco Use   • Smoking status: Former     Types: Cigarettes     Quit date: 10/23/2009     Years since quittin 6   • Smokeless tobacco: Never   Vaping Use   • Vaping Use: Never used   Substance Use Topics   • Alcohol use: Not Currently     Comment: no drinks in at least 6 months   • Drug use: Never       Review of Systems   Constitutional: Negative for chills, diaphoresis, fatigue, fever and unexpected weight change  HENT: Negative for congestion, ear pain, rhinorrhea, sore throat, trouble swallowing and voice change  Eyes: Negative for pain and visual disturbance  Respiratory: Negative for cough, chest tightness and shortness of breath  Cardiovascular: Negative for chest pain, palpitations and leg swelling  Gastrointestinal: Negative for abdominal pain, blood in stool, constipation, diarrhea, nausea and vomiting  Genitourinary: Negative for difficulty urinating and hematuria  Musculoskeletal: Negative for arthralgias, back pain and neck pain  Skin: Negative for rash  Neurological: Negative for dizziness, seizures, syncope, weakness, light-headedness and headaches  Psychiatric/Behavioral: Positive for behavioral problems and confusion  Negative for suicidal ideas  The patient is not nervous/anxious  Physical Exam  Physical Exam  Vitals and nursing note reviewed  Constitutional:       General: He is not in acute distress  Appearance: He is well-developed  He is not ill-appearing, toxic-appearing or diaphoretic  HENT:      Head: Normocephalic and atraumatic  Right Ear: External ear normal       Left Ear: External ear normal       Nose: Nose normal       Mouth/Throat:      Mouth: Mucous membranes are moist       Pharynx: Oropharynx is clear  Eyes:      General: No visual field deficit or scleral icterus  Right eye: No discharge  Left eye: No discharge  Extraocular Movements: Extraocular movements intact  Conjunctiva/sclera: Conjunctivae normal       Pupils: Pupils are equal, round, and reactive to light  Neck:      Vascular: No JVD  Trachea: No tracheal deviation  Cardiovascular:      Rate and Rhythm: Normal rate and regular rhythm  Pulses: Normal pulses  Heart sounds: Normal heart sounds  No murmur heard  No friction rub  No gallop  Pulmonary:      Effort: Pulmonary effort is normal  No respiratory distress  Breath sounds: Normal breath sounds  No stridor  No wheezing or rales  Chest:      Chest wall: No tenderness  Abdominal:      General: Bowel sounds are normal  There is no distension  Palpations: Abdomen is soft  Tenderness: There is no abdominal tenderness  There is no guarding or rebound  Musculoskeletal:         General: No deformity  Normal range of motion  Left shoulder: Tenderness (mild TTP over left anterior shoulder w/out bruising or deformity  FROM  5/5 strength  soft compartments ) present  No swelling, deformity, effusion, laceration or crepitus  Normal range of motion  Normal strength  Normal pulse  Cervical back: Normal range of motion and neck supple  No rigidity or tenderness     Lymphadenopathy:      Cervical: No cervical adenopathy  Skin:     General: Skin is warm and dry  Findings: No rash  Neurological:      General: No focal deficit present  Mental Status: He is alert and oriented to person, place, and time  GCS: GCS eye subscore is 4  GCS verbal subscore is 5  GCS motor subscore is 6  Cranial Nerves: Cranial nerves 2-12 are intact  No cranial nerve deficit, dysarthria or facial asymmetry  Sensory: Sensation is intact  No sensory deficit  Motor: Motor function is intact  No weakness, tremor, abnormal muscle tone or pronator drift        Coordination: Coordination normal  Finger-Nose-Finger Test normal    Psychiatric:         Behavior: Behavior normal          Vital Signs  ED Triage Vitals [06/20/23 0246]   Temperature Pulse Respirations Blood Pressure SpO2   98 5 °F (36 9 °C) 62 18 (!) 154/102 96 %      Temp Source Heart Rate Source Patient Position - Orthostatic VS BP Location FiO2 (%)   Oral Monitor Sitting Right arm --      Pain Score       No Pain           Vitals:    06/20/23 0246 06/20/23 0517   BP: (!) 154/102 142/67   Pulse: 62 60   Patient Position - Orthostatic VS: Sitting Lying         Visual Acuity      ED Medications  Medications - No data to display    Diagnostic Studies  Results Reviewed     Procedure Component Value Units Date/Time    HS Troponin 0hr (reflex protocol) [[id number]]  (Normal) Collected: 06/20/23 0455    Lab Status: Final result Specimen: Blood Updated: 06/20/23 0516     hs TnI 0hr 6 ng/L     HS Troponin I 2hr [[id number]]     Lab Status: No result Specimen: Blood     UA w Reflex to Microscopic w Reflex to Culture [[id number]]  (Abnormal) Collected: 06/20/23 0417    Lab Status: Final result Specimen: Urine, Clean Catch Updated: 06/20/23 0424     Color, UA Yellow     Clarity, UA Clear     Specific Gravity, UA 1 015     pH, UA 7 5     Leukocytes, UA Negative     Nitrite, UA Negative     Protein, UA Negative mg/dl      Glucose, UA Negative mg/dl      Ketones, UA Negative mg/dl      Urobilinogen, UA 2 0 mg/dl      Bilirubin, UA Negative     Occult Blood, UA Negative    Comprehensive metabolic panel [[id number]]  (Abnormal) Collected: 06/20/23 0259    Lab Status: Final result Specimen: Blood from Arm, Right Updated: 06/20/23 0318     Sodium 141 mmol/L      Potassium 4 0 mmol/L      Chloride 108 mmol/L      CO2 28 mmol/L      ANION GAP 5 mmol/L      BUN 11 mg/dL      Creatinine 1 19 mg/dL      Glucose 98 mg/dL      Calcium 8 9 mg/dL      AST 36 U/L      ALT 30 U/L      Alkaline Phosphatase 48 U/L      Total Protein 7 0 g/dL      Albumin 4 2 g/dL      Total Bilirubin 1 07 mg/dL      eGFR 59 ml/min/1 73sq m     Narrative:      [place] guidelines for Chronic Kidney Disease (CKD):   •  Stage 1 with normal or high GFR (GFR > 90 mL/min/1 73 square meters)  •  Stage 2 Mild CKD (GFR = 60-89 mL/min/1 73 square meters)  •  Stage 3A Moderate CKD (GFR = 45-59 mL/min/1 73 square meters)  •  Stage 3B Moderate CKD (GFR = 30-44 mL/min/1 73 square meters)  •  Stage 4 Severe CKD (GFR = 15-29 mL/min/1 73 square meters)  •  Stage 5 End Stage CKD (GFR <15 mL/min/1 73 square meters)  Note: GFR calculation is accurate only with a steady state creatinine    CBC and differential [[id number]]  (Abnormal) Collected: 06/20/23 0259    Lab Status: Final result Specimen: Blood from Arm, Right Updated: 06/20/23 0304     WBC 4 83 Thousand/uL      RBC 4 12 Million/uL      Hemoglobin 13 5 g/dL      Hematocrit 41 6 %       fL      MCH 32 8 pg      MCHC 32 5 g/dL      RDW 15 1 %      MPV 10 9 fL      Platelets 004 Thousands/uL      nRBC 0 /100 WBCs      Neutrophils Relative 58 %      Immat GRANS % 0 %      Lymphocytes Relative 29 %      Monocytes Relative 8 %      Eosinophils Relative 4 %      Basophils Relative 1 %      Neutrophils Absolute 2 77 Thousands/µL      Immature Grans Absolute 0 02 Thousand/uL      Lymphocytes Absolute 1 41 Thousands/µL      Monocytes Absolute 0 40 Thousand/µL Eosinophils Absolute 0 19 Thousand/µL      Basophils Absolute 0 04 Thousands/µL                  XR shoulder 2+ vw right   ED Interpretation by Ann-Marie Murillo MD (06/20 5400)   No acute abnormality      CT head without contrast   Final Result by Maria Deluca DO (06/20 1735)      No acute intracranial abnormality is seen  Other findings as above  Workstation performed: [id number]                    Procedures  ECG 12 Lead Documentation Only    Date/Time: 6/20/2023 5:43 AM    Performed by: Ann-Marie Murillo MD  Authorized by: Ann-Marie Murillo MD    Indications / Diagnosis:  Ams  ECG reviewed by me, the ED Provider: yes    Patient location:  ED  Previous ECG:     Previous ECG:  Compared to current    Similarity:  No change    Comparison to cardiac monitor: Yes    Interpretation:     Interpretation: non-specific    Rate:     ECG rate:  55    ECG rate assessment: bradycardic    Rhythm:     Rhythm: sinus bradycardia and A-V block    Ectopy:     Ectopy: none    QRS:     QRS axis:  Normal    QRS intervals:  Normal  Conduction:     Conduction: normal    ST segments:     ST segments:  Normal  T waves:     T waves: non-specific               ED Course  ED Course as of 06/20/23 0614   Tue Jun 20, 2023   0544 Family unable to care for patient, state his confusion and behavioral changes are dangerous for him  They haven't had any success with NH placement  Will check CTH and admit  Pt in no distress, no focal deficits on exam  Cooperative  9942 CTH neg  Will admit for further care and potentially placement  Medical Decision Making  Abnormal behavior: acute illness or injury  Change in mental status: acute illness or injury  Amount and/or Complexity of Data Reviewed  Independent Historian: guardian  Labs: ordered  Decision-making details documented in ED Course  Radiology: ordered and independent interpretation performed  Decision-making details documented in ED Course  ECG/medicine tests: ordered and independent interpretation performed  Decision-making details documented in ED Course  Risk  Decision regarding hospitalization  Disposition  Final diagnoses:   Change in mental status   Abnormal behavior     Time reflects when diagnosis was documented in both MDM as applicable and the Disposition within this note     Time User Action Codes Description Comment    6/20/2023  5:42 AM Migdalia Lango Add [R41 82] Change in mental status     6/20/2023  5:43 AM Migdalia Lango Add [R46 89] Abnormal behavior       ED Disposition     ED Disposition   Admit    Condition   Stable    Date/Time   Tue Jun 20, 2023  5:44 AM    Comment   Case was discussed with Randal Perry and the patient's admission status was agreed to be Admission Status: inpatient status to the service of Dr Maday Jimenze   Follow-up Information    None         Patient's Medications   Discharge Prescriptions    No medications on file       No discharge procedures on file      PDMP Review     None          ED Provider  Electronically Signed by           Genevieve Baig MD  06/20/23 8464

## 2023-06-20 NOTE — CASE MANAGEMENT
Case Management Progress Note    Patient name Yair Saini  Location ED /ED 06 MRN [id number]  : 1950 Date 2023       LOS (days): 0  Geometric Mean LOS (GMLOS) (days): 4 70  Days to GMLOS:4 5        OBJECTIVE:        Current admission status: Inpatient  Preferred Pharmacy:   [facility], [address] [facility]  [address] [facility]  [address]  Phone: [phone] Fax: [address] [facility] [place] [id number] [facility]  [address]  [address] [facility] [address]  Phone: [phone] Fax: [phone]    Primary Care Provider: Ana Mireles DO    Primary Insurance: MEDICARE  Secondary Insurance: AARP    PROGRESS NOTE: Cm met with pts BERNARDA Villatoro  She signed consents for option process  Option paperwork to be fax to [facility] today

## 2023-06-20 NOTE — ED NOTES
Provider made aware that per daughter in law, pt has not slept since yesterday, was brought in around 2am and has not slept since then and may need something to help him sleep dixon Laguna RN  06/20/23 8614

## 2023-06-20 NOTE — ED NOTES
Pt up and out of room, pt asking to go home, updated on plan of care  Pt verbalized understanding   Placed back in bed and tv turned on for distraction      Navjot Pablo RN  06/20/23 2633

## 2023-06-20 NOTE — H&P
[place]  H&P  Name: Linda Rob 74 y o  male I MRN: [id number]  Unit/Bed#: ED 06 I Date of Admission: 2023   Date of Service: 2023 I Hospital Day: 0      Assessment/Plan   Memory loss  Assessment & Plan  Patient returns with similar issues as prior admission with disruptive behaviors and family unable to care for patient anymore  Patient noted to have during that admission unremarkable work-up, suspected progressive dementia with low MoCA score  MRI at that time demonstrated old stroke but no acute findings  Case management involved for placement    History of B12 deficiency  Assessment & Plan  Cont b12    HLD (hyperlipidemia)  Assessment & Plan  statin    Hypothyroidism  Assessment & Plan  Continue with Synthroid replacement therapy           Chief Complaint   Patient presents with   • Failure To Thrive        HPI:  Linda Rob is a 74 y o  male who presents with    Inability for family to care for the patient at home  Patient with disruptive behaviors such as urinating outside the toilet, mumbling nonsense  Patient without chest pain or shortness of breath  Denies abdominal pain  States he is eating well  Patient is being admitted for placement      Historical Information   Past Medical History:   Diagnosis Date   • Disease of thyroid gland    • Hyperlipidemia    • Hypertension      Past Surgical History:   Procedure Laterality Date   • HERNIA REPAIR     • REPLACEMENT TOTAL KNEE BILATERAL       Social History   Social History     Substance and Sexual Activity   Alcohol Use Not Currently    Comment: no drinks in at least 6 months     Social History     Substance and Sexual Activity   Drug Use Never     Social History     Tobacco Use   Smoking Status Former   • Types: Cigarettes   • Quit date: 10/23/2009   • Years since quittin 6   Smokeless Tobacco Never     Family History   Family history unknown: Yes       Meds/Allergies   No Known "Allergies    Meds:  No current facility-administered medications for this encounter  Current Outpatient Medications:   •  Cyanocobalamin 1000 MCG SUBL, every 24 hours, Disp: , Rfl:   •  ferrous sulfate 325 (65 Fe) mg tablet, Take 1 tablet (325 mg total) by mouth daily with breakfast, Disp: , Rfl: 0  •  fluticasone (FLONASE) 50 mcg/act nasal spray, 1 spray into each nostril daily, Disp: 16 g, Rfl: 0  •  latanoprost (XALATAN) 0 005 % ophthalmic solution, place 1 drop into both eyes at bedtime, Disp: , Rfl:   •  levothyroxine 125 mcg tablet, Take 1 tablet (125 mcg total) by mouth every morning Take on an empty stomach, Disp: , Rfl:   •  lovastatin (MEVACOR) 40 MG tablet, Take 2 tablets (80 mg total) by mouth every 24 hours, Disp: , Rfl: 0    (Not in a hospital admission)        Review of Systems:    A complete and comprehensive 14 point organ system review was performed and all other systems are negative other than stated above in the HPI    Current Vitals:   Blood Pressure: 147/92 (06/20/23 0800)  Pulse: 59 (06/20/23 0800)  Temperature: 98 5 °F (36 9 °C) (06/20/23 0246)  Temp Source: Oral (06/20/23 0246)  Respirations: 16 (06/20/23 0800)  Height: 5' 9\" (175 3 cm) (06/20/23 0246)  SpO2: 95 % (06/20/23 0800)  SPO2 RA Rest    Flowsheet Row ED from 6/20/2023 in  [address] [facility]   SpO2 95 %   SpO2 Activity At Rest   O2 Device None (Room air)   O2 Flow Rate --          Intake/Output Summary (Last 24 hours) at 6/20/2023 1255  Last data filed at 6/20/2023 0851  Gross per 24 hour   Intake --   Output 200 ml   Net -200 ml     Body mass index is 28 62 kg/m²       Physical Exam:       General: well appearing, no acute distress  HEENT: atraumatic, PERRLA, moist mucosa, normal pharynx, normal tonsils and adenoids, normal tongue, no fluid in sinuses  Neck: Trachea midline, no carotid bruit, no masses  Respiratory: normal chest wall expansion, CTA B, no r/r/w, no rubs  Cardiovascular: RRR, no " "m/r/g, Normal S1 and S2  Abdomen: Soft, non-tender, non-distended, normal bowel sounds in all quadrants, no hepatosplenomegaly, no tympany  Rectal: deferred  Musculoskeletal: normal ROM in upper and lower extremities  Integumentary: warm, dry, and pink, with no rash, purpura, or petechia  Heme/Lymph: no lymphadenopathy, no bruises  Neurological: Cranial Nerves II-XII grossly intact; no focal deficits in sensation or strength, A  x O x 1  Psychiatric: cooperative with normal mood, affect,  Apparent dementia  Lab Results:   CBC:   Lab Results   Component Value Date    WBC 4 83 06/20/2023    HGB 13 5 06/20/2023    HCT 41 6 06/20/2023     (H) 06/20/2023     06/20/2023    RBC 4 12 06/20/2023    MCH 32 8 06/20/2023    MCHC 32 5 06/20/2023    RDW 15 1 06/20/2023    MPV 10 9 06/20/2023    NRBC 0 06/20/2023     CMP:  Lab Results   Component Value Date     06/20/2023    CO2 28 06/20/2023    BUN 11 06/20/2023    CREATININE 1 19 06/20/2023    CALCIUM 8 9 06/20/2023    AST 36 06/20/2023    ALT 30 06/20/2023    ALKPHOS 48 06/20/2023    EGFR 59 06/20/2023     Lab Results   Component Value Date    TROPONINI <0 02 10/23/2020     Coagulation:   Lab Results   Component Value Date    INR 1 00 06/10/2023    Urinalysis:  Lab Results   Component Value Date    COLORU Yellow 06/20/2023    CLARITYU Clear 06/20/2023    SPECGRAV 1 015 06/20/2023    PHUR 7 5 06/20/2023    LEUKOCYTESUR Negative 06/20/2023    NITRITE Negative 06/20/2023    GLUCOSEU Negative 06/20/2023    KETONESU Negative 06/20/2023    BILIRUBINUR Negative 06/20/2023    BLOODU Negative 06/20/2023      Amylase: No results found for: \"AMYLASE\"  Lipase: No results found for: \"LIPASE\"     Imaging: XR shoulder 2+ vw right    Result Date: 6/20/2023  Narrative: RIGHT SHOULDER INDICATION:   pain  COMPARISON:  None VIEWS:  XR SHOULDER 2+ VW RIGHT FINDINGS: There is no acute fracture or dislocation   Moderate osteoarthritis of the glenohumeral and acromioclavicular " joints  No lytic or blastic osseous lesion  Soft tissues are unremarkable  Impression: No acute osseous abnormality  Workstation performed: [id number]     CT head without contrast    Result Date: 6/20/2023  Narrative: CT BRAIN - WITHOUT CONTRAST INDICATION:   Delirium ams  COMPARISON: MR brain dated 6/12/2023, CT head dated 6/10/2023 TECHNIQUE:  CT examination of the brain was performed  Multiplanar 2D reformatted images were created from the source data  Radiation dose length product (DLP) for this visit:  879 55 mGy-cm   This examination, like all CT scans performed in the [facility], was performed utilizing techniques to minimize radiation dose exposure, including the use of iterative  reconstruction and automated exposure control  IMAGE QUALITY:  Diagnostic  FINDINGS: PARENCHYMA: Decreased attenuation is noted in periventricular and subcortical white matter demonstrating an appearance that is statistically most likely to represent advanced microangiopathic change  Gray-white differentiation appears maintained  No CT signs of acute territorial infarction  No intracranial mass, mass effect or midline shift  No acute parenchymal hemorrhage  Parenchymal atrophy  VENTRICLES AND EXTRA-AXIAL SPACES:  Ventricles and extra-axial CSF spaces are prominent commensurate with the degree of volume loss  No hydrocephalus  No acute extra-axial hemorrhage  VISUALIZED ORBITS: Normal visualized orbits  PARANASAL SINUSES: Mild coastal thickening in the ethmoid sinuses  Visualized paranasal sinuses otherwise appear grossly clear  CALVARIUM AND EXTRACRANIAL SOFT TISSUES:  Normal      Impression: No acute intracranial abnormality is seen  Other findings as above  Workstation performed: [id number]     MRI brain wo contrast    Result Date: 6/12/2023  Narrative: MRI BRAIN WITHOUT CONTRAST INDICATION: brain, cognitive changes, eval stroke, mass or other   COMPARISON:   Brain MRI 3/17/2021 TECHNIQUE:  Multiplanar, multisequence imaging of the brain was performed  IMAGE QUALITY:  Diagnostic  FINDINGS: BRAIN PARENCHYMA: Cerebral atrophy  There is no discrete mass, mass effect or midline shift  There is no intracranial hemorrhage  There is no evidence of acute infarction and diffusion imaging is unremarkable  Grossly stable nonspecific confluent T2/FLAIR hyperintensity involving periventricular and subcortical white matter most consistent with advanced microangiopathy  Stable lacunar infarct in the right temporal lobe (series 4 image 13)  VENTRICLES:  Normal for the patient's age  SELLA AND PITUITARY GLAND:  Normal  ORBITS:  Normal  PARANASAL SINUSES:  Normal  VASCULATURE:  Evaluation of the major intracranial vasculature demonstrates appropriate flow voids  CALVARIUM AND SKULL BASE:  Normal  Minimal bilateral mastoid effusion  EXTRACRANIAL SOFT TISSUES:  Normal      Impression: 1  No acute ischemia  Stable old lacunar infarct in the right temporal lobe  2   Grossly stable nonspecific white matter change suggesting microangiopathy  Workstation performed: [id number]     XR chest 1 view portable    Result Date: 6/10/2023  Narrative: CHEST INDICATION:   Confusion  COMPARISON: CXR 10/23/2020  EXAM PERFORMED/VIEWS:  XR CHEST PORTABLE  FINDINGS: Cardiomediastinal silhouette normal  Moderate hiatal hernia  Lungs clear  No effusion or pneumothorax  Upper abdomen normal  Bones normal for age  Impression: No acute cardiopulmonary disease  Workstation performed: [id number]     CT head without contrast    Result Date: 6/10/2023  Narrative: CT BRAIN - WITHOUT CONTRAST INDICATION:   Confusion  COMPARISON:  None  TECHNIQUE:  CT examination of the brain was performed  Multiplanar 2D reformatted images were created from the source data  Radiation dose length product (DLP) for this visit:  901 74 mGy-cm     This examination, like all CT scans performed in the [facility], was performed utilizing techniques to minimize "radiation dose exposure, including the use of iterative  reconstruction and automated exposure control  IMAGE QUALITY:  Diagnostic  FINDINGS: PARENCHYMA: 10/23/2020 VENTRICLES AND EXTRA-AXIAL SPACES:  Normal for the patient's age  VISUALIZED ORBITS: Normal visualized orbits  PARANASAL SINUSES: Normal visualized paranasal sinuses  CALVARIUM AND EXTRACRANIAL SOFT TISSUES:  Normal      Impression: No acute intracranial abnormality  Workstation performed: [id number]     EKG, Pathology, and Other Studies: I have personally reviewed the results  VTE   Prophylaxis: In place    Code Status: Prior    Anticipated Length of Stay:  Patient will be admitted on an Inpatient basis with an anticipated length of stay of  greater 2 midnights  Counseling / Coordination of Care  Total floor / unit time spent today  40 minutes  Greater than 50% of total time was spent with the patient and / or family counseling and / or coordination of care       \"This note has been constructed using a voice recognition system\"      Jono Marcus MD  6/20/2023, 12:55 PM        "

## 2023-06-20 NOTE — CASE MANAGEMENT
Case Management Assessment & Discharge Planning Note    Patient name Yair Saini  Location ED 06/ED 06 MRN [id number]  : 1950 Date 2023       Current Admission Date: 2023  Current Admission Diagnosis:Memory loss   Patient Active Problem List    Diagnosis Date Noted   • Hypothyroidism 2023   • Benign essential HTN 2023   • HLD (hyperlipidemia) 2023   • TORI on CPAP 2023   • Generalized weakness 2023   • Elevated serum creatinine 2023   • History of B12 deficiency 2023   • Macular degeneration 2023   • Glaucoma 2023   • Memory loss 2023   • Vertigo 2021      LOS (days): 0  Geometric Mean LOS (GMLOS) (days):   Days to GMLOS:     OBJECTIVE:  PATIENT READMITTED TO HOSPITAL  Risk of Unplanned Readmission Score: 8 45         Current admission status: Inpatient  Referral Reason: Option Paperwork, SNF, Initial    Preferred Pharmacy:   [facility], [address] [facility] [address] [facility]  [address] [facility],[address]  [facility] [address]  Phone: [phone] Fax: [address] [facility] [id number] [facility], [address] [address]  Phone: [phone] Fax: [phone]    Primary Care Provider: Ana Mireles DO    Primary Insurance: MEDICARE  Secondary Insurance: AAR    ASSESSMENT:  Regan Garcia - Daughter In-Law   Primary Phone: [phone] (Mobile)               Advance Directives  Does patient have a [address]?: Yes  Does patient have Advance Directives?: Yes  Advance Directives: Living will, Power of  for health care  Primary Contact: Both son Carlos Eduardo Worthington and Dtr in [facility] are POAs         Readmission Root Cause  30 Day Readmission: Yes  Who directed you to return to the hospital?: Family  Did you understand whom to contact if you had questions or problems?: Yes  Did you get your prescriptions before you left the hospital?: No  Reason[de-identified] Preference for own pharmacy  Were you able to get your prescriptions filled when you left the hospital?: Yes  Did you take your medications as prescribed?: Yes  Were you able to get to your follow-up appointments?: Yes  During previous admission, was a post-acute recommendation made?: No  Patient was readmitted due to: Failure to Thrive  Action Plan: Family is requesting Placement  Option paperwork started    Patient Information  Admitted from[de-identified] Home  Mental Status: Confused  During Assessment patient was accompanied by: Not accompanied during assessment  Assessment information provided by[de-identified] Other - please comment (Dtr in law / Ksenia Pan)  Primary Caregiver: Family  Caregiver's Name[de-identified] Liz Case and [place] Relationship to Patient[de-identified] Family Member  Caregiver's Telephone Number[de-identified] [phone]  Support Systems: Family members  [place] of Residence: [address] [place] [address] do you live in?: [address] [place] [address] entry access options   Select all that apply : No steps to enter home  Type of Current Residence: Bi-level  Upon entering residence, is there a bedroom on the main floor (no further steps)?: No  A bedroom is located on the following floor levels of residence (select all that apply):: 2nd Floor  Upon entering residence, is there a bathroom on the main floor (no further steps)?: No  Indicate which floors of current residence have a bathroom (select all the apply):: 2nd Floor  Number of steps to 2nd floor from main floor: 8  In the last 12 months, was there a time when you were not able to pay the mortgage or rent on time?: No  In the last 12 months, how many places have you lived?: 1  In the last 12 months, was there a time when you did not have a steady place to sleep or slept in a shelter (including now)?: No  Homeless/housing insecurity resource given?: N/A  Living Arrangements: Lives w/ Spouse/significant other  Is patient a ?: Yes  Is patient active with [facility])?: No    Activities of Daily Living Prior to Admission  Functional Status: Assistance  Completes ADLs independently?: No  Level of ADL dependence: Assistance  Ambulates independently?: No  Level of ambulatory dependence: Assistance  Does patient use assisted devices?: No  Does patient currently own DME?: No  Does patient have a history of Outpatient Therapy (PT/OT)?: No  Does the patient have a history of Short-Term Rehab?: No  Does patient have a history of HHC?: No  Does patient currently have [place] [address]?: No         Patient Information Continued  Income Source: Employed  Does patient have prescription coverage?: Yes  Within the past 12 months, you worried that your food would run out before you got the money to buy more : Never true  Within the past 12 months, the food you bought just didn't last and you didn't have money to get more : Never true  Food insecurity resource given?: N/A  Does patient receive dialysis treatments?: No  Does patient have a history of substance abuse?: No  Does patient have a history of Mental Health Diagnosis?: No         Means of Transportation  Means of Transport to Appts[de-identified] Family transport  In the past 12 months, has lack of transportation kept you from medical appointments or from getting medications?: No  In the past 12 months, has lack of transportation kept you from meetings, work, or from getting things needed for daily living?: No  Was application for public transport provided?: N/A        DISCHARGE DETAILS:    Discharge planning discussed with[de-identified] Misha Bold of Choice: Yes  Comments - Freedom of Choice: Discussed Option process and assessment information   Family to come in for consent to send option today  CM contacted family/caregiver?: Yes  Were Treatment Team discharge recommendations reviewed with patient/caregiver?: Yes  Did patient/caregiver verbalize understanding of patient care needs?: Yes       Contacts  Patient Contacts: [place]  Relationship to Patient[de-identified] "Family  Contact Method: Phone  Phone Number: [phone]  Reason/Outcome: Continuity of Care, Emergency Contact, Discharge [address]         Is the patient interested in [facility] at discharge?: No    DME Referral Provided  Referral made for DME?: No    Other Referral/Resources/Interventions Provided:  Interventions: SNF  Referral Comments: Seeking LTC for pt in SNF  Option paperwork to be signed by family         Treatment Team Recommendation: SNF  Discharge Destination Plan[de-identified] SNF  [facility] Dis[facility] [address] [facility]                                      Additional Comments: Cm consulted for placement for pt  Pt is a readmission  He was here 6/10-6/13 with similar concerns of memory loss and confusion  Pt currently lives with his ex in a bilevel home with no dhaval  There are 8 stairs to the landing and another 8 to the top floor  Per pts BERNARDA Julio C Man pt is home alone most of the time  She states he has had falls and issue with stairs  She shared that the one stair that is at her home is a challenge for pt when he is there are he will fall without supervison and assistance  Pt does not own any DME  He had a CPAP but the status of the CPAP is unknown at this time  Per BERNARDA pt is a \"shuffler and leans\" when he walks  She states he needs assitnace with all ADLs  He needs queing for for tasks sucha s dressing  He is not able to prepare meals for himself  He can feed himself but he is not able to do anything regarding meal prep  She shared that she ask him to make a sandwich the other day and he got a peice of bread out and then he sat back down  Family is not able to care for pt in their home as they feel it is not safe  Pt is not able to return to his home as he is essentially living alone  Pts DIL feel pt is also haveing issues with swallowing his food  He is no longer driving since his last admissiona his license was revoked by Doctor  Pts POAs are his Son Santos Fontanez and BERNARDA Julio C Man   " Inna Lisa is a CNA  Family has been looking for facilities  They have a tour with [facility] on Thursday  Cm also starting Option process  Pt was to have OP Dementia work up July 10th  Cm waiting for family to sign consents   Neuro Psych consult requested for comp eval

## 2023-06-20 NOTE — ED NOTES
"Pt was whistling, forgot how to use call bell, shown how to use call bell, provided with urinal pt unsteady while standing c/o \"dizziness\" pt assisted back to bed and provided with food   Tv turned on for distraction      Yesenia Javier, SHEFALI  06/20/23 1102    "

## 2023-06-20 NOTE — Clinical Note
Case was discussed with Pavan King and the patient's admission status was agreed to be Admission Status: inpatient status to the service of Dr Harshad Nichols

## 2023-06-20 NOTE — ED NOTES
Pt confused, asking about $1000 and taxi and then talking about poker  Family called will be in around 2pm  Pt ambulated around unit for change and then placed in recliner for comfort       Rashad Wallace RN  06/20/23 6841

## 2023-06-20 NOTE — ASSESSMENT & PLAN NOTE
Patient returns with similar issues as prior admission with disruptive behaviors and family unable to care for patient anymore  Patient noted to have during that admission unremarkable work-up, suspected progressive dementia with low MoCA score  MRI at that time demonstrated old stroke but no acute findings  Case management involved for placement

## 2023-06-20 NOTE — CASE MANAGEMENT
Case Management Progress Note    Patient name Gris Black  Location ED 06/ED 06 MRN [id number]  : 1950 Date 2023       LOS (days): 0  Geometric Mean LOS (GMLOS) (days): 4 70  Days to GMLOS:4 5        OBJECTIVE:        Current admission status: Inpatient  Preferred Pharmacy:   [facility] [address], [address] [facility]  [address] [facility]  [address]  Phone: [phone] Fax: [address] [place] [address] [place] [address] [facility]  [address]  [address] [facility] [address]  Phone: [phone] Fax: [phone]    Primary Care Provider: Moreno Barrett DO    Primary Insurance: MEDICARE  Secondary Insurance: AARP    PROGRESS NOTE: Option paperwork faxed to [facility]

## 2023-06-21 LAB
ALBUMIN SERPL BCP-MCNC: 4.2 G/DL (ref 3.5–5)
ALP SERPL-CCNC: 50 U/L (ref 34–104)
ALT SERPL W P-5'-P-CCNC: 26 U/L (ref 7–52)
ANION GAP SERPL CALCULATED.3IONS-SCNC: 5 MMOL/L
AST SERPL W P-5'-P-CCNC: 28 U/L (ref 13–39)
ATRIAL RATE: 55 BPM
BILIRUB SERPL-MCNC: 1.28 MG/DL (ref 0.2–1)
BUN SERPL-MCNC: 14 MG/DL (ref 5–25)
CALCIUM SERPL-MCNC: 9.3 MG/DL (ref 8.4–10.2)
CHLORIDE SERPL-SCNC: 107 MMOL/L (ref 96–108)
CO2 SERPL-SCNC: 32 MMOL/L (ref 21–32)
CREAT SERPL-MCNC: 1.21 MG/DL (ref 0.6–1.3)
ERYTHROCYTE [DISTWIDTH] IN BLOOD BY AUTOMATED COUNT: 15.1 % (ref 11.6–15.1)
GFR SERPL CREATININE-BSD FRML MDRD: 58 ML/MIN/1.73SQ M
GLUCOSE SERPL-MCNC: 96 MG/DL (ref 65–140)
HCT VFR BLD AUTO: 41.2 % (ref 36.5–49.3)
HGB BLD-MCNC: 13.1 G/DL (ref 12–17)
MCH RBC QN AUTO: 32.1 PG (ref 26.8–34.3)
MCHC RBC AUTO-ENTMCNC: 31.8 G/DL (ref 31.4–37.4)
MCV RBC AUTO: 101 FL (ref 82–98)
P AXIS: 57 DEGREES
PLATELET # BLD AUTO: 165 THOUSANDS/UL (ref 149–390)
PMV BLD AUTO: 11.2 FL (ref 8.9–12.7)
POTASSIUM SERPL-SCNC: 4.4 MMOL/L (ref 3.5–5.3)
PR INTERVAL: 214 MS
PROT SERPL-MCNC: 6.9 G/DL (ref 6.4–8.4)
QRS AXIS: 60 DEGREES
QRSD INTERVAL: 92 MS
QT INTERVAL: 414 MS
QTC INTERVAL: 396 MS
RBC # BLD AUTO: 4.08 MILLION/UL (ref 3.88–5.62)
SODIUM SERPL-SCNC: 144 MMOL/L (ref 135–147)
T WAVE AXIS: 9 DEGREES
VENTRICULAR RATE: 55 BPM
WBC # BLD AUTO: 4.93 THOUSAND/UL (ref 4.31–10.16)

## 2023-06-21 PROCEDURE — 93010 ELECTROCARDIOGRAM REPORT: CPT | Performed by: INTERNAL MEDICINE

## 2023-06-21 PROCEDURE — G0425 INPT/ED TELECONSULT30: HCPCS | Performed by: PSYCHIATRY & NEUROLOGY

## 2023-06-21 PROCEDURE — 99232 SBSQ HOSP IP/OBS MODERATE 35: CPT | Performed by: HOSPITALIST

## 2023-06-21 PROCEDURE — 85027 COMPLETE CBC AUTOMATED: CPT | Performed by: HOSPITALIST

## 2023-06-21 PROCEDURE — 80053 COMPREHEN METABOLIC PANEL: CPT | Performed by: HOSPITALIST

## 2023-06-21 RX ORDER — OLANZAPINE 10 MG/1
2.5 INJECTION, POWDER, LYOPHILIZED, FOR SOLUTION INTRAMUSCULAR EVERY 6 HOURS PRN
Status: DISCONTINUED | OUTPATIENT
Start: 2023-06-21 | End: 2023-06-23

## 2023-06-21 RX ADMIN — PRAVASTATIN SODIUM 40 MG: 40 TABLET ORAL at 18:54

## 2023-06-21 RX ADMIN — LEVOTHYROXINE SODIUM 125 MCG: 25 TABLET ORAL at 06:26

## 2023-06-21 RX ADMIN — QUETIAPINE FUMARATE 12.5 MG: 25 TABLET ORAL at 22:39

## 2023-06-21 RX ADMIN — LATANOPROST 1 DROP: 50 SOLUTION OPHTHALMIC at 22:39

## 2023-06-21 RX ADMIN — OLANZAPINE 2.5 MG: 10 INJECTION, POWDER, FOR SOLUTION INTRAMUSCULAR at 04:25

## 2023-06-21 RX ADMIN — DOCUSATE SODIUM 100 MG: 100 CAPSULE, LIQUID FILLED ORAL at 18:54

## 2023-06-21 NOTE — CASE MANAGEMENT
Case Management Progress Note    Patient name Mark [place]  Location /-99 MRN [id number]  : 1950 Date 2023       LOS (days): 1  Geometric Mean LOS (GMLOS) (days): 4 70  Days to GMLOS:3 8        OBJECTIVE:        Current admission status: Inpatient  Preferred Pharmacy:   [address], [address] [facility]  [address] [facility]  [address] [facility]  Phone: [phone] Fax: [address] [place] [address] [place] [address]  Phone: [phone] Fax: [phone]    Primary Care Provider: Suad Ryan DO    Primary Insurance: MEDICARE  Secondary Insurance: AARP    PROGRESS NOTE: Cm discussed upcoming tour with family at [employer]  Referral sent via fax to [employer] as pts family has an appointment to meet with Admin for a tour on

## 2023-06-21 NOTE — PLAN OF CARE
Problem: Potential for Falls  Goal: Patient will remain free of falls  Description: INTERVENTIONS:  - Educate patient/family on patient safety including physical limitations  - Instruct patient to call for assistance with activity   - Consult OT/PT to assist with strengthening/mobility   - Keep Call bell within reach  - Keep bed low and locked with side rails adjusted as appropriate  - Keep care items and personal belongings within reach  - Initiate and maintain comfort rounds  - Make Fall Risk Sign visible to staff  - Offer Toileting every 2 Hours, in advance of need  - Initiate/Maintain bed alarm  - Obtain necessary fall risk management equipment  - Apply yellow socks and bracelet for high fall risk patients  - Consider moving patient to room near nurses station  Outcome: Progressing     Problem: Prexisting or High Potential for Compromised Skin Integrity  Goal: Skin integrity is maintained or improved  Description: INTERVENTIONS:  - Identify patients at risk for skin breakdown  - Assess and monitor skin integrity  - Assess and monitor nutrition and hydration status  - Monitor labs   - Assess for incontinence   - Turn and reposition patient  - Assist with mobility/ambulation  - Relieve pressure over bony prominences  - Avoid friction and shearing  - Provide appropriate hygiene as needed including keeping skin clean and dry  - Evaluate need for skin moisturizer/barrier cream  - Collaborate with interdisciplinary team   - Patient/family teaching  - Consider wound care consult   Outcome: Progressing     Problem: SAFETY,RESTRAINT: NV/NON-SELF DESTRUCTIVE BEHAVIOR  Goal: Remains free of harm/injury (restraint for non violent/non self-detsructive behavior)  Description: INTERVENTIONS:  - Instruct patient/family regarding restraint use   - Assess and monitor physiologic and psychological status   - Provide interventions and comfort measures to meet assessed patient needs   - Identify and implement measures to help patient regain control  - Assess readiness for release of restraint   Outcome: Progressing  Goal: Returns to optimal restraint-free functioning  Description: INTERVENTIONS:  - Assess the patient's behavior and symptoms that indicate continued need for restraint  - Identify and implement measures to help patient regain control  - Assess readiness for release of restraint   Outcome: Progressing

## 2023-06-21 NOTE — PLAN OF CARE
Problem: Potential for Falls  Goal: Patient will remain free of falls  Description: INTERVENTIONS:  - Educate patient/family on patient safety including physical limitations  - Instruct patient to call for assistance with activity   - Consult OT/PT to assist with strengthening/mobility   - Keep Call bell within reach  - Keep bed low and locked with side rails adjusted as appropriate  - Keep care items and personal belongings within reach  - Initiate and maintain comfort rounds  - Make Fall Risk Sign visible to staff  - Offer Toileting every 2 Hours, in advance of need  - Initiate/Maintain bed alarm  - Obtain necessary fall risk management equipment  - Apply yellow socks and bracelet for high fall risk patients  - Consider moving patient to room near nurses station  Outcome: Progressing     Problem: MOBILITY - ADULT  Goal: Maintain or return to baseline ADL function  Description: INTERVENTIONS:  -  Assess patient's ability to carry out ADLs; assess patient's baseline for ADL function and identify physical deficits which impact ability to perform ADLs (bathing, care of mouth/teeth, toileting, grooming, dressing, etc )  - Assess/evaluate cause of self-care deficits   - Assess range of motion  - Assess patient's mobility; develop plan if impaired  - Assess patient's need for assistive devices and provide as appropriate  - Encourage maximum independence but intervene and supervise when necessary  - Involve family in performance of ADLs  - Assess for home care needs following discharge   - Consider OT consult to assist with ADL evaluation and planning for discharge  - Provide patient education as appropriate  Outcome: Progressing  Goal: Maintains/Returns to pre admission functional level  Description: INTERVENTIONS:  - Perform BMAT or MOVE assessment daily    - Set and communicate daily mobility goal to care team and patient/family/caregiver     - Collaborate with rehabilitation services on mobility goals if consulted  - Perform Range of Motion 2 times a day  - Reposition patient every 2 hours    - Dangle patient 2 times a day  - Stand patient 2 times a day  - Ambulate patient 2 times a day  - Out of bed to chair 2 times a day   - Out of bed for meals 2 times a day  - Out of bed for toileting  - Record patient progress and toleration of activity level   Outcome: Progressing     Problem: Prexisting or High Potential for Compromised Skin Integrity  Goal: Skin integrity is maintained or improved  Description: INTERVENTIONS:  - Identify patients at risk for skin breakdown  - Assess and monitor skin integrity  - Assess and monitor nutrition and hydration status  - Monitor labs   - Assess for incontinence   - Turn and reposition patient  - Assist with mobility/ambulation  - Relieve pressure over bony prominences  - Avoid friction and shearing  - Provide appropriate hygiene as needed including keeping skin clean and dry  - Evaluate need for skin moisturizer/barrier cream  - Collaborate with interdisciplinary team   - Patient/family teaching  - Consider wound care consult   Outcome: Progressing     Problem: SAFETY,RESTRAINT: NV/NON-SELF DESTRUCTIVE BEHAVIOR  Goal: Remains free of harm/injury (restraint for non violent/non self-detsructive behavior)  Description: INTERVENTIONS:  - Instruct patient/family regarding restraint use   - Assess and monitor physiologic and psychological status   - Provide interventions and comfort measures to meet assessed patient needs   - Identify and implement measures to help patient regain control  - Assess readiness for release of restraint   Outcome: Progressing  Goal: Returns to optimal restraint-free functioning  Description: INTERVENTIONS:  - Assess the patient's behavior and symptoms that indicate continued need for restraint  - Identify and implement measures to help patient regain control  - Assess readiness for release of restraint   Outcome: Progressing

## 2023-06-21 NOTE — TELEMEDICINE
TeleConsultation - [facility]   Anette Ndiaye 74 y o  male MRN: [id number]  Unit/Bed#: -01 Encounter: [id number]        REQUIRED DOCUMENTATION:     1  This service was provided via Telemedicine  2  Provider located at [facility]   3  TeleMed provider: Cristobal Grossman MD   4  Identify all parties in room with patient during tele consult:  pt  5  Patient was then informed that this was a Telemedicine visit and that the exam was being conducted confidentially over secure lines  My office door was closed  No one else was in the room  Patient acknowledged consent and understanding of privacy and security of the Telemedicine visit, and gave us permission to have the assistant stay in the room in order to assist with the history and to conduct the exam   I informed the patient that I have reviewed their record in Epic and presented the opportunity for them to ask any questions regarding the visit today  The patient agreed to participate  Assessment/Plan     Present on Admission:  • Memory loss  • Hypothyroidism  • HLD (hyperlipidemia)  • History of B12 deficiency    Assessment:    Unspecified dementia with disturbance of behavior    Treatment Plan:    Plan: Consider increasing Seroquel to 12 5 mg p o  twice daily as mood stabilizer and for possible underlying psychosis  Continue Zyprexa 2 5 to 5 mg p o  or IM every 6 hours as needed agitation  Total Zyprexa should not exceed 30 mg per 24 hours  Consider obtaining RPR, RBC folate and serum 25-hydroxy vitamin D level if he is have not been checked recently  If the patient has not had a complete constant work-up, this can be done on an outpatient basis  Upon discharge recommend memory care unit placement  There is no psychiatric contraindication for such placement at this time  Reconsult psychiatry as needed      Recommend Delirium Precautions:  Maintain sleep-wake cycle, avoid nighttime interruptions  Provide adequate pain control  Avoid urinary retention and constipation  Provide frequent and early mobilization  Provide frequent redirection and reorientation as needed  Avoid medications that may worsen or precipitate delirium such as tramadol, benzodiazepines, anticholinergics, and Benadryl  Redirect unwanted behaviors as first-line therapy, avoid physical restraints as able to  Provide frequent reorientation  Continue to monitor    Current Medications:     Current Facility-Administered Medications   Medication Dose Route Frequency Provider Last Rate   • acetaminophen  650 mg Oral Q6H PRN David Sykes MD     • calcium carbonate  1,000 mg Oral Daily PRN David Sykes MD     • cyanocobalamin  1,000 mcg Oral Daily David Sykes MD     • docusate sodium  100 mg Oral BID David Sykes MD     • enoxaparin  40 mg Subcutaneous Daily David Sykes MD     • ferrous sulfate  325 mg Oral Daily With Breakfast Zan Quijano MD     • fluticasone  1 spray Nasal Daily David Sykes MD     • latanoprost  1 drop Both Eyes HS David Sykes MD     • levothyroxine  125 mcg Oral QAM David Sykes MD     • OLANZapine  2 5 mg Intramuscular Q6H PRN Maribeth Ramon PA-C     • ondansetron  4 mg Intravenous Q6H PRN David Sykes MD     • pravastatin  40 mg Oral Daily With Stevan Do MD     • QUEtiapine  12 5 mg Oral HS Maribeth Ramon PA-C     • senna  1 tablet Oral Daily David Sykes MD         Risks / Benefits of Treatment:    Risks, benefits, and possible side effects of medications explained to patient and patient verbalizes understanding        Other treatment modalities recommended as indicated:    · Memory care unit      Inpatient consult to Psychiatry  Consult performed by: Celina Frausto MD  Consult ordered by: David Sykes MD        Physician Requesting Consult: David Sykes MD  Principal Problem:<principal problem not specified>    Reason for Consult: Agitation      History of Present Illness      Patient is a 74 y o  male who presents to the emergency department with a physician document the followin yo M with PMH of HTN, HLD, hypothyroid presents to ED with increased confusion and bizarre behavior per his daughter in law, whom he has been living with since his recent d/c  Urinating outside of the toilet, saying strange things  No fall or trauma  No fevers  Was complaining of left shoulder pain  He states that is ongoing for 4 months or so  No known injury or trauma  No CP/SOB  No abd pain  No urinary sx  No fevers  Family has made sure he has been getting his meds appropriately  Eating/drinking fine  Pt has no complaints and is calm and cooperative at this time  No etoh use  No focal weakness, but daughter in law did note slurred speech earlier  No facial droop       Recent admission, d/c   Was admitted for encephalopathy  Was advised to seek long term care, not safe to live by himself any longer          History provided by:  Patient and medical records   used: No    Altered Mental Status  Presenting symptoms: behavior changes and confusion    Severity:  Moderate  Most recent episode:  More than 2 days ago  Episode history:  Continuous  Timing:  Intermittent  Progression:  Waxing and waning  Chronicity:  New  Context: not alcohol use and not head injury    Associated symptoms: slurred speech    Associated symptoms: no abdominal pain, no difficulty breathing, no fever, no headaches, no light-headedness, no nausea, no palpitations, no rash, no seizures, no vomiting and no weakness          Prior to Admission Medications   Prescriptions Last Dose Informant Patient Reported? Taking?    Cyanocobalamin 1000 MCG SUBL     Yes No   Sig: every 24 hours   ferrous sulfate 325 (65 Fe) mg tablet     No No   Sig: Take 1 tablet (325 mg total) by mouth daily with breakfast   fluticasone (FLONASE) 50 mcg/act nasal spray     No No   Si spray into each nostril daily   latanoprost (XALATAN) 0 005 % ophthalmic solution     Yes No   Sig: place 1 drop into both eyes at bedtime   levothyroxine 125 mcg tablet     No No   Sig: Take 1 tablet (125 mcg total) by mouth every morning Take on an empty stomach   lovastatin (MEVACOR) 40 MG tablet     No No   Sig: Take 2 tablets (80 mg total) by mouth every 24 hours      Facility-Administered Medications: None         Medical History[]Expand by Default        Past Medical History:   Diagnosis Date   • Disease of thyroid gland     • Hyperlipidemia     • Hypertension              Surgical History[]Expand by Default   Past Surgical History:   Procedure Laterality Date   • HERNIA REPAIR       • REPLACEMENT TOTAL KNEE BILATERAL                Family History[]Expand by Default   Family History   Family history unknown: Yes         I have reviewed and agree with the history as documented            E-Cigarette/Vaping   • E-Cigarette Use Never User        E-Cigarette/Vaping Substances      Social History            Tobacco Use   • Smoking status: Former       Types: Cigarettes       Quit date: 10/23/2009       Years since quittin 6   • Smokeless tobacco: Never   Vaping Use   • Vaping Use: Never used   Substance Use Topics   • Alcohol use: Not Currently       Comment: no drinks in at least 6 months   • Drug use: Never         Review of Systems   Constitutional: Negative for chills, diaphoresis, fatigue, fever and unexpected weight change  HENT: Negative for congestion, ear pain, rhinorrhea, sore throat, trouble swallowing and voice change  Eyes: Negative for pain and visual disturbance  Respiratory: Negative for cough, chest tightness and shortness of breath  Cardiovascular: Negative for chest pain, palpitations and leg swelling  Gastrointestinal: Negative for abdominal pain, blood in stool, constipation, diarrhea, nausea and vomiting  Genitourinary: Negative for difficulty urinating and hematuria  "  Musculoskeletal: Negative for arthralgias, back pain and neck pain  Skin: Negative for rash  Neurological: Negative for dizziness, seizures, syncope, weakness, light-headedness and headaches  Psychiatric/Behavioral: Positive for behavioral problems and confusion  Negative for suicidal ideas  The patient is not nervous/anxious  Nursing reports that the patient had been living with his ex-wife but because he was getting increasingly confused and agitated, his son to command but then experienced similar issues leading to his presentation here to the emergency department  It was reported that at home he was getting into his car and neighbors would observe him pulling out of his driveway then pulling back him because he was confused as to what he was doing  He has been oriented to person only  Due to agitation he is currently in for soft limb restraints with one-to-one continual observation  He has been restless and agitated since this morning and refused his medications  He did however just prior to this visit calm down sufficiently to eat some watch with assistance  Mental status examination: The patient is alert and sitting up in bed  He serves as extremely poor historian  He is oriented only to person and birthdate  He is not able to tell me where he has nor any aspect of time or situation  He stated that he is here \"for a haircut\" after initially he stated he was here \"because of you\"  He denied having any concerns  He denied any mood disturbance at any pain  He denies suicidal homicidal ideation  With other questions she answered and tangential and illogical fashion to include when asked about hallucinations  Memory, cognition, insight and judgment are gravely impaired  I cannot rule out delusions/psychosis at this time given his illogical and nonsensical responses at times      Past Medical History:   Diagnosis Date   • Disease of thyroid gland    • Hyperlipidemia    • Hypertension  " Medical Review Of Systems:    Review of Systems    Meds/Allergies     all current active meds have been reviewed  No Known Allergies    Objective     Vital signs in last 24 hours:  Temp:  [97 1 °F (36 2 °C)-98 5 °F (36 9 °C)] 98 5 °F (36 9 °C)  HR:  [66-81] 81  BP: (132-183)/() 183/103    No intake or output data in the 24 hours ending 06/21/23 6096    Lab Results: I have personally reviewed all pertinent laboratory/tests results  Imaging Studies: XR shoulder 2+ vw right    Result Date: 6/20/2023  Narrative: RIGHT SHOULDER INDICATION:   pain  COMPARISON:  None VIEWS:  XR SHOULDER 2+ VW RIGHT FINDINGS: There is no acute fracture or dislocation  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  No lytic or blastic osseous lesion  Soft tissues are unremarkable  Impression: No acute osseous abnormality  Workstation performed: [id number]     CT head without contrast    Result Date: 6/20/2023  Narrative: CT BRAIN - WITHOUT CONTRAST INDICATION:   Delirium ams  COMPARISON: MR brain dated 6/12/2023, CT head dated 6/10/2023 TECHNIQUE:  CT examination of the brain was performed  Multiplanar 2D reformatted images were created from the source data  Radiation dose length product (DLP) for this visit:  879 55 mGy-cm   This examination, like all CT scans performed in the [facility], was performed utilizing techniques to minimize radiation dose exposure, including the use of iterative  reconstruction and automated exposure control  IMAGE QUALITY:  Diagnostic  FINDINGS: PARENCHYMA: Decreased attenuation is noted in periventricular and subcortical white matter demonstrating an appearance that is statistically most likely to represent advanced microangiopathic change  Gray-white differentiation appears maintained  No CT signs of acute territorial infarction  No intracranial mass, mass effect or midline shift  No acute parenchymal hemorrhage  Parenchymal atrophy  VENTRICLES AND EXTRA-AXIAL SPACES:  Ventricles and extra-axial CSF spaces are prominent commensurate with the degree of volume loss  No hydrocephalus  No acute extra-axial hemorrhage  VISUALIZED ORBITS: Normal visualized orbits  PARANASAL SINUSES: Mild coastal thickening in the ethmoid sinuses  Visualized paranasal sinuses otherwise appear grossly clear  CALVARIUM AND EXTRACRANIAL SOFT TISSUES:  Normal      Impression: No acute intracranial abnormality is seen  Other findings as above  Workstation performed: [id number]     MRI brain wo contrast    Result Date: 6/12/2023  Narrative: MRI BRAIN WITHOUT CONTRAST INDICATION: brain, cognitive changes, eval stroke, mass or other  COMPARISON:   Brain MRI 3/17/2021 TECHNIQUE:  Multiplanar, multisequence imaging of the brain was performed  IMAGE QUALITY:  Diagnostic  FINDINGS: BRAIN PARENCHYMA: Cerebral atrophy  There is no discrete mass, mass effect or midline shift  There is no intracranial hemorrhage  There is no evidence of acute infarction and diffusion imaging is unremarkable  Grossly stable nonspecific confluent T2/FLAIR hyperintensity involving periventricular and subcortical white matter most consistent with advanced microangiopathy  Stable lacunar infarct in the right temporal lobe (series 4 image 13)  VENTRICLES:  Normal for the patient's age  SELLA AND PITUITARY GLAND:  Normal  ORBITS:  Normal  PARANASAL SINUSES:  Normal  VASCULATURE:  Evaluation of the major intracranial vasculature demonstrates appropriate flow voids  CALVARIUM AND SKULL BASE:  Normal  Minimal bilateral mastoid effusion  EXTRACRANIAL SOFT TISSUES:  Normal      Impression: 1  No acute ischemia  Stable old lacunar infarct in the right temporal lobe  2   Grossly stable nonspecific white matter change suggesting microangiopathy  Workstation performed: [id number]     XR chest 1 view portable    Result Date: 6/10/2023  Narrative: CHEST INDICATION:   Confusion  COMPARISON: CXR 10/23/2020   EXAM PERFORMED/VIEWS:  XR CHEST PORTABLE  FINDINGS: Cardiomediastinal silhouette normal  Moderate hiatal hernia  Lungs clear  No effusion or pneumothorax  Upper abdomen normal  Bones normal for age  Impression: No acute cardiopulmonary disease  Workstation performed: [id number]     CT head without contrast    Result Date: 6/10/2023  Narrative: CT BRAIN - WITHOUT CONTRAST INDICATION:   Confusion  COMPARISON:  None  TECHNIQUE:  CT examination of the brain was performed  Multiplanar 2D reformatted images were created from the source data  Radiation dose length product (DLP) for this visit:  901 74 mGy-cm   This examination, like all CT scans performed in the [facility], was performed utilizing techniques to minimize radiation dose exposure, including the use of iterative  reconstruction and automated exposure control  IMAGE QUALITY:  Diagnostic  FINDINGS: PARENCHYMA: 10/23/2020 VENTRICLES AND EXTRA-AXIAL SPACES:  Normal for the patient's age  VISUALIZED ORBITS: Normal visualized orbits  PARANASAL SINUSES: Normal visualized paranasal sinuses  CALVARIUM AND EXTRACRANIAL SOFT TISSUES:  Normal      Impression: No acute intracranial abnormality  Workstation performed: [id number]     EKG/Pathology/Other Studies:   Lab Results   Component Value Date    VENTRATE 55 06/20/2023    ATRIALRATE 55 06/20/2023    PRINT 214 06/20/2023    QRSDINT 92 06/20/2023    QTINT 414 06/20/2023    QTCINT 396 06/20/2023    PAXIS 57 06/20/2023    QRSAXIS 60 06/20/2023    TWAVEAXIS 9 06/20/2023        Code Status: Level 1 - Full Code  Advance Directive and Living Will:      Power of :    POLST:      Screenings:    1  Nutrition Screening  · Not available on chart    2  Pain Screening  Not available on chart    3  Suicide Screening  Not available on chart    Counseling / Coordination of Care: Total floor / unit time spent today 30 minutes   Greater than 50% of total time was spent with the patient and / or family counseling and / or coordination of care  A description of the counseling / coordination of care: Chart review, patient evaluation, coordination and communication with staff, nursing and provider

## 2023-06-21 NOTE — PHYSICAL THERAPY NOTE
PHYSICAL THERAPY CANCELLATION NOTE      Patient Name: Gris Black  YGSAL'H Date: 6/21/2023 06/21/23 9276   Note Type   Note type Cancelled Session   Additional Comments PT orders received, chart review performed  Spoke to [employer], pt is currently on 1:1, restrained  Pt is very confused, poor command following, not appropriate to participate in PT eval  Per RN ED notes, pt was ambulating laps around the unit   Will f/u as appropriate to perform PT eval       Asher Perez, PT, DPT

## 2023-06-21 NOTE — CASE MANAGEMENT
Case Management Progress Note    Patient name Kimberly Lamar  Location /-28 MRN [id number]  : 1950 Date 2023       LOS (days): 1  Geometric Mean LOS (GMLOS) (days): 4 70  Days to GMLOS:3 6        OBJECTIVE:        Current admission status: Inpatient  Preferred Pharmacy:   [address]  [address] [facility],[address]  [address]  Phone: [phone] Fax: [id number] Charisma Black, [address] [place] [address]  Phone: [phone] Fax: [phone]    Primary Care Provider: Cole Gutierres DO    Primary Insurance: MEDICARE  Secondary Insurance: AARP    PROGRESS NOTE: Cm spoke with pts BERNARDA Hillman to discuss update on referrals  Family is [address] today and has a tour scheduled for [employer]  Family is also requesting a tour with [place] at [facility]

## 2023-06-21 NOTE — PROGRESS NOTES
[place]  Progress Note  Name: Mily Siddiqi  MRN: [id number]  Unit/Bed#: -01 I Date of Admission: 2023   Date of Service: 2023 I Hospital Day: 1    Assessment/Plan   Memory loss  Assessment & Plan  Patient returns with similar issues as prior admission with disruptive behaviors and family unable to care for patient anymore  Patient noted to have during that admission unremarkable work-up, suspected progressive dementia with low MoCA score  MRI at that time demonstrated old stroke but no acute findings  Case management involved for placement  Neuropsych consulted  Psychiatry consulted d/t agitation    History of B12 deficiency  Assessment & Plan  Cont b12    HLD (hyperlipidemia)  Assessment & Plan  statin    Hypothyroidism  Assessment & Plan  Continue with Synthroid replacement therapy              VTE  Prophylaxis:   Pharmacologic: in place  Mechanical VTE Prophylaxis in Place: Yes    Patient Centered Rounds: I have performed bedside rounds with nursing staff today  Discussions with Specialists or Other Care Team Provider: case management    Education and Discussions with Family / Patient: pt      Current Length of Stay: 1 day(s)    Current Patient Status: Inpatient        Code Status: Level 1 - Full Code    Discharge Plan: Pt will require continued inpatient hospitalization  Subjective:   Pt states no acute complaints  Was restrained    Patient is seen and examined at bedside  All other ROS are negative  Objective:     Vitals:   Temp (24hrs), Av 1 °F (36 2 °C), Min:97 1 °F (36 2 °C), Max:97 1 °F (36 2 °C)    Temp:  [97 1 °F (36 2 °C)] 97 1 °F (36 2 °C)  HR:  [61-81] 66  Resp:  [18] 18  BP: (132-170)/(88-98) 170/98  SpO2:  [96 %] 96 %  Body mass index is 29 37 kg/m²       Input and Output Summary (last 24 hours):     No intake or output data in the 24 hours ending 23 1247    Physical Exam:       GEN: No acute distress, comfortable, chronically ill appearing in restraints  HEEENT: No JVD, PERRLA, no scleral icterus  RESP: Lungs clear to auscultation bilaterally  CV: RRR, +s1/s2   ABD: SOFT NON TENDER, POSITIVE BOWEL SOUNDS, NO DISTENTION  PSYCH: CALM, apparent dementia  NEURO: Mentation baseline, NO FOCAL DEFICITS  SKIN: NO RASH  EXTREM: NO EDEMA    Additional Data:     Labs:    Results from last 7 days   Lab Units 06/21/23  0450 06/20/23  0259   WBC Thousand/uL 4 93 4 83   HEMOGLOBIN g/dL 13 1 13 5   HEMATOCRIT % 41 2 41 6   PLATELETS Thousands/uL 165 155   NEUTROS PCT %  --  58   LYMPHS PCT %  --  29   MONOS PCT %  --  8   EOS PCT %  --  4     Results from last 7 days   Lab Units 06/21/23  0450   SODIUM mmol/L 144   POTASSIUM mmol/L 4 4   CHLORIDE mmol/L 107   CO2 mmol/L 32   BUN mg/dL 14   CREATININE mg/dL 1 21   ANION GAP mmol/L 5   CALCIUM mg/dL 9 3   ALBUMIN g/dL 4 2   TOTAL BILIRUBIN mg/dL 1 28*   ALK PHOS U/L 50   ALT U/L 26   AST U/L 28   GLUCOSE RANDOM mg/dL 96                       Lines/Drains:  Invasive Devices     Peripheral Intravenous Line  Duration           Peripheral IV 06/20/23 Right Antecubital 1 day                Telemetry:        * I Have Reviewed All Lab Data Listed Above  Imaging:     Results for orders placed during the hospital encounter of 06/10/23    XR chest 1 view portable    Narrative  CHEST    INDICATION:   Confusion  COMPARISON: CXR 10/23/2020  EXAM PERFORMED/VIEWS:  XR CHEST PORTABLE  FINDINGS:    Cardiomediastinal silhouette normal  Moderate hiatal hernia  Lungs clear  No effusion or pneumothorax  Upper abdomen normal  Bones normal for age  Impression  No acute cardiopulmonary disease  Workstation performed: [id number]    No results found for this or any previous visit        *I have reviewed all imaging reports listed above      Recent Cultures (last 7 days):           Last 24 Hours Medication List:   Current Facility-Administered Medications   Medication Dose Route Frequency Provider Last Rate   • acetaminophen  650 mg Oral Q6H PRN Siddharth Mercer MD     • calcium carbonate  1,000 mg Oral Daily PRN Siddharth Mercer MD     • cyanocobalamin  1,000 mcg Oral Daily Siddharth Mercer MD     • docusate sodium  100 mg Oral BID Siddharth Mercer MD     • enoxaparin  40 mg Subcutaneous Daily Siddharth Mercer MD     • ferrous sulfate  325 mg Oral Daily With Breakfast Siddharth Mercer MD     • fluticasone  1 spray Nasal Daily Siddharth Mercer MD     • latanoprost  1 drop Both Eyes HS Siddharth Mercer MD     • levothyroxine  125 mcg Oral QAM Siddharth Mercer MD     • OLANZapine  2 5 mg Intramuscular Q6H PRN Surya Encarnacion PA-C     • ondansetron  4 mg Intravenous Q6H PRN Siddharth Mercer MD     • pravastatin  40 mg Oral Daily With Kavita Villatoro MD     • QUEtiapine  12 5 mg Oral HS Surya Encarnacion PA-C     • senna  1 tablet Oral Daily Siddharth Mercer MD          Today, Patient Was Seen By: Siddharth Mercer MD    ** Please Note: Dictation voice to text software may have been used in the creation of this document   **

## 2023-06-21 NOTE — OCCUPATIONAL THERAPY NOTE
Occupational Therapy Cx Note     Patient Name: Linda MARTELYV'W Date: 6/21/2023  Problem List  Active Problems:    Hypothyroidism    HLD (hyperlipidemia)    History of B12 deficiency    Memory loss            06/21/23 1403   OT Last Visit   OT Visit Date 06/21/23   Note Type   Note type Cancelled Session   Cancel Reasons Medical status   Additional Comments OT orders received  Chart reviewed  Pt currently on 1:1 & restrained  Per RN - pt is confused with poor command following  Pt not appropriate for OT eval  Per documentation, pt was walking laps around the unit  Will f/u as able & appropriate         Britni Yates, OTR/L

## 2023-06-21 NOTE — ASSESSMENT & PLAN NOTE
Patient returns with similar issues as prior admission with disruptive behaviors and family unable to care for patient anymore  Patient noted to have during that admission unremarkable work-up, suspected progressive dementia with low MoCA score  MRI at that time demonstrated old stroke but no acute findings  Case management involved for placement  Neuropsych consulted  Psychiatry consulted d/t agitation

## 2023-06-22 LAB
ALBUMIN SERPL BCP-MCNC: 4.4 G/DL (ref 3.5–5)
ALP SERPL-CCNC: 53 U/L (ref 34–104)
ALT SERPL W P-5'-P-CCNC: 24 U/L (ref 7–52)
ANION GAP SERPL CALCULATED.3IONS-SCNC: 5 MMOL/L
AST SERPL W P-5'-P-CCNC: 27 U/L (ref 13–39)
BASOPHILS # BLD AUTO: 0.05 THOUSANDS/ÂΜL (ref 0–0.1)
BASOPHILS NFR BLD AUTO: 1 % (ref 0–1)
BILIRUB SERPL-MCNC: 1.37 MG/DL (ref 0.2–1)
BUN SERPL-MCNC: 14 MG/DL (ref 5–25)
CALCIUM SERPL-MCNC: 9.3 MG/DL (ref 8.4–10.2)
CHLORIDE SERPL-SCNC: 106 MMOL/L (ref 96–108)
CO2 SERPL-SCNC: 32 MMOL/L (ref 21–32)
CREAT SERPL-MCNC: 1.19 MG/DL (ref 0.6–1.3)
EOSINOPHIL # BLD AUTO: 0.19 THOUSAND/ÂΜL (ref 0–0.61)
EOSINOPHIL NFR BLD AUTO: 3 % (ref 0–6)
ERYTHROCYTE [DISTWIDTH] IN BLOOD BY AUTOMATED COUNT: 15 % (ref 11.6–15.1)
GFR SERPL CREATININE-BSD FRML MDRD: 59 ML/MIN/1.73SQ M
GLUCOSE SERPL-MCNC: 96 MG/DL (ref 65–140)
HCT VFR BLD AUTO: 43.3 % (ref 36.5–49.3)
HGB BLD-MCNC: 14.1 G/DL (ref 12–17)
IMM GRANULOCYTES # BLD AUTO: 0.02 THOUSAND/UL (ref 0–0.2)
IMM GRANULOCYTES NFR BLD AUTO: 0 % (ref 0–2)
LYMPHOCYTES # BLD AUTO: 1.91 THOUSANDS/ÂΜL (ref 0.6–4.47)
LYMPHOCYTES NFR BLD AUTO: 30 % (ref 14–44)
MCH RBC QN AUTO: 32.9 PG (ref 26.8–34.3)
MCHC RBC AUTO-ENTMCNC: 32.6 G/DL (ref 31.4–37.4)
MCV RBC AUTO: 101 FL (ref 82–98)
MONOCYTES # BLD AUTO: 0.64 THOUSAND/ÂΜL (ref 0.17–1.22)
MONOCYTES NFR BLD AUTO: 10 % (ref 4–12)
NEUTROPHILS # BLD AUTO: 3.64 THOUSANDS/ÂΜL (ref 1.85–7.62)
NEUTS SEG NFR BLD AUTO: 56 % (ref 43–75)
NRBC BLD AUTO-RTO: 0 /100 WBCS
PLATELET # BLD AUTO: 179 THOUSANDS/UL (ref 149–390)
PMV BLD AUTO: 10.5 FL (ref 8.9–12.7)
POTASSIUM SERPL-SCNC: 4 MMOL/L (ref 3.5–5.3)
PROT SERPL-MCNC: 7.1 G/DL (ref 6.4–8.4)
RBC # BLD AUTO: 4.29 MILLION/UL (ref 3.88–5.62)
SODIUM SERPL-SCNC: 143 MMOL/L (ref 135–147)
WBC # BLD AUTO: 6.45 THOUSAND/UL (ref 4.31–10.16)

## 2023-06-22 PROCEDURE — 85025 COMPLETE CBC W/AUTO DIFF WBC: CPT | Performed by: HOSPITALIST

## 2023-06-22 PROCEDURE — 80053 COMPREHEN METABOLIC PANEL: CPT | Performed by: HOSPITALIST

## 2023-06-22 PROCEDURE — 97167 OT EVAL HIGH COMPLEX 60 MIN: CPT

## 2023-06-22 PROCEDURE — 99232 SBSQ HOSP IP/OBS MODERATE 35: CPT | Performed by: HOSPITALIST

## 2023-06-22 RX ORDER — QUETIAPINE FUMARATE 25 MG/1
12.5 TABLET, FILM COATED ORAL 2 TIMES DAILY
Status: DISCONTINUED | OUTPATIENT
Start: 2023-06-22 | End: 2023-06-23

## 2023-06-22 RX ADMIN — PRAVASTATIN SODIUM 40 MG: 40 TABLET ORAL at 17:24

## 2023-06-22 RX ADMIN — QUETIAPINE FUMARATE 12.5 MG: 25 TABLET ORAL at 10:24

## 2023-06-22 RX ADMIN — CYANOCOBALAMIN TAB 500 MCG 1000 MCG: 500 TAB at 10:24

## 2023-06-22 RX ADMIN — LATANOPROST 1 DROP: 50 SOLUTION OPHTHALMIC at 21:22

## 2023-06-22 RX ADMIN — SENNOSIDES 8.6 MG: 8.6 TABLET, FILM COATED ORAL at 10:24

## 2023-06-22 RX ADMIN — ENOXAPARIN SODIUM 40 MG: 100 INJECTION SUBCUTANEOUS at 10:25

## 2023-06-22 RX ADMIN — LEVOTHYROXINE SODIUM 125 MCG: 25 TABLET ORAL at 10:25

## 2023-06-22 RX ADMIN — DOCUSATE SODIUM 100 MG: 100 CAPSULE, LIQUID FILLED ORAL at 10:24

## 2023-06-22 RX ADMIN — DOCUSATE SODIUM 100 MG: 100 CAPSULE, LIQUID FILLED ORAL at 17:24

## 2023-06-22 RX ADMIN — FERROUS SULFATE TAB 325 MG (65 MG ELEMENTAL FE) 325 MG: 325 (65 FE) TAB at 10:24

## 2023-06-22 RX ADMIN — QUETIAPINE FUMARATE 12.5 MG: 25 TABLET ORAL at 17:24

## 2023-06-22 NOTE — CASE MANAGEMENT
Case Management Progress Note    Patient name Valeria Varghese  Location /-92 MRN [id number]  : 1950 Date 2023       LOS (days): 2  Geometric Mean LOS (GMLOS) (days): 4 70  Days to GMLOS:2 6        OBJECTIVE:        Current admission status: Inpatient  Preferred Pharmacy:   [address]  [address] [facility],[address]  [address]  Phone: [phone] Fax: [address] [place] [address] [place] [id number] [address]  Phone: [phone] Fax: [phone]    Primary Care Provider: Alta Christie DO    Primary Insurance: MEDICARE  Secondary Insurance: AARP    PROGRESS NOTE: Cm received [facility] Determination Letter for SNF  Pt is eligible for SNF  Letter attached to referrals in [place]

## 2023-06-22 NOTE — PLAN OF CARE
Problem: OCCUPATIONAL THERAPY ADULT  Goal: Performs self-care activities at highest level of function for planned discharge setting  See evaluation for individualized goals  Description: Treatment Interventions: ADL retraining, Cognitive reorientation, Patient/family training, Equipment evaluation/education, Functional transfer training          See flowsheet documentation for full assessment, interventions and recommendations  Note: Limitation: Decreased ADL status, Decreased Safe judgement during ADL, Decreased cognition, Decreased self-care trans, Decreased high-level ADLs  Prognosis: Fair  Assessment: Pt is a 74 y o  male seen for OT evaluation at [facility], admitted 6/20/2023 w/ failure to thrive  OT completed extensive review of pt's medical and social history  Comorbidities affecting pt's functional performance at time of assessment include: HLD, memory loss, generalized weakness, macular degeneration, glaucoma, HTN  Personal factors affecting pt at time of IE include: steps to enter environment, limited home support, behavioral pattern, difficulty performing ADLS, difficulty performing IADLS , limited insight into deficits and decreased initiation and engagement   Prior to admission, pt was living with his SO in a bi-level home with 16 CHAZ  Pt was A w/ ADLS and w/ IADLS, (-) drove, & required use of no DME/AD PTA  Upon evaluation: Pt requires S/CGA for functional mobility/transfers, S for UB ADLs and Min A for LB ADLS 2* the following deficits impacting occupational performance: decreased balance, impaired attention, impaired initiation, impaired memory, impaired sequencing, impaired problem solving, impulsivity and decreased safety awareness  Full objective findings from OT assessment regarding body systems outlined above   Pt to benefit from continued skilled OT tx while in the hospital to address deficits as defined above and maximize level of functional independence w/ ADL's and functional mobility  Occupational Performance areas to address include: grooming, bathing/shower, toilet hygiene, dressing, functional mobility and clothing management  Based on findings, pt is of high complexity  The patient's raw score on the AM-PAC Daily Activity inpatient short form is 19, standardized score is 40 22, greater than 39 4  Patients at this level are likely to benefit from DC to home  However, please refer to therapist recommendation for discharge planning given other factors that may influence destination  At this time, OT recommendations at time of discharge are DC with Level III resources

## 2023-06-22 NOTE — ASSESSMENT & PLAN NOTE
Patient returns with similar issues as prior admission with disruptive behaviors and family unable to care for patient anymore  Patient noted to have during that admission unremarkable work-up, suspected progressive dementia with low MoCA score  MRI at that time demonstrated old stroke but no acute findings  Case management involved for placement  Neuropsych consulted  Psychiatry consulted d/t agitation  Seroquel uptitrated   Wean restraints as tolerated

## 2023-06-22 NOTE — CONSULTS
Consultation - Neuropsychology/Psychology Department  Oleksandr Jeronimo 74 y o  male MRN: [id number]  Unit/Bed#: -01 Encounter: [id number]        Reason for Consultation:  Oleksandr Jeronimo is a 74y o  year old male who was referred for a Neuropsychological Exam to assess cognitive functioning and comment on capacity to make informed medical decisions      History of Present Illness Change in mental status, memory loss    Physician Requesting Consult: Mai Shin MD    PROBLEM LIST:  Patient Active Problem List   Diagnosis   • Vertigo   • Hypothyroidism   • Benign essential HTN   • HLD (hyperlipidemia)   • TORI on CPAP   • Generalized weakness   • Elevated serum creatinine   • History of B12 deficiency   • Macular degeneration   • Glaucoma   • Memory loss         Historical Information   Past Medical History:   Diagnosis Date   • Disease of thyroid gland    • Hyperlipidemia    • Hypertension      Past Surgical History:   Procedure Laterality Date   • HERNIA REPAIR     • REPLACEMENT TOTAL KNEE BILATERAL       Social History   Social History     Substance and Sexual Activity   Alcohol Use Not Currently    Comment: no drinks in at least 6 months     Social History     Substance and Sexual Activity   Drug Use Never     Social History     Tobacco Use   Smoking Status Former   • Types: Cigarettes   • Quit date: 10/23/2009   • Years since quittin 6   Smokeless Tobacco Never     Family History:   Family History   Family history unknown: Yes       Meds/Allergies   current meds:   Current Facility-Administered Medications   Medication Dose Route Frequency   • acetaminophen (TYLENOL) tablet 650 mg  650 mg Oral Q6H PRN   • calcium carbonate (TUMS) chewable tablet 1,000 mg  1,000 mg Oral Daily PRN   • cyanocobalamin (VITAMIN B-12) tablet 1,000 mcg  1,000 mcg Oral Daily   • docusate sodium (COLACE) capsule 100 mg  100 mg Oral BID   • enoxaparin (LOVENOX) subcutaneous injection 40 mg  40 mg Subcutaneous Daily   • ferrous sulfate tablet 325 mg  325 mg Oral Daily With Breakfast   • fluticasone (FLONASE) 50 mcg/act nasal spray 1 spray  1 spray Nasal Daily   • latanoprost (XALATAN) 0 005 % ophthalmic solution 1 drop  1 drop Both Eyes HS   • levothyroxine tablet 125 mcg  125 mcg Oral QAM   • OLANZapine (ZyPREXA) IM injection 2 5 mg  2 5 mg Intramuscular Q6H PRN   • ondansetron (ZOFRAN) injection 4 mg  4 mg Intravenous Q6H PRN   • pravastatin (PRAVACHOL) tablet 40 mg  40 mg Oral Daily With Dinner   • QUEtiapine (SEROquel) tablet 12 5 mg  12 5 mg Oral BID   • senna (SENOKOT) tablet 8 6 mg  1 tablet Oral Daily       No Known Allergies      Family and Social Support:   No data recorded    Behavioral Observations: Alert, UNABLE to accurately state the year (1924), season, month, day/week, day/month, state, city and name of hospital; denied anxiety and depressed mood; thoughts were at times tangential; patient was unable to state reason for hospitalization, medical history and does not know what he is being trreted for in hospital; denied psychiatric history    Cognitive Examination    General Cognitive Functioning MMSE = Deficits in orientation, recall and working memory    Attention/Concentration Auditory Selective Attention = Impaired;      Frontal Systems/Executive Functioning Mental Flexibility/Cognitive Control = Impaired; Working Memory = Impaired Abstract Reasoning = Impaired;  Generative Ability = Impaired, Commonsense Reasoning and Judgement = Impaired    Language Functioning  Phonemic Fluency = Impaired; Semantic Retrieval = Impaired; Comprehension of Complex Ideational Material = Impaired;     Memory Functioning Narrative Recall - Short Delay = Impaired;  Long Delay Narrative Recall = Impaired; three word recall = Impaired    Visuo-Spatial Abilities Not Assessed    Functional Knowledge  Health & Safety Knowledge = Impaired;     Summary/Impression:  Results of Neuropsychological Exam revealed diffuse cognitive dysfunction and on a measure assessing awareness of personal health status and ability to evaluate health problems, handle medical emergencies and take safety precautions, patient performed in the IMPAIRED range of functioning  During this encounter, patient does not appear to have capacity to make fully informed medical decisions

## 2023-06-22 NOTE — OCCUPATIONAL THERAPY NOTE
"    Occupational Therapy Evaluation     Patient Name: Mark Souza  SLWTZ'C Date: 6/22/2023  Problem List  Active Problems:    Hypothyroidism    HLD (hyperlipidemia)    History of B12 deficiency    Memory loss    Past Medical History  Past Medical History:   Diagnosis Date    Disease of thyroid gland     Hyperlipidemia     Hypertension      Past Surgical History  Past Surgical History:   Procedure Laterality Date    HERNIA REPAIR      REPLACEMENT TOTAL KNEE BILATERAL          06/22/23 1512   OT Last Visit   OT Visit Date 06/22/23   Note Type   Note type Evaluation   Pain Assessment   Pain Assessment Tool Lee-Baker FACES   Pain Score No Pain   Restrictions/Precautions   Weight Bearing Precautions Per Order No   Other Precautions Fall Risk;Cognitive; Chair Alarm; Bed Alarm   Home Living   Type of [facility] 25 Layout Two level;Bed/bath upstairs  (Bi-level 16 CHAZ)   Additional Comments No DME   Prior Function   Level of [place] Needs assistance with ADLs; Needs assistance with IADLS; Independent with functional mobility  (Per chart - requires VC's for ADLs)   Lives With Significant other   Receives Help From Family   IADLs Family/Friend/Other provides transportation; Family/Friend/Other provides meals; Family/Friend/Other provides medication management   Comments Pt evaluated by OT on 6/13/23  Pt scored 1/49 on MoCA & license was suspended  Pt was working part time at [employer]   Family/Caregiver Present No   Subjective   Subjective \"[facility] at the [facility]\"   ADL   Eating Assistance 7  Independent   Grooming Assistance 5  Supervision/Setup   UB Bathing Assistance 5  Supervision/Setup   LB Bathing Assistance 5  Supervision/Setup   UB Dressing Assistance 5  Supervision/Setup   LB Dressing Assistance 4  Minimal Assistance   LB Dressing Deficit Don/doff R sock; Don/doff L sock   Toileting Assistance  4  Minimal Assistance   Toileting Deficit Use of bedpan/urinal setup  (Urinal in stance)   Additional Comments Pt " "required verbal cues for all sequencing during ADL tasks  Pt with poor completion of task, does not pull BL socks over heels   Bed Mobility   Additional Comments Pt received standing in room with PCA   Transfers   Sit to Stand 5  Supervision   Additional items Armrests; Verbal cues   Stand to Sit 5  Supervision   Additional items Armrests; Verbal cues   Functional Mobility   Functional Mobility 5  Supervision   Additional Comments Greater than functional household distance in unit hallway with WC follow  Required CGA at points d/t cognition   Additional items Rolling walker   Balance   Static Sitting Good   Dynamic Sitting Good   Static Standing Fair +   Dynamic Standing Fair +   Ambulatory Fair +   Activity Tolerance   Activity Tolerance Other (Comment)  (Cognition)   Nurse Made Aware SHEFALI Lund/Basia STEPHENS Assessment   RUE Assessment WFL   LUE Assessment   LUE Assessment WFL   Vision-Basic Assessment   Current Vision Wears glasses all the time   Cognition   Overall Cognitive Status Impaired   Arousal/Participation Alert; Cooperative   Attention Difficulty attending to directions   Orientation Level Oriented to person;Disoriented to time;Disoriented to place; Disoriented to situation  (\"95, 94\" for year, thinks he is at the [employer] game (on TV))   Memory Decreased recall of precautions;Decreased recall of recent events;Decreased short term memory;Decreased long term memory;Decreased recall of biographical information   Following Commands Follows one step commands with increased time or repetition   Assessment   Limitation Decreased ADL status; Decreased Safe judgement during ADL;Decreased cognition;Decreased self-care trans;Decreased high-level ADLs   Prognosis Fair   Assessment Pt is a 74 y o  male seen for OT evaluation at [address] [place] [address], admitted 6/20/2023 w/ failure to thrive  OT completed extensive review of pt's medical and social history   Comorbidities affecting pt's functional performance at time of " assessment include: HLD, memory loss, generalized weakness, macular degeneration, glaucoma, HTN  Personal factors affecting pt at time of IE include: steps to enter environment, limited home support, behavioral pattern, difficulty performing ADLS, difficulty performing IADLS , limited insight into deficits and decreased initiation and engagement   Prior to admission, pt was living with his SO in a bi-level home with 16 CHAZ  Pt was A w/ ADLS and w/ IADLS, (-) drove, & required use of no DME/AD PTA  Upon evaluation: Pt requires S/CGA for functional mobility/transfers, S for UB ADLs and Min A for LB ADLS 2* the following deficits impacting occupational performance: decreased balance, impaired attention, impaired initiation, impaired memory, impaired sequencing, impaired problem solving, impulsivity and decreased safety awareness  Full objective findings from OT assessment regarding body systems outlined above  Pt to benefit from continued skilled OT tx while in the hospital to address deficits as defined above and maximize level of functional independence w/ ADL's and functional mobility  Occupational Performance areas to address include: grooming, bathing/shower, toilet hygiene, dressing, functional mobility and clothing management  Based on findings, pt is of high complexity  The patient's raw score on the AM-PAC Daily Activity inpatient short form is 19, standardized score is 40 22, greater than 39 4  Patients at this level are likely to benefit from DC to home  However, please refer to therapist recommendation for discharge planning given other factors that may influence destination  At this time, OT recommendations at time of discharge are DC with Level III resources  Goals   Patient Goals Pt does not verbalize goals   Plan   Treatment Interventions ADL retraining;Cognitive reorientation;Patient/family training;Equipment evaluation/education; Functional transfer training   Goal Expiration Date 07/02/23   OT Treatment Day 0   OT Frequency 1-2x/wk   Recommendation   UB Rehab Discharge Recommendation (PT/OT) Level 3   AM-PAC Daily Activity Inpatient   Lower Body Dressing 3   Bathing 3   Toileting 3   Upper Body Dressing 3   Grooming 3   Eating 4   Daily Activity Raw Score 19   Daily Activity Standardized Score (Calc for Raw Score >=11) 40 22   AM-PAC Applied Cognition Inpatient   Following a Speech/Presentation 2   Understanding Ordinary Conversation 3   Taking Medications 2   Remembering Where Things Are Placed or Put Away 1   Remembering List of 4-5 Errands 1   Taking Care of Complicated Tasks 1   Applied Cognition Raw Score 10   Applied Cognition Standardized Score 24 98   End of Consult   Education Provided Yes   Patient Position at End of Consult Bedside chair;Bed/Chair alarm activated; All needs within reach   Nurse Communication Nurse aware of consult     Pt will achieve the following goals within 10 days  *Pt will complete LB bathing and dressing with S & VCs PRN  *Pt will complete toileting w/ S w/ G hygiene/thoroughness using DME PRN & VCs PRN  *Pt will perform functional transfers with on/off all surfaces with Mod I using DME as needed w/ G balance/safety  *Pt will improve functional mobility during ADL/IADL/leisure tasks to Mod I using DME as needed w/ G balance/safety  *Pt will follow simple, one-step commands 75% of the time to enhance participation in ADL performance      Itz Mills OTR/L

## 2023-06-22 NOTE — CASE MANAGEMENT
Case Management Progress Note    Patient name Maryuri Wadsworth  Location /-65 MRN [id number]  : 1950 Date 2023       LOS (days): 2  Geometric Mean LOS (GMLOS) (days): 4 70  Days to GMLOS:2 5        OBJECTIVE:        Current admission status: Inpatient  Preferred Pharmacy:   [address], [address] [facility]  [address] [facility]  [address]  Phone: [phone] Fax: [id number] [facility] [address] [address] [place]  Phone: [phone] Fax: [phone]    Primary Care Provider: Mamadou Peter DO    Primary Insurance: MEDICARE  Secondary Insurance: AARP    PROGRESS NOTE: Cm spoke with pts Samantha Damon today via phone  Referrals reviewed  Family is interested in [facility] at [facility] and [place]  Cm gave DIL Lydia phone number to reach the facilities to arrange tours

## 2023-06-22 NOTE — PROGRESS NOTES
[place]  Progress Note  Name: Jeremy Phillips  MRN: [id number]  Unit/Bed#: -01 I Date of Admission: 2023   Date of Service: 2023 I Hospital Day: 2    Assessment/Plan   Memory loss  Assessment & Plan  Patient returns with similar issues as prior admission with disruptive behaviors and family unable to care for patient anymore  Patient noted to have during that admission unremarkable work-up, suspected progressive dementia with low MoCA score  MRI at that time demonstrated old stroke but no acute findings  Case management involved for placement  Neuropsych consulted  Psychiatry consulted d/t agitation  Seroquel uptitrated  Wean restraints as tolerated    History of B12 deficiency  Assessment & Plan  Cont b12    HLD (hyperlipidemia)  Assessment & Plan  statin    Hypothyroidism  Assessment & Plan  Continue with Synthroid replacement therapy            VTE  Prophylaxis:   Pharmacologic: in place  Mechanical VTE Prophylaxis in Place: Yes    Patient Centered Rounds: I have performed bedside rounds with nursing staff today  Discussions with Specialists or Other Care Team Provider: case management         Current Length of Stay: 2 day(s)    Current Patient Status: Inpatient        Code Status: Level 1 - Full Code    Discharge Plan: Pt will require continued inpatient hospitalization  Subjective:     Pt remains on 1:1, restraints  Limited historian 2/2 dementia    Patient is seen and examined at bedside  All other ROS are negative  Objective:     Vitals:   Temp (24hrs), Av 1 °F (36 7 °C), Min:97 6 °F (36 4 °C), Max:98 5 °F (36 9 °C)    Temp:  [97 6 °F (36 4 °C)-98 5 °F (36 9 °C)] 97 6 °F (36 4 °C)  HR:  [68-83] 68  Resp:  [14-18] 18  BP: (117-183)/() 146/96  SpO2:  [95 %-98 %] 95 %  Body mass index is 25 23 kg/m²  Input and Output Summary (last 24 hours):        Intake/Output Summary (Last 24 hours) at 2023 4189  Last data filed at 2023 2013  Gross per 24 hour   Intake --   Output 381 ml   Net -381 ml       Physical Exam:       GEN: No acute distress, comfortable, restraints  HEEENT: No JVD, PERRLA, no scleral icterus  RESP: Lungs clear to auscultation bilaterally  CV: RRR, +s1/s2   ABD: SOFT NON TENDER, POSITIVE BOWEL SOUNDS, NO DISTENTION  PSYCH: CALM  NEURO: Mentation baseline, NO FOCAL DEFICITS  SKIN: NO RASH  EXTREM: NO EDEMA    Additional Data:     Labs:    Results from last 7 days   Lab Units 06/22/23  0427   WBC Thousand/uL 6 45   HEMOGLOBIN g/dL 14 1   HEMATOCRIT % 43 3   PLATELETS Thousands/uL 179   NEUTROS PCT % 56   LYMPHS PCT % 30   MONOS PCT % 10   EOS PCT % 3     Results from last 7 days   Lab Units 06/22/23  0427   SODIUM mmol/L 143   POTASSIUM mmol/L 4 0   CHLORIDE mmol/L 106   CO2 mmol/L 32   BUN mg/dL 14   CREATININE mg/dL 1 19   ANION GAP mmol/L 5   CALCIUM mg/dL 9 3   ALBUMIN g/dL 4 4   TOTAL BILIRUBIN mg/dL 1 37*   ALK PHOS U/L 53   ALT U/L 24   AST U/L 27   GLUCOSE RANDOM mg/dL 96                       Lines/Drains:  Invasive Devices     Peripheral Intravenous Line  Duration           Peripheral IV 06/20/23 Right Antecubital 2 days                Telemetry:        * I Have Reviewed All Lab Data Listed Above  Imaging:     Results for orders placed during the hospital encounter of 06/10/23    XR chest 1 view portable    Narrative  CHEST    INDICATION:   Confusion  COMPARISON: CXR 10/23/2020  EXAM PERFORMED/VIEWS:  XR CHEST PORTABLE  FINDINGS:    Cardiomediastinal silhouette normal  Moderate hiatal hernia  Lungs clear  No effusion or pneumothorax  Upper abdomen normal  Bones normal for age  Impression  No acute cardiopulmonary disease  Workstation performed: [id number]    No results found for this or any previous visit        *I have reviewed all imaging reports listed above      Recent Cultures (last 7 days):           Last 24 Hours Medication List:   Current Facility-Administered Medications Medication Dose Route Frequency Provider Last Rate   • acetaminophen  650 mg Oral Q6H PRN Kunal Johnston MD     • calcium carbonate  1,000 mg Oral Daily PRN Kunal Johnston MD     • cyanocobalamin  1,000 mcg Oral Daily Kunal Johnston MD     • docusate sodium  100 mg Oral BID Kunal Johnston MD     • enoxaparin  40 mg Subcutaneous Daily Kunal Johnston MD     • ferrous sulfate  325 mg Oral Daily With Breakfast Kunal Johnston MD     • fluticasone  1 spray Nasal Daily Kunal Johnston MD     • latanoprost  1 drop Both Eyes HS Kunal Johnston MD     • levothyroxine  125 mcg Oral QAM Kunal Johnston MD     • OLANZapine  2 5 mg Intramuscular Q6H PRN Alexis Villanueva PA-C     • ondansetron  4 mg Intravenous Q6H PRN Kunal Johnston MD     • pravastatin  40 mg Oral Daily With Lowell Billings MD     • QUEtiapine  12 5 mg Oral BID Kunal Johnston MD     • senna  1 tablet Oral Daily Kunal Johnston MD          Today, Patient Was Seen By: Kunal Johnston MD    ** Please Note: Dictation voice to text software may have been used in the creation of this document   **

## 2023-06-23 LAB
ALBUMIN SERPL BCP-MCNC: 4.3 G/DL (ref 3.5–5)
ALP SERPL-CCNC: 49 U/L (ref 34–104)
ALT SERPL W P-5'-P-CCNC: 22 U/L (ref 7–52)
ANION GAP SERPL CALCULATED.3IONS-SCNC: 6 MMOL/L
AST SERPL W P-5'-P-CCNC: 27 U/L (ref 13–39)
BASOPHILS # BLD AUTO: 0.06 THOUSANDS/ÂΜL (ref 0–0.1)
BASOPHILS NFR BLD AUTO: 1 % (ref 0–1)
BILIRUB SERPL-MCNC: 1.12 MG/DL (ref 0.2–1)
BUN SERPL-MCNC: 23 MG/DL (ref 5–25)
CALCIUM SERPL-MCNC: 9.4 MG/DL (ref 8.4–10.2)
CHLORIDE SERPL-SCNC: 107 MMOL/L (ref 96–108)
CO2 SERPL-SCNC: 30 MMOL/L (ref 21–32)
CREAT SERPL-MCNC: 1.33 MG/DL (ref 0.6–1.3)
EOSINOPHIL # BLD AUTO: 0.18 THOUSAND/ÂΜL (ref 0–0.61)
EOSINOPHIL NFR BLD AUTO: 3 % (ref 0–6)
ERYTHROCYTE [DISTWIDTH] IN BLOOD BY AUTOMATED COUNT: 15.3 % (ref 11.6–15.1)
GFR SERPL CREATININE-BSD FRML MDRD: 52 ML/MIN/1.73SQ M
GLUCOSE SERPL-MCNC: 108 MG/DL (ref 65–140)
HCT VFR BLD AUTO: 43.3 % (ref 36.5–49.3)
HGB BLD-MCNC: 14 G/DL (ref 12–17)
IMM GRANULOCYTES # BLD AUTO: 0.01 THOUSAND/UL (ref 0–0.2)
IMM GRANULOCYTES NFR BLD AUTO: 0 % (ref 0–2)
LYMPHOCYTES # BLD AUTO: 2.01 THOUSANDS/ÂΜL (ref 0.6–4.47)
LYMPHOCYTES NFR BLD AUTO: 35 % (ref 14–44)
MCH RBC QN AUTO: 32.6 PG (ref 26.8–34.3)
MCHC RBC AUTO-ENTMCNC: 32.3 G/DL (ref 31.4–37.4)
MCV RBC AUTO: 101 FL (ref 82–98)
MONOCYTES # BLD AUTO: 0.56 THOUSAND/ÂΜL (ref 0.17–1.22)
MONOCYTES NFR BLD AUTO: 10 % (ref 4–12)
NEUTROPHILS # BLD AUTO: 2.95 THOUSANDS/ÂΜL (ref 1.85–7.62)
NEUTS SEG NFR BLD AUTO: 51 % (ref 43–75)
NRBC BLD AUTO-RTO: 0 /100 WBCS
PLATELET # BLD AUTO: 198 THOUSANDS/UL (ref 149–390)
PMV BLD AUTO: 10.5 FL (ref 8.9–12.7)
POTASSIUM SERPL-SCNC: 4.2 MMOL/L (ref 3.5–5.3)
PROT SERPL-MCNC: 7.1 G/DL (ref 6.4–8.4)
RBC # BLD AUTO: 4.3 MILLION/UL (ref 3.88–5.62)
SODIUM SERPL-SCNC: 143 MMOL/L (ref 135–147)
WBC # BLD AUTO: 5.77 THOUSAND/UL (ref 4.31–10.16)

## 2023-06-23 PROCEDURE — 99232 SBSQ HOSP IP/OBS MODERATE 35: CPT | Performed by: HOSPITALIST

## 2023-06-23 PROCEDURE — 85025 COMPLETE CBC W/AUTO DIFF WBC: CPT | Performed by: HOSPITALIST

## 2023-06-23 PROCEDURE — 80053 COMPREHEN METABOLIC PANEL: CPT | Performed by: HOSPITALIST

## 2023-06-23 PROCEDURE — 92610 EVALUATE SWALLOWING FUNCTION: CPT

## 2023-06-23 RX ORDER — OLANZAPINE 10 MG/1
2.5 INJECTION, POWDER, LYOPHILIZED, FOR SOLUTION INTRAMUSCULAR
Status: DISCONTINUED | OUTPATIENT
Start: 2023-06-23 | End: 2023-06-30 | Stop reason: HOSPADM

## 2023-06-23 RX ORDER — QUETIAPINE FUMARATE 25 MG/1
25 TABLET, FILM COATED ORAL 2 TIMES DAILY
Status: DISCONTINUED | OUTPATIENT
Start: 2023-06-23 | End: 2023-06-26

## 2023-06-23 RX ORDER — OLANZAPINE 10 MG/1
5 INJECTION, POWDER, LYOPHILIZED, FOR SOLUTION INTRAMUSCULAR ONCE
Status: DISCONTINUED | OUTPATIENT
Start: 2023-06-23 | End: 2023-06-26

## 2023-06-23 RX ADMIN — QUETIAPINE FUMARATE 25 MG: 25 TABLET ORAL at 18:13

## 2023-06-23 RX ADMIN — LATANOPROST 1 DROP: 50 SOLUTION OPHTHALMIC at 20:55

## 2023-06-23 RX ADMIN — FLUTICASONE PROPIONATE 1 SPRAY: 50 SPRAY, METERED NASAL at 08:53

## 2023-06-23 RX ADMIN — OLANZAPINE 2.5 MG: 10 INJECTION, POWDER, FOR SOLUTION INTRAMUSCULAR at 06:28

## 2023-06-23 RX ADMIN — PRAVASTATIN SODIUM 40 MG: 40 TABLET ORAL at 18:13

## 2023-06-23 RX ADMIN — OLANZAPINE 2.5 MG: 10 INJECTION, POWDER, LYOPHILIZED, FOR SOLUTION INTRAMUSCULAR at 11:04

## 2023-06-23 RX ADMIN — DOCUSATE SODIUM 100 MG: 100 CAPSULE, LIQUID FILLED ORAL at 18:13

## 2023-06-23 RX ADMIN — ENOXAPARIN SODIUM 40 MG: 100 INJECTION SUBCUTANEOUS at 08:52

## 2023-06-23 NOTE — ASSESSMENT & PLAN NOTE
Patient returns with similar issues as prior admission with disruptive behaviors and family unable to care for patient anymore  Patient noted to have during that admission unremarkable work-up, suspected progressive dementia with low MoCA score  MRI at that time demonstrated old stroke but no acute findings  Case management involved for placement  Neuropsych consulted - patient without capacity to make medical decisions  Pt will benefit from outpatient geriatrics or neuro evaluation  Psychiatry consulted d/t agitation  Seroquel uptitrated further   Wean restraints as tolerated

## 2023-06-23 NOTE — PHYSICAL THERAPY NOTE
PHYSICAL THERAPY CANCELLATION NOTE    Patient Name: Homar Stone  [id number] Date: 6/23/2023 06/23/23 1148   Note Type   Note type Cancelled Session   Cancel Reasons Medical status   Additional Comments PT orders received, chart review performed  Spoke to [address] [place] [address], RN Norbert   Pt was agitated and unable to be redirected this AM  Finally calm and asleep, will hold on PT eval at this time  (of note, pt was previously ambulatory A x 1 to bathroom w/ yesterday's RN)       Consuelo Stewart, PT, DPT

## 2023-06-23 NOTE — CASE MANAGEMENT
Case Management Progress Note    Patient name Nasir Hughes  Location /-23 MRN [id number]  : 1950 Date 2023       LOS (days): 3  Geometric Mean LOS (GMLOS) (days): 4 70  Days to GMLOS:1 5        OBJECTIVE:        Current admission status: Inpatient  Preferred Pharmacy:   [address] [facility]  [address] [facility]  [address]  Phone: [phone] [facility]ax: [facility] [place] [id number] Jey Shi, [address] [place], [place] [address]  Phone: [phone] Fax: [phone]    Primary Care Provider: Pat Sevilla DO    Primary Insurance: MEDICARE  Secondary Insurance: AARP    PROGRESS NOTE: Cm spoke with pts BERNARDA Reddy regarding dc progress  She has a tour with [facility] on Monday  The tour with Jesús Jennings went well yesterday  Pt remains in restraints  Once family makes a decision about a facility need to work on keep off restraints or IMs

## 2023-06-23 NOTE — QUICK NOTE
"Pt found yelling \"help\" outside of his room  Pt refusing to return to room despite attempt at redirection by multiple staff  Pt attempt to swing and kick at staff  IM zyprexa given and pt placed back in bed and in 4 point soft restraints   Seroquel increased to 25mg BID    "

## 2023-06-23 NOTE — PLAN OF CARE
Problem: MOBILITY - ADULT  Goal: Maintain or return to baseline ADL function  Description: INTERVENTIONS:  -  Assess patient's ability to carry out ADLs; assess patient's baseline for ADL function and identify physical deficits which impact ability to perform ADLs (bathing, care of mouth/teeth, toileting, grooming, dressing, etc )  - Assess/evaluate cause of self-care deficits   - Assess range of motion  - Assess patient's mobility; develop plan if impaired  - Assess patient's need for assistive devices and provide as appropriate  - Encourage maximum independence but intervene and supervise when necessary  - Involve family in performance of ADLs  - Assess for home care needs following discharge   - Consider OT consult to assist with ADL evaluation and planning for discharge  - Provide patient education as appropriate  Outcome: Progressing  Goal: Maintains/Returns to pre admission functional level  Description: INTERVENTIONS:  - Perform BMAT or MOVE assessment daily    - Set and communicate daily mobility goal to care team and patient/family/caregiver  - Collaborate with rehabilitation services on mobility goals if consulted  - Perform Range of Motion 2 times a day  - Reposition patient every 2 hours    - Dangle patient 2 times a day  - Stand patient 2 times a day  - Ambulate patient 2 times a day  - Out of bed to chair 2 times a day   - Out of bed for meals 2 times a day  - Out of bed for toileting  - Record patient progress and toleration of activity level   Outcome: Progressing     Problem: Potential for Falls  Goal: Patient will remain free of falls  Description: INTERVENTIONS:  - Educate patient/family on patient safety including physical limitations  - Instruct patient to call for assistance with activity   - Consult OT/PT to assist with strengthening/mobility   - Keep Call bell within reach  - Keep bed low and locked with side rails adjusted as appropriate  - Keep care items and personal belongings within reach  - Initiate and maintain comfort rounds  - Make Fall Risk Sign visible to staff  - Offer Toileting every 2 Hours, in advance of need  - Initiate/Maintain bed alarm  - Obtain necessary fall risk management equipment  - Apply yellow socks and bracelet for high fall risk patients  - Consider moving patient to room near nurses station  Outcome: Progressing     Problem: Prexisting or High Potential for Compromised Skin Integrity  Goal: Skin integrity is maintained or improved  Description: INTERVENTIONS:  - Identify patients at risk for skin breakdown  - Assess and monitor skin integrity  - Assess and monitor nutrition and hydration status  - Monitor labs   - Assess for incontinence   - Turn and reposition patient  - Assist with mobility/ambulation  - Relieve pressure over bony prominences  - Avoid friction and shearing  - Provide appropriate hygiene as needed including keeping skin clean and dry  - Evaluate need for skin moisturizer/barrier cream  - Collaborate with interdisciplinary team   - Patient/family teaching  - Consider wound care consult   Outcome: Progressing     Problem: SAFETY,RESTRAINT: NV/NON-SELF DESTRUCTIVE BEHAVIOR  Goal: Remains free of harm/injury (restraint for non violent/non self-detsructive behavior)  Description: INTERVENTIONS:  - Instruct patient/family regarding restraint use   - Assess and monitor physiologic and psychological status   - Provide interventions and comfort measures to meet assessed patient needs   - Identify and implement measures to help patient regain control  - Assess readiness for release of restraint   Outcome: Progressing  Goal: Returns to optimal restraint-free functioning  Description: INTERVENTIONS:  - Assess the patient's behavior and symptoms that indicate continued need for restraint  - Identify and implement measures to help patient regain control  - Assess readiness for release of restraint   Outcome: Progressing

## 2023-06-23 NOTE — PROGRESS NOTES
[place]  Progress Note  Name: Rendall Merlin  MRN: [id number]  Unit/Bed#: -01 I Date of Admission: 2023   Date of Service: 2023 I Hospital Day: 3    Assessment/Plan   Memory loss  Assessment & Plan  Patient returns with similar issues as prior admission with disruptive behaviors and family unable to care for patient anymore  Patient noted to have during that admission unremarkable work-up, suspected progressive dementia with low MoCA score  MRI at that time demonstrated old stroke but no acute findings  Case management involved for placement  Neuropsych consulted - patient without capacity to make medical decisions  Pt will benefit from outpatient geriatrics or neuro evaluation  Psychiatry consulted d/t agitation  Seroquel uptitrated further  Wean restraints as tolerated    History of B12 deficiency  Assessment & Plan  Cont b12    HLD (hyperlipidemia)  Assessment & Plan  statin    Hypothyroidism  Assessment & Plan  Continue with Synthroid replacement therapy            VTE  Prophylaxis:   Pharmacologic: in place  Mechanical VTE Prophylaxis in Place: Yes    Patient Centered Rounds: I have performed bedside rounds with nursing staff today  Discussions with Specialists or Other Care Team Provider: case management    Education and Discussions with Family / Patient: pt      Current Length of Stay: 3 day(s)    Current Patient Status: Inpatient        Code Status: Level 1 - Full Code    Discharge Plan: Pt will require continued inpatient hospitalization  Subjective:     Pt required zyprexa d/t disruptive behaviors  Now sedated    Patient is seen and examined at bedside  Objective:     Vitals:   Temp (24hrs), Av 4 °F (36 3 °C), Min:97 2 °F (36 2 °C), Max:98 °F (36 7 °C)    Temp:  [97 2 °F (36 2 °C)-98 °F (36 7 °C)] 98 °F (36 7 °C)  HR:  [66-88] 66  Resp:  [17-18] 17  BP: ()/(65-96) 143/93  SpO2:  [85 %-97 %] 94 %  Body mass index is 28 19 kg/m²  Input and Output Summary (last 24 hours): Intake/Output Summary (Last 24 hours) at 6/23/2023 0831  Last data filed at 6/22/2023 1500  Gross per 24 hour   Intake 600 ml   Output 150 ml   Net 450 ml       Physical Exam:       GEN: No acute distress, comfortable  HEEENT: No JVD, PERRLA, no scleral icterus  RESP: Lungs clear to auscultation bilaterally  CV: RRR, +s1/s2   ABD: SOFT NON TENDER, POSITIVE BOWEL SOUNDS, NO DISTENTION  PSYCH: CALM  NEURO: sedated  SKIN: NO RASH  EXTREM: NO EDEMA    Additional Data:     Labs:    Results from last 7 days   Lab Units 06/23/23  0441   WBC Thousand/uL 5 77   HEMOGLOBIN g/dL 14 0   HEMATOCRIT % 43 3   PLATELETS Thousands/uL 198   NEUTROS PCT % 51   LYMPHS PCT % 35   MONOS PCT % 10   EOS PCT % 3     Results from last 7 days   Lab Units 06/23/23  0441   SODIUM mmol/L 143   POTASSIUM mmol/L 4 2   CHLORIDE mmol/L 107   CO2 mmol/L 30   BUN mg/dL 23   CREATININE mg/dL 1 33*   ANION GAP mmol/L 6   CALCIUM mg/dL 9 4   ALBUMIN g/dL 4 3   TOTAL BILIRUBIN mg/dL 1 12*   ALK PHOS U/L 49   ALT U/L 22   AST U/L 27   GLUCOSE RANDOM mg/dL 108                       Lines/Drains:  Invasive Devices     Peripheral Intravenous Line  Duration           Peripheral IV 06/20/23 Right Antecubital 3 days                Telemetry:        * I Have Reviewed All Lab Data Listed Above  Imaging:     Results for orders placed during the hospital encounter of 06/10/23    XR chest 1 view portable    Narrative  CHEST    INDICATION:   Confusion  COMPARISON: CXR 10/23/2020  EXAM PERFORMED/VIEWS:  XR CHEST PORTABLE  FINDINGS:    Cardiomediastinal silhouette normal  Moderate hiatal hernia  Lungs clear  No effusion or pneumothorax  Upper abdomen normal  Bones normal for age  Impression  No acute cardiopulmonary disease  Workstation performed: [id number]    No results found for this or any previous visit        *I have reviewed all imaging reports listed above      Recent Cultures (last 7 days):           Last 24 Hours Medication List:   Current Facility-Administered Medications   Medication Dose Route Frequency Provider Last Rate   • acetaminophen  650 mg Oral Q6H PRN Sondra Councilman, MD     • calcium carbonate  1,000 mg Oral Daily PRN Sondra Councilman, MD     • cyanocobalamin  1,000 mcg Oral Daily Sondra Councilman, MD     • docusate sodium  100 mg Oral BID Sondra Councilman, MD     • enoxaparin  40 mg Subcutaneous Daily Sondra Councilman, MD     • ferrous sulfate  325 mg Oral Daily With Breakfast Sondra Councilman, MD     • fluticasone  1 spray Nasal Daily Sondra Councilman, MD     • latanoprost  1 drop Both Eyes HS Sondra Councilman, MD     • levothyroxine  125 mcg Oral QAM Sondra Councilman, MD     • OLANZapine  2 5 mg Intramuscular Q6H PRN Kevon Collier PA-C     • ondansetron  4 mg Intravenous Q6H PRN Sondra Councilman, MD     • pravastatin  40 mg Oral Daily With Kerry Diaz MD     • QUEtiapine  25 mg Oral BID Shelley Owens PA-C     • senna  1 tablet Oral Daily Sondra Councilman, MD          Today, Patient Was Seen By: Sondra Councilman, MD    ** Please Note: Dictation voice to text software may have been used in the creation of this document   **

## 2023-06-23 NOTE — SPEECH THERAPY NOTE
Speech Language/Pathology    Speech-Language Pathology Bedside Swallow Evaluation      Patient Name: Parish Almendarez    [id number] Date: 6/23/2023     Problem List  Active Problems:    Hypothyroidism    HLD (hyperlipidemia)    History of B12 deficiency    Memory loss      Past Medical History  Past Medical History:   Diagnosis Date   • Disease of thyroid gland    • Hyperlipidemia    • Hypertension        Past Surgical History  Past Surgical History:   Procedure Laterality Date   • HERNIA REPAIR     • REPLACEMENT TOTAL KNEE BILATERAL         Summary   Evaluation process begun  Pt yelling out and unable to be redirected despite max attempts  One tsp puree retrieved and transferred  No overt s/s aspiration  All other PO attempts spit out by patient w/ increasing agitation  S/w RN and physician, pt reportedly tolerating PO a few days ago when less agitated  Plan for close supervision w/ continuation of diet as tolerated  ST to f/u as able/apropriate  Risk/s for Aspiration: Age, AMS, at least mild risk      Recommended Diet: continue current diet as tolerated, full feed/supervision    Recommended Form of Meds: As tolerated   Aspiration precautions and swallowing strategies: upright posture, only feed when fully alert, slow rate of feeding and small bites/sips  Other Recommendations: Continue frequent oral care, contact ST if unable to tolerate current diet         Current Medical Status  Pt is a 74 y o  male who presented to [facility] with  PMH of HTN, HLD, hypothyroid presents to ED with increased confusion and bizarre behavior per his daughter in law, whom he has been living with since his recent d/c  Urinating outside of the toilet, saying strange things  No fall or trauma  No fevers  Was complaining of left shoulder pain  He states that is ongoing for 4 months or so  No known injury or trauma  No CP/SOB  No abd pain  No urinary sx  No fevers   Family has made sure he has been getting his meds appropriately  Eating/drinking fine  Pt has no complaints and is calm and cooperative at this time  No etoh use  No focal weakness, but daughter in law did note slurred speech earlier  No facial droop       Recent admission, d/c 6/13  Was admitted for encephalopathy  Was advised to seek long term care, not safe to live by himself any longer    Current Precautions: Allergies:  No known food allergies    Past medical history:  Please see H&P for details    Special Studies:  CT head 6/20: No acute intracranial abnormality is seen      Other findings as above  Social/Education/Vocational Hx:  Pt lives with family    Swallow Information   Current Risks for Dysphagia & Aspiration: AMS  Current Symptoms/Concerns: pt's DIL reports dysphagia   Current Diet: regular diet and thin liquids   Baseline Diet: regular diet and thin liquids      Baseline Assessment   Behavior/Cognition: alert and cobfused, agitated   Speech/Language Status: not able to to follow commands and limited verbal output  Patient Positioning: upright in bed  Pain Status/Interventions/Response to Interventions:  No report of or nonverbal indications of pain  Swallow Mechanism Exam  Unable to formally assess 2/2 inability to follow commands or be redirected  No gross asymmetry of weakness noted  Consistencies Assessed and Performance   Consistencies Administered: attempted puree and thin     Oral Stage:   Min PO acceptance  Adequate labial seal for retrieval, prompt transfer x1  All other material spit out  Pharyngeal Stage:   Swallow Mechanics:  Swallowing initiation appeared prompt  Laryngeal rise was palpated and judged to be within functional limits  No coughing, throat clearing, change in vocal quality or respiratory status noted today  Esophageal Concerns: none reported    Strategies and Efficacy: -     Summary and Recommendations (see above)    Results Reviewed with: patient, RN and MD     Treatment Recommended:  Yes Frequency of treatment: As able/appropriate     Patient Stated Goal: none stated     Dysphagia LTG  -Patient will demonstrate safe and effective oral intake (without overt s/s significant oral/pharyngeal dysphagia including s/s penetration or aspiration) for the highest appropriate diet level             Speech Therapy Prognosis   Prognosis: Guarded    Prognosis Considerations: age, medical status, prior medical history and cognitive status

## 2023-06-24 LAB
ALBUMIN SERPL BCP-MCNC: 3.9 G/DL (ref 3.5–5)
ALP SERPL-CCNC: 50 U/L (ref 34–104)
ALT SERPL W P-5'-P-CCNC: 15 U/L (ref 7–52)
ANION GAP SERPL CALCULATED.3IONS-SCNC: 9 MMOL/L
AST SERPL W P-5'-P-CCNC: 24 U/L (ref 13–39)
BASOPHILS # BLD AUTO: 0.05 THOUSANDS/ÂΜL (ref 0–0.1)
BASOPHILS NFR BLD AUTO: 1 % (ref 0–1)
BILIRUB SERPL-MCNC: 1.35 MG/DL (ref 0.2–1)
BUN SERPL-MCNC: 20 MG/DL (ref 5–25)
CALCIUM SERPL-MCNC: 8.5 MG/DL (ref 8.4–10.2)
CHLORIDE SERPL-SCNC: 107 MMOL/L (ref 96–108)
CO2 SERPL-SCNC: 24 MMOL/L (ref 21–32)
CREAT SERPL-MCNC: 1.17 MG/DL (ref 0.6–1.3)
EOSINOPHIL # BLD AUTO: 0.17 THOUSAND/ÂΜL (ref 0–0.61)
EOSINOPHIL NFR BLD AUTO: 4 % (ref 0–6)
ERYTHROCYTE [DISTWIDTH] IN BLOOD BY AUTOMATED COUNT: 14.9 % (ref 11.6–15.1)
GFR SERPL CREATININE-BSD FRML MDRD: 61 ML/MIN/1.73SQ M
GLUCOSE SERPL-MCNC: 86 MG/DL (ref 65–140)
HCT VFR BLD AUTO: 40.6 % (ref 36.5–49.3)
HGB BLD-MCNC: 13 G/DL (ref 12–17)
IMM GRANULOCYTES # BLD AUTO: 0.01 THOUSAND/UL (ref 0–0.2)
IMM GRANULOCYTES NFR BLD AUTO: 0 % (ref 0–2)
LYMPHOCYTES # BLD AUTO: 1.4 THOUSANDS/ÂΜL (ref 0.6–4.47)
LYMPHOCYTES NFR BLD AUTO: 29 % (ref 14–44)
MCH RBC QN AUTO: 32.7 PG (ref 26.8–34.3)
MCHC RBC AUTO-ENTMCNC: 32 G/DL (ref 31.4–37.4)
MCV RBC AUTO: 102 FL (ref 82–98)
MONOCYTES # BLD AUTO: 0.55 THOUSAND/ÂΜL (ref 0.17–1.22)
MONOCYTES NFR BLD AUTO: 12 % (ref 4–12)
NEUTROPHILS # BLD AUTO: 2.6 THOUSANDS/ÂΜL (ref 1.85–7.62)
NEUTS SEG NFR BLD AUTO: 54 % (ref 43–75)
NRBC BLD AUTO-RTO: 0 /100 WBCS
PLATELET # BLD AUTO: 176 THOUSANDS/UL (ref 149–390)
PMV BLD AUTO: 10.9 FL (ref 8.9–12.7)
POTASSIUM SERPL-SCNC: 3.6 MMOL/L (ref 3.5–5.3)
PROT SERPL-MCNC: 6.6 G/DL (ref 6.4–8.4)
RBC # BLD AUTO: 3.97 MILLION/UL (ref 3.88–5.62)
SODIUM SERPL-SCNC: 140 MMOL/L (ref 135–147)
WBC # BLD AUTO: 4.78 THOUSAND/UL (ref 4.31–10.16)

## 2023-06-24 PROCEDURE — 85025 COMPLETE CBC W/AUTO DIFF WBC: CPT | Performed by: HOSPITALIST

## 2023-06-24 PROCEDURE — 99232 SBSQ HOSP IP/OBS MODERATE 35: CPT | Performed by: HOSPITALIST

## 2023-06-24 PROCEDURE — 80053 COMPREHEN METABOLIC PANEL: CPT | Performed by: HOSPITALIST

## 2023-06-24 RX ADMIN — QUETIAPINE FUMARATE 25 MG: 25 TABLET ORAL at 09:42

## 2023-06-24 RX ADMIN — ENOXAPARIN SODIUM 40 MG: 100 INJECTION SUBCUTANEOUS at 09:42

## 2023-06-24 RX ADMIN — FERROUS SULFATE TAB 325 MG (65 MG ELEMENTAL FE) 325 MG: 325 (65 FE) TAB at 09:42

## 2023-06-24 RX ADMIN — SENNOSIDES 8.6 MG: 8.6 TABLET, FILM COATED ORAL at 09:43

## 2023-06-24 RX ADMIN — CYANOCOBALAMIN TAB 500 MCG 1000 MCG: 500 TAB at 09:43

## 2023-06-24 RX ADMIN — DOCUSATE SODIUM 100 MG: 100 CAPSULE, LIQUID FILLED ORAL at 18:23

## 2023-06-24 RX ADMIN — FLUTICASONE PROPIONATE 1 SPRAY: 50 SPRAY, METERED NASAL at 09:44

## 2023-06-24 RX ADMIN — LEVOTHYROXINE SODIUM 125 MCG: 25 TABLET ORAL at 09:42

## 2023-06-24 RX ADMIN — DOCUSATE SODIUM 100 MG: 100 CAPSULE, LIQUID FILLED ORAL at 09:43

## 2023-06-24 RX ADMIN — PRAVASTATIN SODIUM 40 MG: 40 TABLET ORAL at 18:23

## 2023-06-24 RX ADMIN — QUETIAPINE FUMARATE 25 MG: 25 TABLET ORAL at 18:23

## 2023-06-24 RX ADMIN — LATANOPROST 1 DROP: 50 SOLUTION OPHTHALMIC at 21:13

## 2023-06-24 NOTE — ASSESSMENT & PLAN NOTE
Patient returns with similar issues as prior admission with disruptive behaviors and family unable to care for patient anymore  Patient noted to have during that admission unremarkable work-up, suspected progressive dementia with low MoCA score  MRI at that time demonstrated old stroke but no acute findings  Case management involved for placement  Neuropsych consulted - patient without capacity to make medical decisions  Pt will benefit from outpatient geriatrics or neuro evaluation  Psychiatry consulted d/t agitation  Seroquel uptitrated further  Wean restraints as tolerated  6/24 behaviors appear improved

## 2023-06-24 NOTE — PROGRESS NOTES
[facility]  Progress Note  Name: Latrell Palmer  MRN: [id number]  Unit/Bed#: -01 I Date of Admission: 2023   Date of Service: 2023 I Hospital Day: 4    Assessment/Plan   Memory loss  Assessment & Plan  Patient returns with similar issues as prior admission with disruptive behaviors and family unable to care for patient anymore  Patient noted to have during that admission unremarkable work-up, suspected progressive dementia with low MoCA score  MRI at that time demonstrated old stroke but no acute findings  Case management involved for placement  Neuropsych consulted - patient without capacity to make medical decisions  Pt will benefit from outpatient geriatrics or neuro evaluation  Psychiatry consulted d/t agitation  Seroquel uptitrated further  Wean restraints as tolerated   behaviors appear improved  History of B12 deficiency  Assessment & Plan  Cont b12    HLD (hyperlipidemia)  Assessment & Plan  statin    Hypothyroidism  Assessment & Plan  Continue with Synthroid replacement therapy              VTE  Prophylaxis:   Pharmacologic: in place  Mechanical VTE Prophylaxis in Place: Yes    Patient Centered Rounds: I have performed bedside rounds with nursing staff today  Discussions with Specialists or Other Care Team Provider: case management    Education and Discussions with Family / Patient: pt      Current Length of Stay: 4 day(s)    Current Patient Status: Inpatient        Code Status: Level 1 - Full Code    Discharge Plan: Pt will require continued inpatient hospitalization  Subjective:      Pt with better behaviors   No medical complaints    Patient is seen and examined at bedside  All other ROS are negative      Objective:     Vitals:   Temp (24hrs), Av 5 °F (36 4 °C), Min:97 1 °F (36 2 °C), Max:97 9 °F (36 6 °C)    Temp:  [97 1 °F (36 2 °C)-97 9 °F (36 6 °C)] 97 2 °F (36 2 °C)  HR:  [62-72] 63  Resp:  [16-22] 22  BP: (138-147)/() 144/93  SpO2:  [93 %-97 %] 93 %  Body mass index is 28 78 kg/m²  Input and Output Summary (last 24 hours): Intake/Output Summary (Last 24 hours) at 6/24/2023 1010  Last data filed at 6/24/2023 0752  Gross per 24 hour   Intake 143 ml   Output 641 ml   Net -498 ml       Physical Exam:       GEN: No acute distress, comfortable, restrained  HEEENT: No JVD, PERRLA, no scleral icterus  RESP: Lungs clear to auscultation bilaterally  CV: RRR, +s1/s2   ABD: SOFT NON TENDER, POSITIVE BOWEL SOUNDS, NO DISTENTION  PSYCH: CALM, apparent dementia  NEURO: Mentation baseline, NO FOCAL DEFICITS  SKIN: NO RASH  EXTREM: NO EDEMA    Additional Data:     Labs:    Results from last 7 days   Lab Units 06/24/23  0233   WBC Thousand/uL 4 78   HEMOGLOBIN g/dL 13 0   HEMATOCRIT % 40 6   PLATELETS Thousands/uL 176   NEUTROS PCT % 54   LYMPHS PCT % 29   MONOS PCT % 12   EOS PCT % 4     Results from last 7 days   Lab Units 06/24/23  0233   SODIUM mmol/L 140   POTASSIUM mmol/L 3 6   CHLORIDE mmol/L 107   CO2 mmol/L 24   BUN mg/dL 20   CREATININE mg/dL 1 17   ANION GAP mmol/L 9   CALCIUM mg/dL 8 5   ALBUMIN g/dL 3 9   TOTAL BILIRUBIN mg/dL 1 35*   ALK PHOS U/L 50   ALT U/L 15   AST U/L 24   GLUCOSE RANDOM mg/dL 86                       Lines/Drains:  Invasive Devices     Peripheral Intravenous Line  Duration           Peripheral IV 06/24/23 Left;Ventral (anterior) Forearm <1 day                Telemetry:        * I Have Reviewed All Lab Data Listed Above  Imaging:     Results for orders placed during the hospital encounter of 06/10/23    XR chest 1 view portable    Narrative  CHEST    INDICATION:   Confusion  COMPARISON: CXR 10/23/2020  EXAM PERFORMED/VIEWS:  XR CHEST PORTABLE  FINDINGS:    Cardiomediastinal silhouette normal  Moderate hiatal hernia  Lungs clear  No effusion or pneumothorax  Upper abdomen normal  Bones normal for age  Impression  No acute cardiopulmonary disease            Workstation performed: [id number]    No results found for this or any previous visit  *I have reviewed all imaging reports listed above      Recent Cultures (last 7 days):           Last 24 Hours Medication List:   Current Facility-Administered Medications   Medication Dose Route Frequency Provider Last Rate   • acetaminophen  650 mg Oral Q6H PRN Melissa Swain MD     • calcium carbonate  1,000 mg Oral Daily PRN Melissa Swain MD     • cyanocobalamin  1,000 mcg Oral Daily Melissa Swain MD     • docusate sodium  100 mg Oral BID Melissa Swain MD     • enoxaparin  40 mg Subcutaneous Daily Melissa Swain MD     • ferrous sulfate  325 mg Oral Daily With Breakfast Melissa Swain MD     • fluticasone  1 spray Nasal Daily Melissa Swain MD     • latanoprost  1 drop Both Eyes HS Melissa Swain MD     • levothyroxine  125 mcg Oral QAM Melissa Swain MD     • OLANZapine  2 5 mg Intramuscular Q3H PRN Melissa Swain MD     • OLANZapine  5 mg Intramuscular Once Melissa Swain MD     • ondansetron  4 mg Intravenous Q6H PRN Melissa Swain MD     • pravastatin  40 mg Oral Daily With Gil Treviño MD     • QUEtiapine  25 mg Oral BID Aileen Wyatt PA-C     • senna  1 tablet Oral Daily Melissa Swain MD          Today, Patient Was Seen By: Melissa Swain MD    ** Please Note: Dictation voice to text software may have been used in the creation of this document   **

## 2023-06-25 LAB
ALBUMIN SERPL BCP-MCNC: 4.5 G/DL (ref 3.5–5)
ALP SERPL-CCNC: 58 U/L (ref 34–104)
ALT SERPL W P-5'-P-CCNC: 17 U/L (ref 7–52)
ANION GAP SERPL CALCULATED.3IONS-SCNC: 11 MMOL/L
AST SERPL W P-5'-P-CCNC: 25 U/L (ref 13–39)
BASOPHILS # BLD AUTO: 0.04 THOUSANDS/ÂΜL (ref 0–0.1)
BASOPHILS NFR BLD AUTO: 1 % (ref 0–1)
BILIRUB SERPL-MCNC: 1.62 MG/DL (ref 0.2–1)
BUN SERPL-MCNC: 19 MG/DL (ref 5–25)
CALCIUM SERPL-MCNC: 9.2 MG/DL (ref 8.4–10.2)
CHLORIDE SERPL-SCNC: 106 MMOL/L (ref 96–108)
CO2 SERPL-SCNC: 25 MMOL/L (ref 21–32)
CREAT SERPL-MCNC: 1.09 MG/DL (ref 0.6–1.3)
EOSINOPHIL # BLD AUTO: 0.07 THOUSAND/ÂΜL (ref 0–0.61)
EOSINOPHIL NFR BLD AUTO: 1 % (ref 0–6)
ERYTHROCYTE [DISTWIDTH] IN BLOOD BY AUTOMATED COUNT: 14.7 % (ref 11.6–15.1)
GFR SERPL CREATININE-BSD FRML MDRD: 66 ML/MIN/1.73SQ M
GLUCOSE SERPL-MCNC: 102 MG/DL (ref 65–140)
HCT VFR BLD AUTO: 41.2 % (ref 36.5–49.3)
HGB BLD-MCNC: 13.3 G/DL (ref 12–17)
IMM GRANULOCYTES # BLD AUTO: 0.01 THOUSAND/UL (ref 0–0.2)
IMM GRANULOCYTES NFR BLD AUTO: 0 % (ref 0–2)
LYMPHOCYTES # BLD AUTO: 1.06 THOUSANDS/ÂΜL (ref 0.6–4.47)
LYMPHOCYTES NFR BLD AUTO: 20 % (ref 14–44)
MCH RBC QN AUTO: 32.7 PG (ref 26.8–34.3)
MCHC RBC AUTO-ENTMCNC: 32.3 G/DL (ref 31.4–37.4)
MCV RBC AUTO: 101 FL (ref 82–98)
MONOCYTES # BLD AUTO: 0.59 THOUSAND/ÂΜL (ref 0.17–1.22)
MONOCYTES NFR BLD AUTO: 11 % (ref 4–12)
NEUTROPHILS # BLD AUTO: 3.59 THOUSANDS/ÂΜL (ref 1.85–7.62)
NEUTS SEG NFR BLD AUTO: 67 % (ref 43–75)
NRBC BLD AUTO-RTO: 0 /100 WBCS
PLATELET # BLD AUTO: 192 THOUSANDS/UL (ref 149–390)
PMV BLD AUTO: 10.2 FL (ref 8.9–12.7)
POTASSIUM SERPL-SCNC: 3.8 MMOL/L (ref 3.5–5.3)
PROT SERPL-MCNC: 7.6 G/DL (ref 6.4–8.4)
RBC # BLD AUTO: 4.07 MILLION/UL (ref 3.88–5.62)
SODIUM SERPL-SCNC: 142 MMOL/L (ref 135–147)
WBC # BLD AUTO: 5.36 THOUSAND/UL (ref 4.31–10.16)

## 2023-06-25 PROCEDURE — 85025 COMPLETE CBC W/AUTO DIFF WBC: CPT | Performed by: HOSPITALIST

## 2023-06-25 PROCEDURE — 99232 SBSQ HOSP IP/OBS MODERATE 35: CPT | Performed by: HOSPITALIST

## 2023-06-25 PROCEDURE — 80053 COMPREHEN METABOLIC PANEL: CPT | Performed by: HOSPITALIST

## 2023-06-25 RX ADMIN — LEVOTHYROXINE SODIUM 125 MCG: 25 TABLET ORAL at 10:01

## 2023-06-25 RX ADMIN — ENOXAPARIN SODIUM 40 MG: 100 INJECTION SUBCUTANEOUS at 10:06

## 2023-06-25 RX ADMIN — QUETIAPINE FUMARATE 25 MG: 25 TABLET ORAL at 10:02

## 2023-06-25 RX ADMIN — PRAVASTATIN SODIUM 40 MG: 40 TABLET ORAL at 17:10

## 2023-06-25 RX ADMIN — SENNOSIDES 8.6 MG: 8.6 TABLET, FILM COATED ORAL at 10:03

## 2023-06-25 RX ADMIN — LATANOPROST 1 DROP: 50 SOLUTION OPHTHALMIC at 23:00

## 2023-06-25 RX ADMIN — FERROUS SULFATE TAB 325 MG (65 MG ELEMENTAL FE) 325 MG: 325 (65 FE) TAB at 10:02

## 2023-06-25 RX ADMIN — CYANOCOBALAMIN TAB 500 MCG 1000 MCG: 500 TAB at 10:01

## 2023-06-25 RX ADMIN — QUETIAPINE FUMARATE 25 MG: 25 TABLET ORAL at 17:10

## 2023-06-25 RX ADMIN — DOCUSATE SODIUM 100 MG: 100 CAPSULE, LIQUID FILLED ORAL at 10:03

## 2023-06-25 RX ADMIN — DOCUSATE SODIUM 100 MG: 100 CAPSULE, LIQUID FILLED ORAL at 17:10

## 2023-06-25 NOTE — PROGRESS NOTES
[facility]  Progress Note  Name: Jeannie Escalante  MRN: [id number]  Unit/Bed#: -01 I Date of Admission: 2023   Date of Service: 2023 I Hospital Day: 5    Assessment/Plan   Memory loss  Assessment & Plan  Patient returns with similar issues as prior admission with disruptive behaviors and family unable to care for patient anymore  Patient noted to have during that admission unremarkable work-up, suspected progressive dementia with low MoCA score  MRI at that time demonstrated old stroke but no acute findings  Case management involved for placement  Neuropsych consulted - patient without capacity to make medical decisions  Pt will benefit from outpatient geriatrics or neuro evaluation  Psychiatry consulted d/t agitation  Seroquel uptitrated further  Wean restraints as tolerated   behaviors appear improved but  disruptive again  History of B12 deficiency  Assessment & Plan  Cont b12    HLD (hyperlipidemia)  Assessment & Plan  statin    Hypothyroidism  Assessment & Plan  Continue with Synthroid replacement therapy              VTE  Prophylaxis:   Pharmacologic: in place  Mechanical VTE Prophylaxis in Place: Yes    Patient Centered Rounds: I have performed bedside rounds with nursing staff today  Discussions with Specialists or Other Care Team Provider: case management         Current Length of Stay: 5 day(s)    Current Patient Status: Inpatient        Code Status: Level 1 - Full Code    Discharge Plan: Pt will require continued inpatient hospitalization  Subjective:   Pt was again disruptive overnight and into today    Patient is seen and examined at bedside  All other ROS are negative      Objective:     Vitals:   Temp (24hrs), Av 3 °F (36 8 °C), Min:98 1 °F (36 7 °C), Max:98 5 °F (36 9 °C)    Temp:  [98 1 °F (36 7 °C)-98 5 °F (36 9 °C)] 98 5 °F (36 9 °C)  HR:  [73-81] 73  Resp:  [20-22] 22  BP: (120-123)/(86-87) 123/87  SpO2:  [95 %-97 %] 97 %  Body mass index is 27 54 kg/m²  Input and Output Summary (last 24 hours): Intake/Output Summary (Last 24 hours) at 6/25/2023 0810  Last data filed at 6/24/2023 1833  Gross per 24 hour   Intake 390 ml   Output --   Net 390 ml       Physical Exam:       GEN: No acute distress, comfortable, in restraints  HEEENT: No JVD, PERRLA, no scleral icterus  RESP: Lungs clear to auscultation bilaterally  CV: RRR, +s1/s2   ABD: SOFT NON TENDER, POSITIVE BOWEL SOUNDS, NO DISTENTION  PSYCH: agitated, apparent dementia  NEURO: Mentation baseline, NO FOCAL DEFICITS  SKIN: NO RASH  EXTREM: NO EDEMA    Additional Data:     Labs:    Results from last 7 days   Lab Units 06/25/23  0444   WBC Thousand/uL 5 36   HEMOGLOBIN g/dL 13 3   HEMATOCRIT % 41 2   PLATELETS Thousands/uL 192   NEUTROS PCT % 67   LYMPHS PCT % 20   MONOS PCT % 11   EOS PCT % 1     Results from last 7 days   Lab Units 06/25/23  0444   SODIUM mmol/L 142   POTASSIUM mmol/L 3 8   CHLORIDE mmol/L 106   CO2 mmol/L 25   BUN mg/dL 19   CREATININE mg/dL 1 09   ANION GAP mmol/L 11   CALCIUM mg/dL 9 2   ALBUMIN g/dL 4 5   TOTAL BILIRUBIN mg/dL 1 62*   ALK PHOS U/L 58   ALT U/L 17   AST U/L 25   GLUCOSE RANDOM mg/dL 102                       Lines/Drains:  Invasive Devices     Peripheral Intravenous Line  Duration           Peripheral IV 06/24/23 Right;Ventral (anterior) Forearm <1 day                Telemetry:        * I Have Reviewed All Lab Data Listed Above  Imaging:     Results for orders placed during the hospital encounter of 06/10/23    XR chest 1 view portable    Narrative  CHEST    INDICATION:   Confusion  COMPARISON: CXR 10/23/2020  EXAM PERFORMED/VIEWS:  XR CHEST PORTABLE  FINDINGS:    Cardiomediastinal silhouette normal  Moderate hiatal hernia  Lungs clear  No effusion or pneumothorax  Upper abdomen normal  Bones normal for age  Impression  No acute cardiopulmonary disease            Workstation performed: [id number]    No results found for this or any previous visit  *I have reviewed all imaging reports listed above      Recent Cultures (last 7 days):           Last 24 Hours Medication List:   Current Facility-Administered Medications   Medication Dose Route Frequency Provider Last Rate   • acetaminophen  650 mg Oral Q6H PRN Fozia Kelsey MD     • calcium carbonate  1,000 mg Oral Daily PRN Fozia Kelsey MD     • cyanocobalamin  1,000 mcg Oral Daily Fozia Kelsey MD     • docusate sodium  100 mg Oral BID Fozia Kelsey MD     • enoxaparin  40 mg Subcutaneous Daily Fozia Kelsey MD     • ferrous sulfate  325 mg Oral Daily With Breakfast Fozia Kelsey MD     • fluticasone  1 spray Nasal Daily Fozia Kelsey MD     • latanoprost  1 drop Both Eyes HS Fozia Kelsey MD     • levothyroxine  125 mcg Oral QAM Fozia Kelsey MD     • OLANZapine  2 5 mg Intramuscular Q3H PRN Fozia Kelsey MD     • OLANZapine  5 mg Intramuscular Once Fozia Kelsey MD     • ondansetron  4 mg Intravenous Q6H PRN Fozia Kelsey MD     • pravastatin  40 mg Oral Daily With Charu Santos MD     • QUEtiapine  25 mg Oral BID Odessa Cifuentes PA-C     • senna  1 tablet Oral Daily Fozia Kelsey MD          Today, Patient Was Seen By: Fozia Kelsey MD    ** Please Note: Dictation voice to text software may have been used in the creation of this document   **

## 2023-06-25 NOTE — PLAN OF CARE
Problem: Potential for Falls  Goal: Patient will remain free of falls  Description: INTERVENTIONS:  - Educate patient/family on patient safety including physical limitations  - Instruct patient to call for assistance with activity   - Consult OT/PT to assist with strengthening/mobility   - Keep Call bell within reach  - Keep bed low and locked with side rails adjusted as appropriate  - Keep care items and personal belongings within reach  - Initiate and maintain comfort rounds  - Make Fall Risk Sign visible to staff  - Offer Toileting every 2 Hours, in advance of need  - Initiate/Maintain bed alarm  - Obtain necessary fall risk management equipment  - Apply yellow socks and bracelet for high fall risk patients  - Consider moving patient to room near nurses station  6/24/2023 2355 by Yusef Hugo, RN  Outcome: Progressing     Problem: MOBILITY - ADULT  Goal: Maintain or return to baseline ADL function  Description: INTERVENTIONS:  -  Assess patient's ability to carry out ADLs; assess patient's baseline for ADL function and identify physical deficits which impact ability to perform ADLs (bathing, care of mouth/teeth, toileting, grooming, dressing, etc )  - Assess/evaluate cause of self-care deficits   - Assess range of motion  - Assess patient's mobility; develop plan if impaired  - Assess patient's need for assistive devices and provide as appropriate  - Encourage maximum independence but intervene and supervise when necessary  - Involve family in performance of ADLs  - Assess for home care needs following discharge   - Consider OT consult to assist with ADL evaluation and planning for discharge  - Provide patient education as appropriate  6/24/2023 2355 by Yusef Hugo, RN  Outcome: Progressing  Goal: Maintains/Returns to pre admission functional level  Description: INTERVENTIONS:  - Perform BMAT or MOVE assessment daily    - Set and communicate daily mobility goal to care team and patient/family/caregiver     - Collaborate with rehabilitation services on mobility goals if consulted  - Perform Range of Motion 2 times a day  - Reposition patient every 2 hours    - Dangle patient 2 times a day  - Stand patient 2 times a day  - Ambulate patient 2 times a day  - Out of bed to chair 2 times a day   - Out of bed for meals 2 times a day  - Out of bed for toileting  - Record patient progress and toleration of activity level   6/24/2023 2355 by Juan C Cast RN  Outcome: Progressing     Problem: Prexisting or High Potential for Compromised Skin Integrity  Goal: Skin integrity is maintained or improved  Description: INTERVENTIONS:  - Identify patients at risk for skin breakdown  - Assess and monitor skin integrity  - Assess and monitor nutrition and hydration status  - Monitor labs   - Assess for incontinence   - Turn and reposition patient  - Assist with mobility/ambulation  - Relieve pressure over bony prominences  - Avoid friction and shearing  - Provide appropriate hygiene as needed including keeping skin clean and dry  - Evaluate need for skin moisturizer/barrier cream  - Collaborate with interdisciplinary team   - Patient/family teaching  - Consider wound care consult   6/24/2023 2355 by Juan C Cast RN  Outcome: Progressing       Problem: SAFETY,RESTRAINT: NV/NON-SELF DESTRUCTIVE BEHAVIOR  Goal: Remains free of harm/injury (restraint for non violent/non self-detsructive behavior)  Description: INTERVENTIONS:  - Instruct patient/family regarding restraint use   - Assess and monitor physiologic and psychological status   - Provide interventions and comfort measures to meet assessed patient needs   - Identify and implement measures to help patient regain control  - Assess readiness for release of restraint   6/24/2023 2355 by Juan C Cast RN  Outcome: Progressing    Goal: Returns to optimal restraint-free functioning  Description: INTERVENTIONS:  - Assess the patient's behavior and symptoms that indicate continued need for restraint  - Identify and implement measures to help patient regain control  - Assess readiness for release of restraint   6/24/2023 2355 by Mychal Shea RN  Outcome: Progressing

## 2023-06-25 NOTE — PLAN OF CARE
Problem: Potential for Falls  Goal: Patient will remain free of falls  Description: INTERVENTIONS:  - Educate patient/family on patient safety including physical limitations  - Instruct patient to call for assistance with activity   - Consult OT/PT to assist with strengthening/mobility   - Keep Call bell within reach  - Keep bed low and locked with side rails adjusted as appropriate  - Keep care items and personal belongings within reach  - Initiate and maintain comfort rounds  - Make Fall Risk Sign visible to staff  - Offer Toileting every 2 Hours, in advance of need  - Initiate/Maintain bed alarm  - Obtain necessary fall risk management equipment  - Apply yellow socks and bracelet for high fall risk patients  - Consider moving patient to room near nurses station  Outcome: Progressing     Problem: SAFETY,RESTRAINT: NV/NON-SELF DESTRUCTIVE BEHAVIOR  Goal: Remains free of harm/injury (restraint for non violent/non self-detsructive behavior)  Description: INTERVENTIONS:  - Instruct patient/family regarding restraint use   - Assess and monitor physiologic and psychological status   - Provide interventions and comfort measures to meet assessed patient needs   - Identify and implement measures to help patient regain control  - Assess readiness for release of restraint   Outcome: Progressing  Goal: Returns to optimal restraint-free functioning  Description: INTERVENTIONS:  - Assess the patient's behavior and symptoms that indicate continued need for restraint  - Identify and implement measures to help patient regain control  - Assess readiness for release of restraint   Outcome: Progressing

## 2023-06-25 NOTE — ASSESSMENT & PLAN NOTE
Patient returns with similar issues as prior admission with disruptive behaviors and family unable to care for patient anymore  Patient noted to have during that admission unremarkable work-up, suspected progressive dementia with low MoCA score  MRI at that time demonstrated old stroke but no acute findings  Case management involved for placement  Neuropsych consulted - patient without capacity to make medical decisions  Pt will benefit from outpatient geriatrics or neuro evaluation  Psychiatry consulted d/t agitation  Seroquel uptitrated further  Wean restraints as tolerated  6/24 behaviors appear improved but 6/25 disruptive again

## 2023-06-26 PROBLEM — R41.81 AGE-RELATED COGNITIVE DECLINE: Status: ACTIVE | Noted: 2023-06-11

## 2023-06-26 PROBLEM — Z75.1: Status: ACTIVE | Noted: 2023-06-26

## 2023-06-26 LAB
ALBUMIN SERPL BCP-MCNC: 4.1 G/DL (ref 3.5–5)
ALP SERPL-CCNC: 53 U/L (ref 34–104)
ALT SERPL W P-5'-P-CCNC: 14 U/L (ref 7–52)
ANION GAP SERPL CALCULATED.3IONS-SCNC: 9 MMOL/L
AST SERPL W P-5'-P-CCNC: 25 U/L (ref 13–39)
BASOPHILS # BLD AUTO: 0.05 THOUSANDS/ÂΜL (ref 0–0.1)
BASOPHILS NFR BLD AUTO: 1 % (ref 0–1)
BILIRUB SERPL-MCNC: 1.66 MG/DL (ref 0.2–1)
BUN SERPL-MCNC: 17 MG/DL (ref 5–25)
CALCIUM SERPL-MCNC: 8.7 MG/DL (ref 8.4–10.2)
CHLORIDE SERPL-SCNC: 108 MMOL/L (ref 96–108)
CO2 SERPL-SCNC: 24 MMOL/L (ref 21–32)
CREAT SERPL-MCNC: 1.11 MG/DL (ref 0.6–1.3)
EOSINOPHIL # BLD AUTO: 0.2 THOUSAND/ÂΜL (ref 0–0.61)
EOSINOPHIL NFR BLD AUTO: 4 % (ref 0–6)
ERYTHROCYTE [DISTWIDTH] IN BLOOD BY AUTOMATED COUNT: 15 % (ref 11.6–15.1)
GFR SERPL CREATININE-BSD FRML MDRD: 65 ML/MIN/1.73SQ M
GLUCOSE SERPL-MCNC: 89 MG/DL (ref 65–140)
HCT VFR BLD AUTO: 43.4 % (ref 36.5–49.3)
HGB BLD-MCNC: 13.3 G/DL (ref 12–17)
IMM GRANULOCYTES # BLD AUTO: 0.01 THOUSAND/UL (ref 0–0.2)
IMM GRANULOCYTES NFR BLD AUTO: 0 % (ref 0–2)
LYMPHOCYTES # BLD AUTO: 1.7 THOUSANDS/ÂΜL (ref 0.6–4.47)
LYMPHOCYTES NFR BLD AUTO: 36 % (ref 14–44)
MCH RBC QN AUTO: 32.8 PG (ref 26.8–34.3)
MCHC RBC AUTO-ENTMCNC: 30.6 G/DL (ref 31.4–37.4)
MCV RBC AUTO: 107 FL (ref 82–98)
MONOCYTES # BLD AUTO: 0.51 THOUSAND/ÂΜL (ref 0.17–1.22)
MONOCYTES NFR BLD AUTO: 11 % (ref 4–12)
NEUTROPHILS # BLD AUTO: 2.31 THOUSANDS/ÂΜL (ref 1.85–7.62)
NEUTS SEG NFR BLD AUTO: 48 % (ref 43–75)
NRBC BLD AUTO-RTO: 0 /100 WBCS
PLATELET # BLD AUTO: 172 THOUSANDS/UL (ref 149–390)
PMV BLD AUTO: 10.3 FL (ref 8.9–12.7)
POTASSIUM SERPL-SCNC: 3.8 MMOL/L (ref 3.5–5.3)
PROT SERPL-MCNC: 7 G/DL (ref 6.4–8.4)
RBC # BLD AUTO: 4.06 MILLION/UL (ref 3.88–5.62)
SODIUM SERPL-SCNC: 141 MMOL/L (ref 135–147)
WBC # BLD AUTO: 4.78 THOUSAND/UL (ref 4.31–10.16)

## 2023-06-26 PROCEDURE — 80053 COMPREHEN METABOLIC PANEL: CPT | Performed by: HOSPITALIST

## 2023-06-26 PROCEDURE — 99232 SBSQ HOSP IP/OBS MODERATE 35: CPT | Performed by: INTERNAL MEDICINE

## 2023-06-26 PROCEDURE — 92526 ORAL FUNCTION THERAPY: CPT

## 2023-06-26 PROCEDURE — 85025 COMPLETE CBC W/AUTO DIFF WBC: CPT | Performed by: HOSPITALIST

## 2023-06-26 RX ORDER — QUETIAPINE FUMARATE 25 MG/1
50 TABLET, FILM COATED ORAL EVERY 12 HOURS SCHEDULED
Status: DISCONTINUED | OUTPATIENT
Start: 2023-06-26 | End: 2023-06-30 | Stop reason: HOSPADM

## 2023-06-26 RX ADMIN — DOCUSATE SODIUM 100 MG: 100 CAPSULE, LIQUID FILLED ORAL at 10:09

## 2023-06-26 RX ADMIN — SENNOSIDES 8.6 MG: 8.6 TABLET, FILM COATED ORAL at 10:08

## 2023-06-26 RX ADMIN — ENOXAPARIN SODIUM 40 MG: 100 INJECTION SUBCUTANEOUS at 10:08

## 2023-06-26 RX ADMIN — QUETIAPINE FUMARATE 50 MG: 25 TABLET ORAL at 21:29

## 2023-06-26 RX ADMIN — PRAVASTATIN SODIUM 40 MG: 40 TABLET ORAL at 17:30

## 2023-06-26 RX ADMIN — LATANOPROST 1 DROP: 50 SOLUTION OPHTHALMIC at 21:55

## 2023-06-26 RX ADMIN — QUETIAPINE FUMARATE 25 MG: 25 TABLET ORAL at 10:09

## 2023-06-26 RX ADMIN — FERROUS SULFATE TAB 325 MG (65 MG ELEMENTAL FE) 325 MG: 325 (65 FE) TAB at 10:09

## 2023-06-26 RX ADMIN — LEVOTHYROXINE SODIUM 125 MCG: 25 TABLET ORAL at 10:08

## 2023-06-26 RX ADMIN — DOCUSATE SODIUM 100 MG: 100 CAPSULE, LIQUID FILLED ORAL at 17:30

## 2023-06-26 RX ADMIN — CYANOCOBALAMIN TAB 500 MCG 1000 MCG: 500 TAB at 10:08

## 2023-06-26 NOTE — PROGRESS NOTES
[facility]  Progress Note  Name: Claudia Burt  MRN: [id number]  Unit/Bed#: -01 I Date of Admission: 6/20/2023   Date of Service: 6/26/2023 I Hospital Day: 6    Assessment/Plan   * Encounter for person awaiting admission to adequate facility elsewhere  Assessment & Plan  Background: History of hypothyroidism hypertension hyperlipidemia recently here read presents to the hospital for the same unable to care for self  · Remains agitated at times  Further increase in quetiapine  · Awaiting LTC placement    Age-related cognitive decline  Assessment & Plan  · Recent hospitalization for similar including unable to care for self with suspicion for progressive dementia  · Seen by psychiatry this admission with recommendation to check TSH and vitamin D  Will order both for the morning as most recent TSH was 138  · Due to agitation, further increase quetiapine to 50 mg twice daily  · Disposition: For LTC/memory care when bed available    History of B12 deficiency  Assessment & Plan  · Continue cyanocobalamin    HLD (hyperlipidemia)  Assessment & Plan  · Unclear benefit of pravastatin  We will continue for now  Hypothyroidism  Assessment & Plan  · Most recent TSH was 138  Will recheck  · Continue levothyroxine    Lab Results   Component Value Date    [id number] 138 454 (H) 06/11/2023    [id number] 135 020 (H) 06/10/2023     VTE Pharmacologic Prophylaxis: VTE Score: 5 High Risk (Score >/= 5) - Pharmacological DVT Prophylaxis Ordered: enoxaparin (Lovenox)  Sequential Compression Devices Ordered  Patient Centered Rounds: I have performed bedside rounds with nursing staff today  Discussions with Specialists or Other Care Team Provider: Case management    Education and Discussions with Family / Patient: Updated  (daughter in law) via phone  Time Spent for Care:    This time was spent on one or more of the following: performing physical exam; counseling and "coordination of care; obtaining or reviewing history; documenting in the medical record; reviewing/ordering tests, medications or procedures; communicating with other healthcare professionals and discussing with patient's family/caregivers  Current Length of Stay: 6 day(s)  Current Patient Status: Inpatient   Certification Statement: The patient will continue to require additional inpatient hospital stay due to Adjusting mood stabilizers and will need LTC placement  Discharge Plan: Anticipate discharge in 24-48 hrs to LTC placement    Code Status: Level 1 - Full Code      Subjective:   Patient seen and examined  Tried to get up last night again  Easily redirectable  Remains on 1: 1 observation    Objective:   Vitals: Blood pressure 127/87, pulse 75, temperature (!) 96 6 °F (35 9 °C), resp  rate 19, height 5' 9\" (1 753 m), weight 88 kg (194 lb 0 1 oz), SpO2 97 %  Intake/Output Summary (Last 24 hours) at 6/26/2023 1338  Last data filed at 6/26/2023 1259  Gross per 24 hour   Intake 120 ml   Output 225 ml   Net -105 ml       Physical Exam  Vitals reviewed  Constitutional:       General: He is not in acute distress  HENT:      Head: Atraumatic  Cardiovascular:      Rate and Rhythm: Regular rhythm  Pulmonary:      Effort: Pulmonary effort is normal       Breath sounds: Decreased breath sounds present  No wheezing  Abdominal:      General: Bowel sounds are normal       Palpations: Abdomen is soft  Tenderness: There is no abdominal tenderness  There is no rebound  Musculoskeletal:         General: No swelling or tenderness  Skin:     General: Skin is warm  Neurological:      Mental Status: He is alert  Mental status is at baseline  He is disoriented  Cranial Nerves: No cranial nerve deficit         Additional Data:   Labs:  Results from last 7 days   Lab Units 06/26/23  0253 06/25/23  0444 06/24/23  0233   WBC Thousand/uL 4 78 5 36 4 78   HEMOGLOBIN g/dL 13 3 13 3 13 0   PLATELETS " Thousands/uL 172 192 176   MCV fL 107* 101* 102*     Results from last 7 days   Lab Units 06/26/23  0253 06/25/23  0444 06/24/23  0233   SODIUM mmol/L 141 142 140   POTASSIUM mmol/L 3 8 3 8 3 6   CHLORIDE mmol/L 108 106 107   CO2 mmol/L 24 25 24   ANION GAP mmol/L 9 11 9   BUN mg/dL 17 19 20   CREATININE mg/dL 1 11 1 09 1 17   CALCIUM mg/dL 8 7 9 2 8 5   ALBUMIN g/dL 4 1 4 5 3 9   TOTAL BILIRUBIN mg/dL 1 66* 1 62* 1 35*   ALK PHOS U/L 53 58 50   ALT U/L 14 17 15   AST U/L 25 25 24   EGFR ml/min/1 73sq m 65 66 61   GLUCOSE RANDOM mg/dL 89 102 86             Results from last 7 days   Lab Units 06/20/23  0859 06/20/23  0644 06/20/23  0455   HS TNI 0HR ng/L  --   --  6   HS TNI 2HR ng/L  --  5  --    HS TNI 4HR ng/L 6  --   --                           * I Have Reviewed All Lab Data Listed Above  Cultures:                   Lines/Drains:  Invasive Devices     Peripheral Intravenous Line  Duration           Peripheral IV 06/24/23 Right;Ventral (anterior) Forearm 1 day              Telemetry:      Imaging:  Imaging Reports Reviewed Today Include:   XR shoulder 2+ vw right    Result Date: 6/20/2023  Impression: No acute osseous abnormality  Workstation performed: [id number]     CT head without contrast    Result Date: 6/20/2023  Impression: No acute intracranial abnormality is seen  Other findings as above   Workstation performed: [id number]       Scheduled Meds:  Current Facility-Administered Medications   Medication Dose Route Frequency Provider Last Rate   • acetaminophen  650 mg Oral Q6H PRN Danielle Case MD     • calcium carbonate  1,000 mg Oral Daily PRN Danielle Case MD     • cyanocobalamin  1,000 mcg Oral Daily Danielle Case MD     • docusate sodium  100 mg Oral BID Danielle Case MD     • enoxaparin  40 mg Subcutaneous Daily Danielle Case MD     • ferrous sulfate  325 mg Oral Daily With Breakfast Danielle Case MD     • fluticasone  1 spray Nasal Daily Colleen Travis Rico Lara MD     • latanoprost  1 drop Both Eyes HS Quinn Germain MD     • levothyroxine  125 mcg Oral QAM Quinn Germain MD     • OLANZapine  2 5 mg Intramuscular Q3H PRN Quinn Germain MD     • ondansetron  4 mg Intravenous Q6H PRN Quinn Germain MD     • pravastatin  40 mg Oral Daily With Steve Norman MD     • QUEtiapine  25 mg Oral BID Raman Izaguirre PA-C     • senna  1 tablet Oral Daily Quinn Germain MD         Today, Patient Was Seen By: Coty Ayala DO    ** Please Note: Dictation voice to text software may have been used in the creation of this document   **

## 2023-06-26 NOTE — SPEECH THERAPY NOTE
"Speech Language/Pathology    Speech/Language Pathology Progress Note    Patient Name: Fercho Meraz  YFSPQ'P Date: 6/26/2023     Problem List  Principal Problem:    Encounter for person awaiting admission to adequate facility elsewhere  Active Problems:    Hypothyroidism    HLD (hyperlipidemia)    History of B12 deficiency    Age-related cognitive decline       Past Medical History  Past Medical History:   Diagnosis Date   • Disease of thyroid gland    • Hyperlipidemia    • Hypertension         Past Surgical History  Past Surgical History:   Procedure Laterality Date   • HERNIA REPAIR     • REPLACEMENT TOTAL KNEE BILATERAL           Subjective:  Pt awake and alert, agreeable to PO trials  \"I was just looking for a snack\"      Current Diet:  Regular/thin     Objective:  Pt seen for dx dysphagia tx to assess PO trials  Pt assessed w/ regular texture snack hard pretzels and [place] [employer] cookies, and thin liquids  Pt is easily distractible though redirects to PO  Mastication and oral organization is timely  No significant oral residue  No difficulty retrieving via straw  Swallows suspected timely  No overt s/s aspiration across trials today  1:1 present in room who has been present for several meals, she reports no observed difficulty or s/s aspiration  Assessment:  Pt able to tolerate regular texture snack and thin liquids without overt s/s oropharyngeal difficulties and/or aspiration       Plan/Recommendations:  Regular w/ thin   Frequent/thorough oral care  No further IP ST needs, please re-consult if medically necessary    "

## 2023-06-26 NOTE — ASSESSMENT & PLAN NOTE
· Most recent TSH was 138    Will recheck  · Continue levothyroxine    Lab Results   Component Value Date    [id number] 138 454 (H) 06/11/2023    [id number] 135 020 (H) 06/10/2023

## 2023-06-26 NOTE — ASSESSMENT & PLAN NOTE
Background: History of hypothyroidism hypertension hyperlipidemia recently here read presents to the hospital for the same unable to care for self  · Remains agitated at times  Further increase in quetiapine    · Awaiting LTC placement

## 2023-06-26 NOTE — ASSESSMENT & PLAN NOTE
· Recent hospitalization for similar including unable to care for self with suspicion for progressive dementia  · Seen by psychiatry this admission with recommendation to check TSH and vitamin D    Will order both for the morning as most recent TSH was 138  · Due to agitation, further increase quetiapine to 50 mg twice daily  · Disposition: For LTC/memory care when bed available

## 2023-06-27 LAB
25(OH)D3 SERPL-MCNC: 24.7 NG/ML (ref 30–100)
T4 FREE SERPL-MCNC: 0.46 NG/DL (ref 0.61–1.12)
TSH SERPL DL<=0.05 MIU/L-ACNC: 87.99 UIU/ML (ref 0.45–4.5)

## 2023-06-27 PROCEDURE — 99232 SBSQ HOSP IP/OBS MODERATE 35: CPT

## 2023-06-27 PROCEDURE — 82306 VITAMIN D 25 HYDROXY: CPT | Performed by: PHYSICIAN ASSISTANT

## 2023-06-27 PROCEDURE — 84443 ASSAY THYROID STIM HORMONE: CPT | Performed by: PHYSICIAN ASSISTANT

## 2023-06-27 PROCEDURE — 84439 ASSAY OF FREE THYROXINE: CPT | Performed by: PHYSICIAN ASSISTANT

## 2023-06-27 PROCEDURE — G0425 INPT/ED TELECONSULT30: HCPCS | Performed by: GENERAL PRACTICE

## 2023-06-27 RX ORDER — DIVALPROEX SODIUM 250 MG/1
250 TABLET, EXTENDED RELEASE ORAL
Status: DISCONTINUED | OUTPATIENT
Start: 2023-06-27 | End: 2023-06-28

## 2023-06-27 RX ORDER — LEVOTHYROXINE SODIUM 0.07 MG/1
150 TABLET ORAL
Status: DISCONTINUED | OUTPATIENT
Start: 2023-06-28 | End: 2023-06-30 | Stop reason: HOSPADM

## 2023-06-27 RX ADMIN — DIVALPROEX SODIUM 250 MG: 250 TABLET, FILM COATED, EXTENDED RELEASE ORAL at 22:50

## 2023-06-27 RX ADMIN — QUETIAPINE FUMARATE 50 MG: 25 TABLET ORAL at 20:13

## 2023-06-27 RX ADMIN — QUETIAPINE FUMARATE 50 MG: 25 TABLET ORAL at 10:23

## 2023-06-27 RX ADMIN — LATANOPROST 1 DROP: 50 SOLUTION OPHTHALMIC at 22:50

## 2023-06-27 RX ADMIN — LEVOTHYROXINE SODIUM 125 MCG: 25 TABLET ORAL at 10:33

## 2023-06-27 RX ADMIN — CYANOCOBALAMIN TAB 500 MCG 1000 MCG: 500 TAB at 10:23

## 2023-06-27 RX ADMIN — PRAVASTATIN SODIUM 40 MG: 40 TABLET ORAL at 16:20

## 2023-06-27 RX ADMIN — ENOXAPARIN SODIUM 40 MG: 100 INJECTION SUBCUTANEOUS at 10:22

## 2023-06-27 RX ADMIN — FERROUS SULFATE TAB 325 MG (65 MG ELEMENTAL FE) 325 MG: 325 (65 FE) TAB at 10:23

## 2023-06-27 RX ADMIN — ACETAMINOPHEN 650 MG: 325 TABLET ORAL at 20:13

## 2023-06-27 NOTE — CONSULTS
TeleConsultation - [facility]   Anette Ndiaye 74 y o  male MRN: [id number]  Unit/Bed#: -01 Encounter: [id number]        REQUIRED DOCUMENTATION:     1  This service was provided via Telemedicine  2  Provider located at [facility]   3  TeleMed provider: Prudence Dandy, MD   4  Identify all parties in room with patient during tele consult: Patient   5  Patient was then informed that this was a Telemedicine visit and that the exam was being conducted confidentially over secure lines  My office door was closed  No one else was in the room  Patient acknowledged consent and understanding of privacy and security of the Telemedicine visit, and gave us permission to have the assistant stay in the room in order to assist with the history and to conduct the exam   I informed the patient that I have reviewed their record in Epic and presented the opportunity for them to ask any questions regarding the visit today  The patient agreed to participate  Discussed with Jono Marcus MD    Assessment/Plan     Present on Admission:  • Age-related cognitive decline  • Hypothyroidism  • HLD (hyperlipidemia)  • History of B12 deficiency  • Encounter for person awaiting admission to adequate facility elsewhere    Assessment:     Unspecified Neurocognitive Disorder      Prior consult reviewed, Patient presents with suspected major neurocognitive disorder with behavioral disturbances that has required restraints  Recommend starting Depakote  mg nightly          Treatment Plan:    Planned Medication Changes:    -Start Depakote  mg qhs     Current Medications:     Current Facility-Administered Medications   Medication Dose Route Frequency Provider Last Rate   • acetaminophen  650 mg Oral Q6H PRN Jono Marcus MD     • calcium carbonate  1,000 mg Oral Daily PRN Jono Marcus MD     • cyanocobalamin  1,000 mcg Oral Daily Jono Marcus MD     • docusate sodium  100 mg Oral BID Miryam Friday Jesus Norman MD     • enoxaparin  40 mg Subcutaneous Daily Kunal Johnston MD     • ferrous sulfate  325 mg Oral Daily With Breakfast Zan Bates MD     • fluticasone  1 spray Nasal Daily Kunal Johnston MD     • latanoprost  1 drop Both Eyes HS Kunal Johnston MD     • levothyroxine  125 mcg Oral QAM Kunal Johnston MD     • OLANZapine  2 5 mg Intramuscular Q3H PRN Kunal Johnston MD     • ondansetron  4 mg Intravenous Q6H PRN Kunal Johntson MD     • pravastatin  40 mg Oral Daily With Phani Cottrell MD     • QUEtiapine  50 mg Oral Q12H [facility] Pablo Chang DO     • senna  1 tablet Oral Daily Kunal Johnston MD         Risks / Benefits of Treatment:    Risks, benefits, and possible side effects of medications explained to patient and patient verbalizes understanding  Other treatment modalities recommended as indicated:    · None applicable at this time      Inpatient consult to Psychiatry  Consult performed by: Michael Sabillon MD  Consult ordered by: Jessy Trejo PA-C        Physician Requesting Consult: Kunal Johnston MD  Principal Problem:Encounter for person awaiting admission to adequate facility elsewhere    Reason for Consult: Psych Evaluation       History of Present Illness      Per nursing patient has had some bouts of agitation and has tried to get out of bed and remove his IV  Patient states he is not sure why he is in the hospital and when discussing his memory states that it has been getting better   Patient is a limited historian and majority of information obtained per chart review and limited clinical exam       Psychiatric Review Of Systems:    sleep: no  appetite changes: no  weight changes: no  energy/anergy: no  interest/pleasure/anhedonia: no  somatic symptoms: no  anxiety/panic: no  juan antonio: no  guilty/hopeless: no  self injurious behavior/risky behavior: no    Historical Information     Past Psychiatric History: Psychiatric Hospitalizations:   • No history of past inpatient psychiatric admissions  Outpatient Treatment History:   • Denied  Suicide Attempts:   • None  History of self-harm:   • None  Violence History:   • no  Past Psychiatric medication trials: Seroquel     Substance Abuse History: Denied      Family Psychiatric History: Denied         Social History:    Education: high school diploma/GED  Learning Disabilities: Denied  Marital history: single  Living arrangement, social support: The patient lives in home with significant other  Occupational History: retired  Functioning Relationships: good support system    Other Pertinent History: None    Traumatic History:     Abuse: None  Other Traumatic Events: none    Past Medical History:   Diagnosis Date   • Disease of thyroid gland    • Hyperlipidemia    • Hypertension        Medical Review Of Systems:    Review of Systems    Meds/Allergies     all current active meds have been reviewed  No Known Allergies    Objective     Vital signs in last 24 hours:  Temp:  [97 1 °F (36 2 °C)-98 2 °F (36 8 °C)] 97 4 °F (36 3 °C)  HR:  [75-87] 75  Resp:  [15-16] 16  BP: (134-152)/() 134/86      Intake/Output Summary (Last 24 hours) at 6/27/2023 1719  Last data filed at 6/27/2023 0855  Gross per 24 hour   Intake 360 ml   Output 960 ml   Net -600 ml       Mental Status Evaluation:    Appearance:  bearded   Behavior:  Cooperative   Speech:  normal pitch and normal volume   Mood:  normal   Affect:  normal   Language: naming objects   Thought Process:  disorganized   Associations intact associations   Thought Content:  normal   Perceptual Disturbances: None   Risk Potential: Suicidal Ideations none  Homicidal Ideations none  Potential for Aggression No   Sensorium:  person, place and time/date   Cognition:  recent and remote memory grossly intact   Consciousness:  alert    Attention: attention span and concentration were not age appropriate   Intellect: within normal limits Fund of Knowledge: vocabulary: wnl   Insight:  limited   Judgment: limited   Muscle Strength:  Muscle Tone: normal NFT  normal   Gait/Station: normal gait/station   Motor Activity: no abnormal movements       Lab Results: I have personally reviewed all pertinent laboratory/tests results  Most Recent Labs:   Lab Results   Component Value Date    WBC 4 78 06/26/2023    RBC 4 06 06/26/2023    HGB 13 3 06/26/2023    HCT 43 4 06/26/2023     06/26/2023    RDW 15 0 06/26/2023    NEUTROABS 2 31 06/26/2023    SODIUM 141 06/26/2023    K 3 8 06/26/2023     06/26/2023    CO2 24 06/26/2023    BUN 17 06/26/2023    CREATININE 1 11 06/26/2023    GLUC 89 06/26/2023    CALCIUM 8 7 06/26/2023    AST 25 06/26/2023    ALT 14 06/26/2023    ALKPHOS 53 06/26/2023    TP 7 0 06/26/2023    ALB 4 1 06/26/2023    TBILI 1 66 (H) 06/26/2023    [id number] 87 988 (H) 06/27/2023    FREET4 0 46 (L) 06/27/2023       Imaging Studies: XR shoulder 2+ vw right    Result Date: 6/20/2023  Narrative: RIGHT SHOULDER INDICATION:   pain  COMPARISON:  None VIEWS:  XR SHOULDER 2+ VW RIGHT FINDINGS: There is no acute fracture or dislocation  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  No lytic or blastic osseous lesion  Soft tissues are unremarkable  Impression: No acute osseous abnormality  Workstation performed: [id number]     CT head without contrast    Result Date: 6/20/2023  Narrative: CT BRAIN - WITHOUT CONTRAST INDICATION:   Delirium ams  COMPARISON: MR brain dated 6/12/2023, CT head dated 6/10/2023 TECHNIQUE:  CT examination of the brain was performed  Multiplanar 2D reformatted images were created from the source data  Radiation dose length product (DLP) for this visit:  879 55 mGy-cm     This examination, like all CT scans performed in the [facility], was performed utilizing techniques to minimize radiation dose exposure, including the use of iterative  reconstruction and automated exposure control  IMAGE QUALITY:  Diagnostic  FINDINGS: PARENCHYMA: Decreased attenuation is noted in periventricular and subcortical white matter demonstrating an appearance that is statistically most likely to represent advanced microangiopathic change  Gray-white differentiation appears maintained  No CT signs of acute territorial infarction  No intracranial mass, mass effect or midline shift  No acute parenchymal hemorrhage  Parenchymal atrophy  VENTRICLES AND EXTRA-AXIAL SPACES:  Ventricles and extra-axial CSF spaces are prominent commensurate with the degree of volume loss  No hydrocephalus  No acute extra-axial hemorrhage  VISUALIZED ORBITS: Normal visualized orbits  PARANASAL SINUSES: Mild coastal thickening in the ethmoid sinuses  Visualized paranasal sinuses otherwise appear grossly clear  CALVARIUM AND EXTRACRANIAL SOFT TISSUES:  Normal      Impression: No acute intracranial abnormality is seen  Other findings as above  Workstation performed: [id number]     MRI brain wo contrast    Result Date: 6/12/2023  Narrative: MRI BRAIN WITHOUT CONTRAST INDICATION: brain, cognitive changes, eval stroke, mass or other  COMPARISON:   Brain MRI 3/17/2021 TECHNIQUE:  Multiplanar, multisequence imaging of the brain was performed  IMAGE QUALITY:  Diagnostic  FINDINGS: BRAIN PARENCHYMA: Cerebral atrophy  There is no discrete mass, mass effect or midline shift  There is no intracranial hemorrhage  There is no evidence of acute infarction and diffusion imaging is unremarkable  Grossly stable nonspecific confluent T2/FLAIR hyperintensity involving periventricular and subcortical white matter most consistent with advanced microangiopathy  Stable lacunar infarct in the right temporal lobe (series 4 image 13)  VENTRICLES:  Normal for the patient's age   SELLA AND PITUITARY GLAND:  Normal  ORBITS:  Normal  PARANASAL SINUSES:  Normal  VASCULATURE:  Evaluation of the major intracranial vasculature demonstrates appropriate flow voids  CALVARIUM AND SKULL BASE:  Normal  Minimal bilateral mastoid effusion  EXTRACRANIAL SOFT TISSUES:  Normal      Impression: 1  No acute ischemia  Stable old lacunar infarct in the right temporal lobe  2   Grossly stable nonspecific white matter change suggesting microangiopathy  Workstation performed: [id number]     XR chest 1 view portable    Result Date: 6/10/2023  Narrative: CHEST INDICATION:   Confusion  COMPARISON: CXR 10/23/2020  EXAM PERFORMED/VIEWS:  XR CHEST PORTABLE  FINDINGS: Cardiomediastinal silhouette normal  Moderate hiatal hernia  Lungs clear  No effusion or pneumothorax  Upper abdomen normal  Bones normal for age  Impression: No acute cardiopulmonary disease  Workstation performed: [id number]     CT head without contrast    Result Date: 6/10/2023  Narrative: CT BRAIN - WITHOUT CONTRAST INDICATION:   Confusion  COMPARISON:  None  TECHNIQUE:  CT examination of the brain was performed  Multiplanar 2D reformatted images were created from the source data  Radiation dose length product (DLP) for this visit:  901 74 mGy-cm   This examination, like all CT scans performed in the [facility], was performed utilizing techniques to minimize radiation dose exposure, including the use of iterative  reconstruction and automated exposure control  IMAGE QUALITY:  Diagnostic  FINDINGS: PARENCHYMA: 10/23/2020 VENTRICLES AND EXTRA-AXIAL SPACES:  Normal for the patient's age  VISUALIZED ORBITS: Normal visualized orbits  PARANASAL SINUSES: Normal visualized paranasal sinuses  CALVARIUM AND EXTRACRANIAL SOFT TISSUES:  Normal      Impression: No acute intracranial abnormality   Workstation performed: [id number]     EKG/Pathology/Other Studies:   Lab Results   Component Value Date    VENTRATE 55 06/20/2023    ATRIALRATE 55 06/20/2023    PRINT 214 06/20/2023    QRSDINT 92 06/20/2023    QTINT 414 06/20/2023    QTCINT 396 06/20/2023    PAXIS 57 06/20/2023    QRSAXIS 60 06/20/2023    TWAVEAXIS 9 06/20/2023        Code Status: Level 1 - Full Code  Advance Directive and Living Will:      Power of :    POLST:      Screenings:    1  Nutrition Screening  Nutrition Assessment (completed by Staff): Nutrition  Feeding: Total assist    2  Pain Screening  Pain Screening: Pain Assessment  Pain Assessment Tool: Lee-Baker FACES  Pain Score: 3  Lee-Baker FACES Pain Rating: No hurt  Pain Location/Orientation: Location: Back  Pain Rating: FLACC (Rest) - Face: No particular expression or smile  Pain Rating: FLACC (Rest) - Legs: Normal position or relaxed  Pain Rating: FLACC (Rest) - Activity: Lying quietly, normal position, moves easily  Pain Rating: FLACC (Rest) - Cry: No cry (awake or asleep)  Pain Rating: FLACC (Rest) - Consolability: Content, relaxed  Score: FLACC (Rest): 0  Pain Rating: FLACC (Activity) - Face: No particular expression or smile  Pain Rating: FLACC (Activity) - Legs: Normal position or relaxed  Pain Rating: FLACC (Activity) - Activity: Lying quietly, normal position, moves easily  Pain Rating: FLACC (Activity) - Cry: No cry (awake or asleep)  Pain Rating: FLACC (Activity) - Consolability: Content, relaxed  Score: FLACC (Activity): 0    3  Suicide Screening  ED Crisis Suicide Risk Assessment:        Counseling / Coordination of Care: Total floor / unit time spent today 30 minutes  Greater than 50% of total time was spent with the patient and / or family counseling and / or coordination of care  A description of the counseling / coordination of care: Direct Patient Care, Chart Review, and Documentation

## 2023-06-27 NOTE — ASSESSMENT & PLAN NOTE
Background: History of hypothyroidism hypertension hyperlipidemia recently here read presents to the hospital for the same unable to care for self  · Continue quetiapine (increased due to agitation)  · Continue 1:1, must be weaned off prior to placement  · Awaiting LTC placement

## 2023-06-27 NOTE — PROGRESS NOTES
-- Patient:  -- MRN: [id number]  -- Aidin Request ID: [id number]  -- Level of care reserved: Stephane Price  -- Partner Reserved: Gabriela, [facility], [address] [place] [address] [phone]  -- Clinical needs requested:  -- Geography searched: 10 miles around [phone]  -- Start of Service:  -- Request sent: 10:41am EDT on 6/20/2023 by [employer]  -- Partner reserved: 3:55pm EDT on 6/27/2023 by [employer]  -- Choice list shared: 3:55pm EDT on 6/27/2023 by [employer]

## 2023-06-27 NOTE — CASE MANAGEMENT
Case Management Progress Note    Patient name Daniela Upton  Location /-63 MRN [id number]  : 1950 Date 2023       LOS (days): 7  Geometric Mean LOS (GMLOS) (days): 4 70  Days to GMLOS:-2 5        OBJECTIVE:        Current admission status: Inpatient  Preferred Pharmacy:   [address], [address] [facility]  [address] [facility]  [address]  Phone: [phone] Fax: [phone] [place] [id number] Sakshi Mosquera, [address] [place] [id number]  Phone: [phone] Fax: [phone]    Primary Care Provider: Rupal Rosales DO    Primary Insurance: MEDICARE  Secondary Insurance: AARP    PROGRESS NOTE: Cm spoke with Leonela Paul at [facility]  Pt can dc to the facility once he is off a 1:1 in person, virtual and all restraints for 48 hrs   Leonela Paul will reach out to pts family regarding MA packet and they will assist them with that application

## 2023-06-27 NOTE — CASE MANAGEMENT
Case Management Progress Note    Patient name Sruthi [facility]  Location /-56 MRN [id number]  : 1950 Date 2023       LOS (days): 7  Geometric Mean LOS (GMLOS) (days): 4 70  Days to GMLOS:-2 5        OBJECTIVE:        Current admission status: Inpatient  Preferred Pharmacy:   [address], [address] [facility] [address] [facility]  [address] [facility]  [address]  Phone: [phone] Fax: [address] [place] [id number] Nick Almaraz, [address] [place] [address]  Phone: [phone] Fax: [phone]    Primary Care Provider: Alyssa Dean DO    Primary Insurance: MEDICARE  Secondary Insurance: AARP    PROGRESS NOTE: Cm review of referrals  [facility] has declined pt  Cm spoke with pts Dtr in law  The family is interested in the [address] Memory care   Cm reaching out to [facility]

## 2023-06-27 NOTE — ASSESSMENT & PLAN NOTE
· Most recent TSH was 138  · Repeat TSH 87 9 --> T4 is pending   · Continue levothyroxine 125 mcg daily    Lab Results   Component Value Date    GJG2NZXOYLSC 87 988 (H) 06/27/2023    VUY3MZVJZERM 138 454 (H) 06/11/2023

## 2023-06-27 NOTE — PROGRESS NOTES
[facility]  Progress Note  Name: Freda Boone  MRN: [id number]  Unit/Bed#: -01 I Date of Admission: 6/20/2023   Date of Service: 6/27/2023 I Hospital Day: 7    Assessment/Plan   * Encounter for person awaiting admission to adequate facility elsewhere  Assessment & Plan  Background: History of hypothyroidism hypertension hyperlipidemia recently here read presents to the hospital for the same unable to care for self  · Continue quetiapine (increased due to agitation)  · Continue 1:1, must be weaned off prior to placement  · Awaiting LTC placement    Age-related cognitive decline  Assessment & Plan  · Recent hospitalization for similar including unable to care for self with suspicion for progressive dementia  · Seen by psychiatry this admission with recommendation to check TSH and vitamin D  Will order both for the morning as most recent TSH was 138  · Due to agitation, further increase quetiapine to 50 mg twice daily  · Disposition: For LTC/memory care when bed available    History of B12 deficiency  Assessment & Plan  · Continue cyanocobalamin    HLD (hyperlipidemia)  Assessment & Plan  · Unclear benefit of pravastatin  We will continue for now  Hypothyroidism  Assessment & Plan  · Most recent TSH was 138  · Repeat TSH 87 9 --> T4 is pending   · Continue levothyroxine 125 mcg daily    Lab Results   Component Value Date    [id number] 87 988 (H) 06/27/2023    [id number] 138 454 (H) 06/11/2023              VTE Pharmacologic Prophylaxis: VTE Score: 5 High Risk (Score >/= 5) - Pharmacological DVT Prophylaxis Ordered: enoxaparin (Lovenox)  Sequential Compression Devices Ordered  Patient Centered Rounds: I performed bedside rounds with nursing staff today  Discussions with Specialists or Other Care Team Provider: None     Education and Discussions with Family / Patient: Updated  (daughter in law) via phone      Total Time Spent on Date of Encounter in care of patient: 35 minutes This time was spent on one or more of the following: performing physical exam; counseling and coordination of care; obtaining or reviewing history; documenting in the medical record; reviewing/ordering tests, medications or procedures; communicating with other healthcare professionals and discussing with patient's family/caregivers  Current Length of Stay: 7 day(s)  Current Patient Status: Inpatient   Certification Statement: The patient will continue to require additional inpatient hospital stay due to placement  Discharge Plan: Pending placement    Code Status: Level 1 - Full Code    Subjective:   Patient is disoriented and confused however awake and alert, responds to questions, cooperates with exam  Denies any pain or complaints  Objective:     Vitals:   Temp (24hrs), Av 8 °F (36 6 °C), Min:97 1 °F (36 2 °C), Max:98 2 °F (36 8 °C)    Temp:  [97 1 °F (36 2 °C)-98 2 °F (36 8 °C)] 97 1 °F (36 2 °C)  HR:  [68-87] 87  Resp:  [15-19] 16  BP: (150-156)/() 150/93  SpO2:  [97 %] 97 %  Body mass index is 28 29 kg/m²  Input and Output Summary (last 24 hours): Intake/Output Summary (Last 24 hours) at 2023 1143  Last data filed at 2023 0301  Gross per 24 hour   Intake 480 ml   Output 550 ml   Net -70 ml       Physical Exam:   Physical Exam  Vitals and nursing note reviewed  Constitutional:       General: He is not in acute distress  Appearance: He is well-developed  He is ill-appearing  HENT:      Head: Normocephalic and atraumatic  Eyes:      General:         Right eye: No discharge  Left eye: No discharge  Extraocular Movements: Extraocular movements intact  Conjunctiva/sclera: Conjunctivae normal    Cardiovascular:      Rate and Rhythm: Normal rate and regular rhythm  Heart sounds: No murmur heard  Pulmonary:      Effort: Pulmonary effort is normal  No respiratory distress  Breath sounds: Normal breath sounds   No wheezing, rhonchi or rales  Abdominal:      General: Bowel sounds are normal  There is no distension  Palpations: Abdomen is soft  Tenderness: There is no abdominal tenderness  Musculoskeletal:      Cervical back: Neck supple  Right lower leg: No edema  Left lower leg: No edema  Skin:     General: Skin is warm and dry  Capillary Refill: Capillary refill takes less than 2 seconds  Neurological:      General: No focal deficit present  Mental Status: He is alert  He is disoriented  Cranial Nerves: No cranial nerve deficit     Psychiatric:         Mood and Affect: Mood normal           Additional Data:     Labs:  Results from last 7 days   Lab Units 06/26/23  0253   WBC Thousand/uL 4 78   HEMOGLOBIN g/dL 13 3   HEMATOCRIT % 43 4   PLATELETS Thousands/uL 172   NEUTROS PCT % 48   LYMPHS PCT % 36   MONOS PCT % 11   EOS PCT % 4     Results from last 7 days   Lab Units 06/26/23  0253   SODIUM mmol/L 141   POTASSIUM mmol/L 3 8   CHLORIDE mmol/L 108   CO2 mmol/L 24   BUN mg/dL 17   CREATININE mg/dL 1 11   ANION GAP mmol/L 9   CALCIUM mg/dL 8 7   ALBUMIN g/dL 4 1   TOTAL BILIRUBIN mg/dL 1 66*   ALK PHOS U/L 53   ALT U/L 14   AST U/L 25   GLUCOSE RANDOM mg/dL 89                       Lines/Drains:  Invasive Devices     Peripheral Intravenous Line  Duration           Peripheral IV 06/24/23 Right;Ventral (anterior) Forearm 2 days                      Imaging: Reviewed radiology reports from this admission including: chest xray and CT head    Recent Cultures (last 7 days):         Last 24 Hours Medication List:   Current Facility-Administered Medications   Medication Dose Route Frequency Provider Last Rate   • acetaminophen  650 mg Oral Q6H PRN Donell Gray MD     • calcium carbonate  1,000 mg Oral Daily PRN Donell Gray MD     • cyanocobalamin  1,000 mcg Oral Daily Donell Gray MD     • docusate sodium  100 mg Oral BID Donell Gray MD     • enoxaparin  40 mg Subcutaneous Daily Tony Corbett MD     • ferrous sulfate  325 mg Oral Daily With Breakfast Zan Ngo MD     • fluticasone  1 spray Nasal Daily Tony Corbett MD     • latanoprost  1 drop Both Eyes HS Tony Corbett MD     • levothyroxine  125 mcg Oral QAM Tony Corbett MD     • OLANZapine  2 5 mg Intramuscular Q3H PRN Orjersey Corbett MD     • ondansetron  4 mg Intravenous Q6H PRN Tony Corbett MD     • pravastatin  40 mg Oral Daily With Phani Cottrell MD     • QUEtiapine  50 mg Oral Q12H [address] Elder Pereira DO     • senna  1 tablet Oral Daily Orjersey Corbett MD          Today, Patient Was Seen By: Billy Moon PA-C    **Please Note: This note may have been constructed using a voice recognition system  **

## 2023-06-27 NOTE — ASSESSMENT & PLAN NOTE
Background: History of hypothyroidism hypertension hyperlipidemia recently here read presents to the hospital for the same unable to care for self  · Continue quetiapine at increased dose of 50 mg twice daily given agitation  · Restraints continue to be off since 6/25 at 12:00  · On 1:1, must be weaned off 48 hours prior to placement  · Appreciate psychiatry consult  · Patient started on Depakote 250 mg nightly  Still with agitation today and requiring IM Zyprexa  Further discussion with psychiatry suggesting increase to 500 mg nightly    · Nursing to continue documenting periods of agitation to identify mood pattern  · Awaiting LTC placement - representative coming out to the hospital later today or tomorrow

## 2023-06-28 LAB
ANION GAP SERPL CALCULATED.3IONS-SCNC: 8 MMOL/L
BASOPHILS # BLD AUTO: 0.06 THOUSANDS/ÂΜL (ref 0–0.1)
BASOPHILS NFR BLD AUTO: 1 % (ref 0–1)
BUN SERPL-MCNC: 17 MG/DL (ref 5–25)
CALCIUM SERPL-MCNC: 8.7 MG/DL (ref 8.4–10.2)
CHLORIDE SERPL-SCNC: 109 MMOL/L (ref 96–108)
CO2 SERPL-SCNC: 25 MMOL/L (ref 21–32)
CREAT SERPL-MCNC: 1.03 MG/DL (ref 0.6–1.3)
EOSINOPHIL # BLD AUTO: 0.2 THOUSAND/ÂΜL (ref 0–0.61)
EOSINOPHIL NFR BLD AUTO: 4 % (ref 0–6)
ERYTHROCYTE [DISTWIDTH] IN BLOOD BY AUTOMATED COUNT: 14.7 % (ref 11.6–15.1)
GFR SERPL CREATININE-BSD FRML MDRD: 71 ML/MIN/1.73SQ M
GLUCOSE SERPL-MCNC: 86 MG/DL (ref 65–140)
HCT VFR BLD AUTO: 41.6 % (ref 36.5–49.3)
HGB BLD-MCNC: 13.8 G/DL (ref 12–17)
IMM GRANULOCYTES # BLD AUTO: 0.02 THOUSAND/UL (ref 0–0.2)
IMM GRANULOCYTES NFR BLD AUTO: 0 % (ref 0–2)
LYMPHOCYTES # BLD AUTO: 1.52 THOUSANDS/ÂΜL (ref 0.6–4.47)
LYMPHOCYTES NFR BLD AUTO: 29 % (ref 14–44)
MCH RBC QN AUTO: 33 PG (ref 26.8–34.3)
MCHC RBC AUTO-ENTMCNC: 33.2 G/DL (ref 31.4–37.4)
MCV RBC AUTO: 100 FL (ref 82–98)
MONOCYTES # BLD AUTO: 0.54 THOUSAND/ÂΜL (ref 0.17–1.22)
MONOCYTES NFR BLD AUTO: 10 % (ref 4–12)
NEUTROPHILS # BLD AUTO: 2.84 THOUSANDS/ÂΜL (ref 1.85–7.62)
NEUTS SEG NFR BLD AUTO: 56 % (ref 43–75)
NRBC BLD AUTO-RTO: 0 /100 WBCS
PLATELET # BLD AUTO: 202 THOUSANDS/UL (ref 149–390)
PMV BLD AUTO: 10.5 FL (ref 8.9–12.7)
POTASSIUM SERPL-SCNC: 3.2 MMOL/L (ref 3.5–5.3)
RBC # BLD AUTO: 4.18 MILLION/UL (ref 3.88–5.62)
SODIUM SERPL-SCNC: 142 MMOL/L (ref 135–147)
WBC # BLD AUTO: 5.18 THOUSAND/UL (ref 4.31–10.16)

## 2023-06-28 PROCEDURE — 99232 SBSQ HOSP IP/OBS MODERATE 35: CPT

## 2023-06-28 PROCEDURE — 80048 BASIC METABOLIC PNL TOTAL CA: CPT

## 2023-06-28 PROCEDURE — 85025 COMPLETE CBC W/AUTO DIFF WBC: CPT

## 2023-06-28 RX ORDER — POTASSIUM CHLORIDE 20 MEQ/1
20 TABLET, EXTENDED RELEASE ORAL ONCE
Status: COMPLETED | OUTPATIENT
Start: 2023-06-28 | End: 2023-06-28

## 2023-06-28 RX ORDER — POTASSIUM CHLORIDE 20 MEQ/1
40 TABLET, EXTENDED RELEASE ORAL ONCE
Status: DISCONTINUED | OUTPATIENT
Start: 2023-06-28 | End: 2023-06-30 | Stop reason: HOSPADM

## 2023-06-28 RX ORDER — DIVALPROEX SODIUM 500 MG/1
500 TABLET, EXTENDED RELEASE ORAL
Status: DISCONTINUED | OUTPATIENT
Start: 2023-06-28 | End: 2023-06-30 | Stop reason: HOSPADM

## 2023-06-28 RX ORDER — LANOLIN ALCOHOL/MO/W.PET/CERES
3 CREAM (GRAM) TOPICAL
Status: DISCONTINUED | OUTPATIENT
Start: 2023-06-28 | End: 2023-06-30 | Stop reason: HOSPADM

## 2023-06-28 RX ADMIN — LEVOTHYROXINE SODIUM 150 MCG: 75 TABLET ORAL at 05:00

## 2023-06-28 RX ADMIN — OLANZAPINE 2.5 MG: 10 INJECTION, POWDER, LYOPHILIZED, FOR SOLUTION INTRAMUSCULAR at 18:39

## 2023-06-28 RX ADMIN — DIVALPROEX SODIUM 500 MG: 500 TABLET, EXTENDED RELEASE ORAL at 20:38

## 2023-06-28 RX ADMIN — QUETIAPINE FUMARATE 50 MG: 25 TABLET ORAL at 20:38

## 2023-06-28 RX ADMIN — PRAVASTATIN SODIUM 40 MG: 40 TABLET ORAL at 17:03

## 2023-06-28 RX ADMIN — OLANZAPINE 2.5 MG: 10 INJECTION, POWDER, LYOPHILIZED, FOR SOLUTION INTRAMUSCULAR at 11:26

## 2023-06-28 RX ADMIN — POTASSIUM CHLORIDE 20 MEQ: 1500 TABLET, EXTENDED RELEASE ORAL at 17:03

## 2023-06-28 NOTE — CASE MANAGEMENT
Case Management Progress Note    Patient name Audie Day  Location /-47 MRN [id number]  : 1950 Date 2023       LOS (days): 8  Geometric Mean LOS (GMLOS) (days): 4 70  Days to GMLOS:-3 3        OBJECTIVE:        Current admission status: Inpatient  Preferred Pharmacy:   [address], [address]  [address]  [address]  Phone: [phone] Fax: [address] [facility] [id number] Zhanna Mahajan, [address] [place] [id number]  Phone: [phone] Fax: [phone]    Primary Care Provider: Feli Norman DO    Primary Insurance: MEDICARE  Secondary Insurance: AARP    PROGRESS NOTE: Cm spoke with pts EBRNARDA Win regarding placement progress  The family is still working with [facility] but they do have other places they have been speaking with  A Rep from [id number] will be coming to see pt later today or tomorrow to see if he is appropriate for their facility  Cm anticipating possible placement by  if pt comes off a 1:1 and remains out of restraints

## 2023-06-28 NOTE — ASSESSMENT & PLAN NOTE
· Most recent TSH was 138  · Repeat TSH 87 9 -->T4 low at 0 4  · Levothyroxine increased from 125 mcg daily down to 50 mg daily, continued at increased dose  · Will need outpatient labs in 4 to 6 weeks for repeat testing    Lab Results   Component Value Date    [id number] 87 988 (H) 06/27/2023    [id number] 138 454 (H) 06/11/2023

## 2023-06-28 NOTE — NURSING NOTE
"During med pass, patient was extremely agitated, yelling he wants to die and \"I'm going to take you out\"  Patient trew med cup containing his meds at [facility]) then threw a full container of water at me falling to the floor and spilling on the the floor  Patient threw his pancake at me (BM)  Agitation of patient grew as he attempted to rip out his IV with Long Mahoney, myself and Mehrdad Alberts present in the room  Zyprexa was administered IM with aids Artis Madsen, Carli Chawla and Mehrdad Alberts  Nurses Analia Everett and myself (NING) were present to administer the zyprexa  No controll team called as resources were present to administer and attempt to contain patient    "

## 2023-06-28 NOTE — PROGRESS NOTES
[facility] [place]  Progress Note  Name: Cher Halsted  MRN: [id number]  Unit/Bed#: -01 I Date of Admission: 6/20/2023   Date of Service: 6/28/2023 I Hospital Day: 8    Assessment/Plan   * Encounter for person awaiting admission to adequate facility elsewhere  Assessment & Plan  Background: History of hypothyroidism hypertension hyperlipidemia recently here read presents to the hospital for the same unable to care for self  · Continue quetiapine at increased dose of 50 mg twice daily given agitation  · Restraints continue to be off since 6/25 at 12:00  · On 1:1, must be weaned off 48 hours prior to placement  · Appreciate psychiatry consult  · Patient started on Depakote 250 mg nightly  Still with agitation today and requiring IM Zyprexa  Further discussion with psychiatry suggesting increase to 500 mg nightly  · Nursing to continue documenting periods of agitation to identify mood pattern  · Awaiting LTC placement - representative coming out to the hospital later today or tomorrow    Age-related cognitive decline  Assessment & Plan  · Recent hospitalization for similar including unable to care for self with suspicion for progressive dementia  · TSH and T4 as noted below  B12 and folate within normal limits  · Continue reorientation, sleep-wake cycle, I/O  · Melatonin 3 mg prn hs  · Awaiting LTC placement    History of B12 deficiency  Assessment & Plan  · B12 within normal limits checked on this admission  · Continue supplementation    HLD (hyperlipidemia)  Assessment & Plan  · Continue pravastatin    Unclear benefit at this time    Hypothyroidism  Assessment & Plan  · Most recent TSH was 138  · Repeat TSH 87 9 -->T4 low at 0 4  · Levothyroxine increased from 125 mcg daily down to 50 mg daily, continued at increased dose  · Will need outpatient labs in 4 to 6 weeks for repeat testing    Lab Results   Component Value Date    [id number] 87 988 (H) 06/27/2023    [id number] 339 213 (H) 2023     VTE Pharmacologic Prophylaxis: VTE Score: 5 High Risk (Score >/= 5) - Pharmacological DVT Prophylaxis Ordered: enoxaparin (Lovenox)  Sequential Compression Devices Ordered  Patient Centered Rounds: I performed bedside rounds with nursing staff today  Discussions with Specialists or Other Care Team Provider:     Education and Discussions with Family / Patient: Updated  (daughter in law) via phone  Total Time Spent on Date of Encounter in care of patient: 45 minutes This time was spent on one or more of the following: performing physical exam; counseling and coordination of care; obtaining or reviewing history; documenting in the medical record; reviewing/ordering tests, medications or procedures; communicating with other healthcare professionals and discussing with patient's family/caregivers  Current Length of Stay: 8 day(s)  Current Patient Status: Inpatient   Certification Statement: The patient will continue to require additional inpatient hospital stay due to LTC placement  Discharge Plan: Anticipate discharge in 48-72 hrs to [facility] home  Code Status: Level 1 - Full Code    Subjective:   Patient seen and examined  Objective:     Vitals:   Temp (24hrs), Av 5 °F (36 4 °C), Min:97 4 °F (36 3 °C), Max:97 6 °F (36 4 °C)    Temp:  [97 4 °F (36 3 °C)-97 6 °F (36 4 °C)] 97 6 °F (36 4 °C)  HR:  [69-75] 69  Resp:  [16-18] 16  BP: (129-134)/(86-90) 129/90  SpO2:  [95 %-96 %] 96 %  Body mass index is 27 84 kg/m²  Input and Output Summary (last 24 hours): Intake/Output Summary (Last 24 hours) at 2023 1303  Last data filed at 2023 0446  Gross per 24 hour   Intake 210 ml   Output 100 ml   Net 110 ml       Physical Exam:   Physical Exam  Vitals and nursing note reviewed  Constitutional:       General: He is not in acute distress  Appearance: He is well-developed  HENT:      Head: Normocephalic and atraumatic     Eyes:      Extraocular Movements: Extraocular movements intact  Conjunctiva/sclera: Conjunctivae normal    Cardiovascular:      Rate and Rhythm: Normal rate and regular rhythm  Pulmonary:      Effort: Pulmonary effort is normal  No respiratory distress  Breath sounds: Normal breath sounds  Abdominal:      General: Bowel sounds are normal       Palpations: Abdomen is soft  Tenderness: There is no abdominal tenderness  Musculoskeletal:      Right lower leg: No edema  Left lower leg: No edema  Skin:     General: Skin is warm and dry  Neurological:      General: No focal deficit present  Mental Status: He is alert  He is disoriented     Psychiatric:      Comments: Agitated mood        Additional Data:     Labs:  Results from last 7 days   Lab Units 06/28/23  0612   WBC Thousand/uL 5 18   HEMOGLOBIN g/dL 13 8   HEMATOCRIT % 41 6   PLATELETS Thousands/uL 202   NEUTROS PCT % 56   LYMPHS PCT % 29   MONOS PCT % 10   EOS PCT % 4     Results from last 7 days   Lab Units 06/28/23  0612 06/26/23  0253   SODIUM mmol/L 142 141   POTASSIUM mmol/L 3 2* 3 8   CHLORIDE mmol/L 109* 108   CO2 mmol/L 25 24   BUN mg/dL 17 17   CREATININE mg/dL 1 03 1 11   ANION GAP mmol/L 8 9   CALCIUM mg/dL 8 7 8 7   ALBUMIN g/dL  --  4 1   TOTAL BILIRUBIN mg/dL  --  1 66*   ALK PHOS U/L  --  53   ALT U/L  --  14   AST U/L  --  25   GLUCOSE RANDOM mg/dL 86 89     Lines/Drains:  Invasive Devices     Peripheral Intravenous Line  Duration           Peripheral IV 06/27/23 Left;Proximal;Ventral (anterior) Forearm <1 day              Imaging: Reviewed radiology reports from this admission including: CT head    Recent Cultures (last 7 days):     Last 24 Hours Medication List:   Current Facility-Administered Medications   Medication Dose Route Frequency Provider Last Rate   • acetaminophen  650 mg Oral Q6H PRN Duyen Chacko MD     • calcium carbonate  1,000 mg Oral Daily PRN Duyen Chacko MD     • cyanocobalamin  1,000 mcg Oral Daily Zan Sarah Starkey MD     • divalproex sodium  500 mg Oral HS Jayesh Kruger PA-C     • docusate sodium  100 mg Oral BID Maurice Medina MD     • enoxaparin  40 mg Subcutaneous Daily Maurice Medina MD     • ferrous sulfate  325 mg Oral Daily With Breakfast Maurice Medina MD     • fluticasone  1 spray Nasal Daily Maurice Medina MD     • latanoprost  1 drop Both Eyes HS Maurice Medina MD     • levothyroxine  150 mcg Oral Early Morning Kathryn [place] PA-C     • melatonin  3 mg Oral HS PRN Jayesh Kruger PA-C     • OLANZapine  2 5 mg Intramuscular Q3H PRN Maurice Medina MD     • ondansetron  4 mg Intravenous Q6H PRN Maurice Medina MD     • potassium chloride  40 mEq Oral Once Jayesh Kruger PA-C     • pravastatin  40 mg Oral Daily With Mary [place] MD     • QUEtiapine  50 mg Oral Q12H [address] Author DO Chana     • senna  1 tablet Oral Daily Maurice Medina MD          Today, Patient Was Seen By: Jayesh Kruger PA-C    **Please Note: This note may have been constructed using a voice recognition system  **

## 2023-06-28 NOTE — PHYSICAL THERAPY NOTE
"                                                                                  PHYSICAL THERAPY CANCELLATION NOTE    Patient Name: Daniela Upton  CDLUK'F Date: 6/28/2023 06/28/23 3911   Note Type   Note type Cancelled Session   Cancel Reasons Other   Additional Comments PT orders received, chart review performed  Pt w/ increased agitation this AM  Spoke to Tianna Chino, pt currently yelling \"la la la la I need help\", unable to redirect   Will f/u as appropriate to perform PT eval  (Recommend pt receives PT eval upon admission to [address] [place] [address] to determine if PTneeds required)     Rios Tatum, PT, DPT   "

## 2023-06-28 NOTE — ASSESSMENT & PLAN NOTE
· Recent hospitalization for similar including unable to care for self with suspicion for progressive dementia  · TSH and T4 as noted below   B12 and folate within normal limits  · Continue reorientation, sleep-wake cycle, I/O  · Melatonin 3 mg prn hs  · Awaiting LTC placement

## 2023-06-28 NOTE — OCCUPATIONAL THERAPY NOTE
Occupational Therapy Evaluation     Patient Name: Mark Souza  GRZFL'G Date: 6/28/2023  Problem List  Principal Problem:    Encounter for person awaiting admission to adequate facility elsewhere  Active Problems:    Hypothyroidism    HLD (hyperlipidemia)    History of B12 deficiency    Age-related cognitive decline    Past Medical History  Past Medical History:   Diagnosis Date    Disease of thyroid gland     Hyperlipidemia     Hypertension      Past Surgical History  Past Surgical History:   Procedure Laterality Date    HERNIA REPAIR      REPLACEMENT TOTAL KNEE BILATERAL             06/28/23 1357   OT Last Visit   OT Visit Date 06/28/23   Note Type   Note type Cancelled Session   Cancel Reasons Medical status   Additional Comments Pt yelling & not able to be redirected  Per RN note, pt with increased aggression today  Pt not appropriate for OT intervention this date  Will f/u as able & appropriate         Tena Calero OTR/L

## 2023-06-29 PROBLEM — F02.83: Status: ACTIVE | Noted: 2023-06-29

## 2023-06-29 LAB
ANION GAP SERPL CALCULATED.3IONS-SCNC: 7 MMOL/L
BUN SERPL-MCNC: 19 MG/DL (ref 5–25)
CALCIUM SERPL-MCNC: 8.9 MG/DL (ref 8.4–10.2)
CHLORIDE SERPL-SCNC: 108 MMOL/L (ref 96–108)
CO2 SERPL-SCNC: 26 MMOL/L (ref 21–32)
CREAT SERPL-MCNC: 0.99 MG/DL (ref 0.6–1.3)
DME PARACHUTE DELIVERY DATE REQUESTED: NORMAL
DME PARACHUTE DELIVERY NOTE: NORMAL
DME PARACHUTE ITEM DESCRIPTION: NORMAL
DME PARACHUTE ORDER STATUS: NORMAL
DME PARACHUTE SUPPLIER NAME: NORMAL
DME PARACHUTE SUPPLIER PHONE: NORMAL
GFR SERPL CREATININE-BSD FRML MDRD: 74 ML/MIN/1.73SQ M
GLUCOSE SERPL-MCNC: 84 MG/DL (ref 65–140)
MAGNESIUM SERPL-MCNC: 2.4 MG/DL (ref 1.9–2.7)
POTASSIUM SERPL-SCNC: 3.8 MMOL/L (ref 3.5–5.3)
SODIUM SERPL-SCNC: 141 MMOL/L (ref 135–147)

## 2023-06-29 PROCEDURE — 99232 SBSQ HOSP IP/OBS MODERATE 35: CPT

## 2023-06-29 PROCEDURE — 83735 ASSAY OF MAGNESIUM: CPT

## 2023-06-29 PROCEDURE — 97116 GAIT TRAINING THERAPY: CPT

## 2023-06-29 PROCEDURE — 97163 PT EVAL HIGH COMPLEX 45 MIN: CPT

## 2023-06-29 PROCEDURE — 80048 BASIC METABOLIC PNL TOTAL CA: CPT

## 2023-06-29 PROCEDURE — 97530 THERAPEUTIC ACTIVITIES: CPT

## 2023-06-29 RX ORDER — LANOLIN ALCOHOL/MO/W.PET/CERES
3 CREAM (GRAM) TOPICAL
Qty: 30 TABLET | Refills: 0 | Status: SHIPPED | OUTPATIENT
Start: 2023-06-29

## 2023-06-29 RX ORDER — LEVOTHYROXINE SODIUM 0.15 MG/1
150 TABLET ORAL
Qty: 30 TABLET | Refills: 0 | Status: SHIPPED | OUTPATIENT
Start: 2023-06-30

## 2023-06-29 RX ORDER — DIVALPROEX SODIUM 500 MG/1
500 TABLET, EXTENDED RELEASE ORAL
Qty: 30 TABLET | Refills: 0 | Status: SHIPPED | OUTPATIENT
Start: 2023-06-29

## 2023-06-29 RX ORDER — QUETIAPINE FUMARATE 50 MG/1
50 TABLET, FILM COATED ORAL EVERY 12 HOURS SCHEDULED
Qty: 60 TABLET | Refills: 0 | Status: SHIPPED | OUTPATIENT
Start: 2023-06-29

## 2023-06-29 RX ADMIN — FERROUS SULFATE TAB 325 MG (65 MG ELEMENTAL FE) 325 MG: 325 (65 FE) TAB at 12:41

## 2023-06-29 RX ADMIN — CYANOCOBALAMIN TAB 500 MCG 1000 MCG: 500 TAB at 12:41

## 2023-06-29 RX ADMIN — DIVALPROEX SODIUM 500 MG: 500 TABLET, EXTENDED RELEASE ORAL at 20:03

## 2023-06-29 RX ADMIN — QUETIAPINE FUMARATE 50 MG: 25 TABLET ORAL at 12:40

## 2023-06-29 RX ADMIN — LATANOPROST 1 DROP: 50 SOLUTION OPHTHALMIC at 21:15

## 2023-06-29 RX ADMIN — QUETIAPINE FUMARATE 50 MG: 25 TABLET ORAL at 20:03

## 2023-06-29 NOTE — PROGRESS NOTES
-- Patient:  -- MRN: [id number]  -- Aidin Request ID: [id number]  -- Level of care reserved: Stephane Price  -- Partner Reserved: Rose Marie Correa [address] [phone]  -- Clinical needs requested:  -- Geography searched: 20 [place] around [address] [place] [address]  -- Start of Service:  -- Request sent: 10:41am EDT on 6/20/2023 by Maren Mark  -- Partner reserved: 12:50pm EDT on 6/29/2023 by Maren Mark  -- Choice list shared: 12:50pm EDT on 6/29/2023 by Maren Mark

## 2023-06-29 NOTE — CASE MANAGEMENT
Case Management Progress Note    Patient name Homar Stone  Location /-04 MRN [id number]  : 1950 Date 2023       LOS (days): 9  Geometric Mean LOS (GMLOS) (days): 4 70  Days to GMLOS:-4 4        OBJECTIVE:        Current admission status: Inpatient  Preferred Pharmacy:   [address], [address]  [address] [facility],[address]  [address]  Phone: [phone] Fax: [address] [facility] [id number] Susi , [address] [place] [address]  Phone: [phone] Fax: [phone]    Primary Care Provider: Michelle Wray DO    Primary Insurance: MEDICARE  Secondary Insurance: AARP    PROGRESS NOTE: Cm spoke with Shanae from [employer]  Pt is good to dc to [facility] [place] tomorrow   They request a transport before 12pm  AC requesting DME to be ordered to be delivered to [facility]  Rw and wc ordered  Pts scripts to be sent to the [facility] in [place] that is in his chart and family will  and take to [facility] [place]   Cm to arrange transport for tomorrow

## 2023-06-29 NOTE — ASSESSMENT & PLAN NOTE
Background: History of hypothyroidism hypertension hyperlipidemia recently here read presents to the hospital for the same unable to care for self  · Planning for discharge to [address] [place] [address] memory care unit  Medications sent to patient's pharmacy at [facility] in [place]  Family will     Appreciate CM and safe disposition planning  · Outpatient follow-up with PCP, geriatrics appointment scheduled for the 7/10/2023

## 2023-06-29 NOTE — OCCUPATIONAL THERAPY NOTE
"  Occupational Therapy Tx Note     Patient Name: Leslie MARIA Date: 6/29/2023  Problem List  Principal Problem:    Encounter for person awaiting admission to adequate facility elsewhere  Active Problems:    Hypothyroidism    HLD (hyperlipidemia)    History of B12 deficiency    Age-related cognitive decline            06/29/23 1008   OT Last Visit   OT Visit Date 06/29/23   Note Type   Note Type Treatment   Pain Assessment   Pain Assessment Tool Lee-Baker FACES   Lee-Baker FACES Pain Rating 0   Restrictions/Precautions   Weight Bearing Precautions Per Order No   Other Precautions Chair Alarm;Cognitive; Bed Alarm; Fall Risk   ADL   Toileting Assistance  2  Maximal Assistance   Toileting Deficit Perineal hygiene   Toileting Comments D/t cognition   Bed Mobility   Additional Comments Received sitting EOB   Transfers   Sit to Stand 4  Minimal assistance   Additional items Assist x 1;Verbal cues; Increased time required  (for CGA  Progressed to supervision)   Stand to Sit 5  Supervision   Additional items Increased time required;Verbal cues;Armrests   Additional Comments Pt required max verbal cueing/coaxing to participate  Once standing, pts active participation significantly increased  Functional Mobility   Functional Mobility 4  Minimal assistance   Additional Comments x1 - required Min Ax1 for safety d/t cognition  Pt with (+) LOB when turning  Shuffled  Difficulty with RW management when turning, likely d/t unfamiliarity with RW  Required chair follow for safety   Additional items Rolling walker   Subjective   Subjective \"I want a good cup of coffee\"   Cognition   Overall Cognitive Status Impaired   Arousal/Participation Alert; Cooperative   Attention Difficulty attending to directions   Orientation Level Oriented to person;Disoriented to time;Disoriented to place; Disoriented to situation   Memory Decreased recall of precautions;Decreased recall of recent events;Decreased short term memory;Decreased " recall of biographical information;Decreased long term memory   Following Commands Follows one step commands with increased time or repetition   Activity Tolerance   Activity Tolerance Other (Comment)  (Cognition)   Medical Staff Made Aware PT SHEFALI Quiñones Lleand   Assessment   Assessment Pt seen for OT tx session with focus on functional balance, functional mobility, ADL status, and transfer safety  Patient agreeable to OT treatment session  Pt received seated at edge of bed  Pt required overall Min A for all functional txfers/mobility  Pt utilized RW for mobility, however, noted to have difficulty managing RW during turns, likely due to unfamiliarity of RW & cognition  Pt with (+) LOB during mobility, required Min A to correct  Pt required Max A for harmony-care s/p bowel movement d/t impaired cognition  Pt mobilized greater than household distance through unit halls with RW & a chair follow  Patient continues to be functioning below baseline level, occupational performance remains limited secondary to factors listed above, and pt at increased risk for falls and injury  The patient's raw score on the AM-PAC Daily Activity inpatient short form is 18, standardized score is 38 66, less than 39 4  Patients at this level are likely to benefit from DC to post-acute rehabilitation services  Please refer to the recommendation of the Occupational Therapist for safe DC planning  From OT standpoint, recommendation at time of D/C would be DC with Level III resources  Patient to benefit from continued Occupational Therapy treatment while in the hospital to address deficits as defined above and maximize level of functional independence with ADLs and functional mobility  Pt left seated OOB to Recliner with call bell in reach, tray table in reach, needs met, chair alarm activated, RN informed     Plan   Treatment Interventions ADL retraining;Functional transfer training;Cognitive reorientation;Patient/family training; Activityengagement;Equipment evaluation/education   Goal Expiration Date 07/02/23   OT Treatment Day 1   OT Frequency 1-2x/wk   Recommendation   UB Rehab Discharge Recommendation (PT/OT) Level 3  (Pt would benefit from skilled therapy at facility)   AM-PAC Daily Activity Inpatient   Lower Body Dressing 3   Bathing 3   Toileting 2   Upper Body Dressing 3   Grooming 3   Eating 4   Daily Activity Raw Score 18   Daily Activity Standardized Score (Calc for Raw Score >=11) 38 66   AM-PAC Applied Cognition Inpatient   Following a Speech/Presentation 2   Understanding Ordinary Conversation 3   Taking Medications 2   Remembering Where Things Are Placed or Put Away 1   Remembering List of 4-5 Errands 1   Taking Care of Complicated Tasks 1   Applied Cognition Raw Score 10   Applied Cognition Standardized Score 24 98   End of Consult   Education Provided Yes;Family or social support of family present for education by provider   Patient Position at End of Consult Bedside chair;Bed/Chair alarm activated; All needs within reach   Nurse Communication Nurse aware of consult   End of Consult Comments Nursing home staff present       Brooklyn Lee OTR/L

## 2023-06-29 NOTE — PLAN OF CARE
Problem: PHYSICAL THERAPY ADULT  Goal: Performs mobility at highest level of function for planned discharge setting  See evaluation for individualized goals  Description: Treatment/Interventions: Functional transfer training, LE strengthening/ROM, Therapeutic exercise, Endurance training, Cognitive reorientation, Patient/family training, Equipment eval/education, Gait training, Bed mobility  Equipment Recommended: Will Alexeybayron       See flowsheet documentation for full assessment, interventions and recommendations  6/29/2023 1237 by Myke Hoskins PT  Note:    Problem List: Decreased strength, Decreased endurance, Impaired balance, Decreased mobility, Decreased cognition, Decreased safety awareness  Assessment: Patient will: Perform all bed mobility tasks w/ supervision to improve pt's independence w/ repositioning for decrease risk of skin breakdown, Perform all transfers w/ supervision consistently from various height surfaces in order to improve I w/ engagement w/ real-world environments/situations, Ambulate at least 50 ft  with roller walker w/ supervision w/o LOB to facilitate return and engagement w/ previous living environment and Navigate 1 stairs w/ supervision without handrail to either improve independence w/ entering home and/or so patient can fully access living areas in home             See flowsheet documentation for full assessment

## 2023-06-29 NOTE — ASSESSMENT & PLAN NOTE
· Most recent TSH was 138  · Repeat TSH 87 9 -->T4 low at 0 4  · Levothyroxine increased from 125 mcg daily down to 50 mg daily, continued at increased dose  · Will need outpatient labs in 4 to 6 weeks for repeat testing    Lab Results   Component Value Date    [id number] 87 988 (H) 06/27/2023    [id number] 138 454 (H) 06/11/2023

## 2023-06-29 NOTE — ASSESSMENT & PLAN NOTE
Background: History of hypothyroidism hypertension hyperlipidemia recently here read presents to the hospital for the same unable to care for self  · Continue quetiapine at increased dose of 50 mg twice daily given agitation  · Restraints continue to be off since 6/25  · 1:1 discontinued 6/28/2023  · Appreciate psychiatry consult  · Patient started on Depakote 250 mg nightly  Still with agitation and requiring IM Zyprexa  Further discussion with psychiatry and this was increased to 500 mg nightly on 6/28/2023, continue  · Nursing notes reviewed  Appreciate nursing to continue documenting periods of agitation/appropriate behavior to identify mood pattern  · Planning for discharge to [facility]  Medications will be sent to patient's pharmacy at [facility] in [place]  Family will     Appreciate CM and safe disposition planning  · Will need outpatient follow-up with PCP geriatrics and psychiatry

## 2023-06-29 NOTE — CASE MANAGEMENT
Case Management Progress Note    Patient name Ambreen Torres  Location /-13 MRN [id number]  : 1950 Date 2023       LOS (days): 9  Geometric Mean LOS (GMLOS) (days): 4 70  Days to GMLOS:-4 3        OBJECTIVE:        Current admission status: Inpatient  Preferred Pharmacy:   [address], [address] [facility]  [address] [facility]  [address]  Phone: [phone] Fax: [phone] [place] [address] Marbin Candelaria, [address] [place], [place] [address]  Phone: [phone] Fax: [phone]    Primary Care Provider: Zhen Anderson DO    Primary Insurance: MEDICARE  Secondary Insurance: AARP    PROGRESS NOTE: Cm met with Davina Landau from [facility]  Sigrid was able to meet with pt and see him ambulate with Pt/Ot  Pt can likely go to [facility]  Tae Anjeldebi will speak with pts family about the financials  Medical form given to CM for Provider to fill out  Cm will collect form back and send to [facility]  Cm to attach Pt/Ot notes in referral  Psych eval and medication list provided to [employer] in person   Cm following

## 2023-06-29 NOTE — PLAN OF CARE
Problem: OCCUPATIONAL THERAPY ADULT  Goal: Performs self-care activities at highest level of function for planned discharge setting  See evaluation for individualized goals  Description: Treatment Interventions: ADL retraining, Functional transfer training, Cognitive reorientation, Patient/family training, Activityengagement, Equipment evaluation/education          See flowsheet documentation for full assessment, interventions and recommendations  Outcome: Not Progressing  Note: Limitation: Decreased ADL status, Decreased Safe judgement during ADL, Decreased cognition, Decreased self-care trans, Decreased high-level ADLs  Prognosis: Fair  Assessment: Pt seen for OT tx session with focus on functional balance, functional mobility, ADL status, and transfer safety  Patient agreeable to OT treatment session  Pt received seated at edge of bed  Pt required overall Min A for all functional txfers/mobility  Pt utilized RW for mobility, however, noted to have difficulty managing RW during turns, likely due to unfamiliarity of RW & cognition  Pt with (+) LOB during mobility, required Min A to correct  Pt required Max A for harmony-care s/p bowel movement d/t impaired cognition  Pt mobilized greater than household distance through unit halls with RW & a chair follow  Patient continues to be functioning below baseline level, occupational performance remains limited secondary to factors listed above, and pt at increased risk for falls and injury  The patient's raw score on the AM-PAC Daily Activity inpatient short form is 18, standardized score is 38 66, less than 39 4  Patients at this level are likely to benefit from DC to post-acute rehabilitation services  Please refer to the recommendation of the Occupational Therapist for safe DC planning  From OT standpoint, recommendation at time of D/C would be DC with Level III resources   Patient to benefit from continued Occupational Therapy treatment while in the hospital to address deficits as defined above and maximize level of functional independence with ADLs and functional mobility  Pt left seated OOB to Recliner with call bell in reach, tray table in reach, needs met, chair alarm activated, RN informed

## 2023-06-29 NOTE — NURSING NOTE
Patient was able to take his scheduled depakote and seroquel  Nurse then noted that patient was soiled  Patient refused to allow this nurse to help him get cleaned up  Patient began to attempt to climb out of bed, then pull pad out from under himself, causing his hands to be covered in his own feces  Patient pulled soiled pad out and attempted to throw it at this nurse and POA  Was trying to hit this nurse and POA with soiled hands  This nurse, PCA, PA and another nurse cleaned him, changed linens and repositioned him in bed  Patient continued to attempt to kick and punch staff throughout process

## 2023-06-29 NOTE — PHYSICAL THERAPY NOTE
PHYSICAL THERAPY EVALUATION NOTE      Patient Name: Tiff Call  AJPQO'X Date: 2023    AGE:   74 y o  Mrn:   [id number]  ADMIT DX:  Abnormal behavior [R46 89]  Failure to thrive in adult [R62 7]  Change in mental status [R41 82]    Past Medical History:   Diagnosis Date    Disease of thyroid gland     Hyperlipidemia     Hypertension      Length Of Stay: 9  PHYSICAL THERAPY EVALUATION :   Patient's identity confirmed via 2 patient identifiers (full name and ) at start of session       23 0940   PT Last Visit   PT Visit Date 23   Note Type   Note type Evaluation   Pain Assessment   Pain Assessment Tool 0-10   Pain Score No Pain   Restrictions/Precautions   Weight Bearing Precautions Per Order No   Other Precautions Cognitive; Chair Alarm; Bed Alarm; Fall Risk   Home Living   Type of Home House  (Bilevel)   Home Layout Two level  (0 CHAZ)   Prior Function   Level of Winn Needs assistance with ADLs; Needs assistance with IADLS; Independent with functional mobility   Lives With Significant other   Receives Help From Family   IADLs Family/Friend/Other provides transportation; Family/Friend/Other provides meals; Family/Friend/Other provides medication management   Vocational   (pt was working at [employer])   General   Additional Pertinent History Upon arrival, 4 PCAs and 2 reps from [email or web address] in room  Requested PCAs to step out to limit overwhelming environment for patient  Pt does better with a smaller group   Family/Caregiver Present   (Daughter over facetime, [address] reps)   Cognition   Overall Cognitive Status Impaired   Arousal/Participation (S)  Cooperative   Attention Attends with cues to redirect   Orientation Level Oriented to place; Disoriented to place; Disoriented to time;Disoriented to situation   Memory Decreased recall of precautions;Decreased recall of recent events;Decreased short term "memory;Decreased long term memory;Decreased recall of biographical information   Following Commands Follows one step commands with increased time or repetition   Comments Pt pleasantly confused throughout session  Redirectable  Cries when sees DIL over facetime, verbalizes being tired  Subjective   Subjective \"Where can I get a good cup of coffee? \"   RLE Assessment   RLE Assessment WFL   Strength RLE   RLE Overall Strength 4-/5   LLE Assessment   LLE Assessment WFL   Strength LLE   LLE Overall Strength 4-/5   Bed Mobility   Supine to Sit Unable to assess   Additional Comments Pt already sitting EOB upon arrival   Transfers   Sit to Stand 4  Minimal assistance   Additional items Assist x 1; Increased time required  (CGA)   Stand to Sit 5  Supervision   Additional items Assist x 1   Additional Comments Pt requires 3 trials for STS, but due to cog  Pt also appears fearful, likely due to not being OOB over the last few days  Ambulation/Elevation   Gait pattern Decreased foot clearance;Shuffling; Short stride; Ataxia   Gait Assistance 4  Minimal assist   Additional items Assist x 1   Assistive Device Rolling walker   Distance 150 ft   Ambulation/Elevation Additional Comments Pt requires mod-max A x 1 when turning, as he has LOB when turning   Balance   Static Sitting Fair +   Dynamic Sitting Fair +   Static Standing Fair -   Dynamic Standing Fair -   Ambulatory Poor +   Activity Tolerance   Activity Tolerance Patient tolerated treatment well  (cognition)   Medical Staff Made Aware CEASAR Cabrera   Nurse Made Aware SHEFALI Hudson   Assessment   Problem List Decreased strength;Decreased endurance; Impaired balance;Decreased mobility; Decreased cognition;Decreased safety awareness   Assessment Patient will: Perform all bed mobility tasks w/ supervision to improve pt's independence w/ repositioning for decrease risk of skin breakdown, Perform all transfers w/ supervision consistently from various height surfaces in order to improve I w/ " engagement w/ real-world environments/situations, Ambulate at least 50 ft  with roller walker w/ supervision w/o LOB to facilitate return and engagement w/ previous living environment and Navigate 1 stairs w/ supervision without handrail to either improve independence w/ entering home and/or so patient can fully access living areas in home   Goals   Patient Goals to have a good cup of coffee   STG Expiration Date 07/09/23   Short Term Goal #1 Patient will: Perform all bed mobility tasks w/ supervision to improve pt's independence w/ repositioning for decrease risk of skin breakdown, Perform all transfers w/ supervision consistently from various height surfaces in order to improve I w/ engagement w/ real-world environments/situations, Ambulate at least 150 ft  +/- LRAD w/ supervision w/o LOB to facilitate return and engagement w/ previous living environment and Tolerate 3 hr OOB to faciliate upright tolerance   PT Treatment Day 0   Plan   Treatment/Interventions Functional transfer training;LE strengthening/ROM; Therapeutic exercise; Endurance training;Cognitive reorientation;Patient/family training;Equipment eval/education;Gait training;Bed mobility   PT Frequency 2-3x/wk   Recommendation   UB Rehab Discharge Recommendation (PT/OT) Level 3  (skilled PT at facility)   Equipment Recommended Ernie 11 Basic Mobility Inpatient   Turning in Flat Bed Without Bedrails 3   Lying on Back to Sitting on Edge of Flat Bed Without Bedrails 3   Moving Bed to Chair 3   Standing Up From Chair Using Arms 3   Walk in Room 3   Climb 3-5 Stairs With Railing 1   Basic Mobility Inpatient Raw Score 16   Basic Mobility Standardized Score 38 32   Highest Level Of Mobility   JH-HLM Goal 5: Stand one or more mins   JH-HLM Achieved 7: Walk 25 feet or more   Additional Treatment Session   Start Time 1000   End Time 1010   Treatment Assessment Pt seated OOB in recliner chair  Confused and cooperative   Pt is able to perform STS from recliner chair w/ supervision  Pt is able to ambulate 10 ft w/ RW w/ CGA in a straight line  Pt redirected back to recliner chair to sit and watch the [employer] highlights for the [employer]  Throughout session, pt is not agitated, irritable  Pleasantly confused, redirectable  Joking with therapist throughout  Cooperative to directions, sometimes with increased time  Pt seated OOB in recliner chair w/ chair alarm activated  Equipment Use RW   Additional Treatment Day 1   End of Consult   Patient Position at End of Consult Bedside chair;Bed/Chair alarm activated; All needs within reach     Pt would benefit from skilled inpatient PT during this admission in order to facilitate progress towards goals to maximize functional independence    Katja Rider, PT, DPT

## 2023-06-29 NOTE — NURSING NOTE
Pt resting comfortably since assumed care at 0400  Pt is out of restraints and off of all continual observation  Pt required full bed change at 0600, clean and dry  All needs met at this time

## 2023-06-29 NOTE — NURSING NOTE
Patient was agitated and attempting to climb out of bed  Patient was visibly soiled  This nurse and PCA cleaned patient and replaced linens  Patient continued to attempt to climb out of bed, trying to kick POA  IM Zyprexa administered when patient was unable to be redirected

## 2023-06-29 NOTE — PROGRESS NOTES
[place]  Progress Note  Name: Jeremy Phillips  MRN: [id number]  Unit/Bed#: -01 I Date of Admission: 6/20/2023   Date of Service: 6/29/2023 I Hospital Day: 9    Assessment/Plan   * Encounter for person awaiting admission to adequate facility elsewhere  Assessment & Plan  Background: History of hypothyroidism hypertension hyperlipidemia recently here read presents to the hospital for the same unable to care for self  · Continue quetiapine at increased dose of 50 mg twice daily given agitation  · Restraints continue to be off since 6/25  · 1:1 discontinued 6/28/2023  · Appreciate psychiatry consult  · Patient started on Depakote 250 mg nightly  Still with agitation and requiring IM Zyprexa  Further discussion with psychiatry and this was increased to 500 mg nightly on 6/28/2023, continue  · Nursing notes reviewed  Appreciate nursing to continue documenting periods of agitation/appropriate behavior to identify mood pattern  · Planning for discharge to [facility]  Medications will be sent to patient's pharmacy at [facility] in [place]  Family will   Appreciate CM and safe disposition planning  · Will need outpatient follow-up with PCP geriatrics and psychiatry    Age-related cognitive decline  Assessment & Plan  · Recent hospitalization for similar including unable to care for self with suspicion for progressive dementia  · TSH and T4 as noted below   B12 and folate within normal limits  · Continue reorientation, sleep-wake cycle, I/O  · Melatonin 3 mg prn hs  · Awaiting LTC placement    History of B12 deficiency  Assessment & Plan  · B12 within normal limits checked on this admission  · Continue supplementation    HLD (hyperlipidemia)  Assessment & Plan  · Unclear benefit at this time, continue pravastatin  · Defer removing med to patient's PCP    Hypothyroidism  Assessment & Plan  · Most recent TSH was 138  · Repeat TSH 87 9 -->T4 low at 0 4  · Levothyroxine increased from 125 mcg daily down to 50 mg daily, continued at increased dose  · Will need outpatient labs in 4 to 6 weeks for repeat testing    Lab Results   Component Value Date    [id number] 87 988 (H) 2023    [id number] 138 454 (H) 2023       VTE Pharmacologic Prophylaxis: VTE Score: 5 High Risk (Score >/= 5) - Pharmacological DVT Prophylaxis Ordered: enoxaparin (Lovenox)  Sequential Compression Devices Ordered  Patient Centered Rounds: I performed bedside rounds with nursing staff today  Discussions with Specialists or Other Care Team Provider: CM, Psychiatry    Education and Discussions with Family / Patient: Updated  (grand daughter) via phone  Total Time Spent on Date of Encounter in care of patient: 65 minutes This time was spent on one or more of the following: performing physical exam; counseling and coordination of care; obtaining or reviewing history; documenting in the medical record; reviewing/ordering tests, medications or procedures; communicating with other healthcare professionals and discussing with patient's family/caregivers  Current Length of Stay: 9 day(s)  Current Patient Status: Inpatient   Certification Statement: The patient will continue to require additional inpatient hospital stay due to LTC placement  Discharge Plan: Anticipate discharge tomorrow to [facility]    Code Status: Level 1 - Full Code    Subjective:   Patient seen and examined  He has no complaints  Says he is feeling well and appreciates that I knocked on the door  Patient believes we are at his house  Objective:     Vitals:   Temp (24hrs), Av 2 °F (36 2 °C), Min:96 9 °F (36 1 °C), Max:97 5 °F (36 4 °C)    Temp:  [96 9 °F (36 1 °C)-97 5 °F (36 4 °C)] 97 2 °F (36 2 °C)  HR:  [67] 67  Resp:  [17] 17  BP: (132-142)/(90-99) 142/90  Body mass index is 27 41 kg/m²  Input and Output Summary (last 24 hours):      Intake/Output Summary (Last 24 hours) at 2023 1501  Last data filed at 6/29/2023 1255  Gross per 24 hour   Intake 900 ml   Output --   Net 900 ml       Physical Exam:   Physical Exam  Vitals and nursing note reviewed  Constitutional:       General: He is not in acute distress  Appearance: Normal appearance  He is well-developed  He is not ill-appearing  Comments: Sitting in chair comfortably   HENT:      Head: Normocephalic and atraumatic  Cardiovascular:      Rate and Rhythm: Normal rate and regular rhythm  Heart sounds: Normal heart sounds  No murmur heard  Pulmonary:      Effort: Pulmonary effort is normal  No respiratory distress  Breath sounds: Normal breath sounds  No wheezing, rhonchi or rales  Abdominal:      General: Bowel sounds are normal       Palpations: Abdomen is soft  Tenderness: There is no abdominal tenderness  Musculoskeletal:      Right lower leg: No edema  Left lower leg: No edema  Skin:     General: Skin is warm and dry  Neurological:      General: No focal deficit present  Mental Status: He is alert  He is disoriented  Psychiatric:         Mood and Affect: Mood normal       Comments: Pleasant         Additional Data:     Labs:  Results from last 7 days   Lab Units 06/28/23  0612   WBC Thousand/uL 5 18   HEMOGLOBIN g/dL 13 8   HEMATOCRIT % 41 6   PLATELETS Thousands/uL 202   NEUTROS PCT % 56   LYMPHS PCT % 29   MONOS PCT % 10   EOS PCT % 4     Results from last 7 days   Lab Units 06/29/23  0602 06/28/23  0612 06/26/23  0253   SODIUM mmol/L 141   < > 141   POTASSIUM mmol/L 3 8   < > 3 8   CHLORIDE mmol/L 108   < > 108   CO2 mmol/L 26   < > 24   BUN mg/dL 19   < > 17   CREATININE mg/dL 0 99   < > 1 11   ANION GAP mmol/L 7   < > 9   CALCIUM mg/dL 8 9   < > 8 7   ALBUMIN g/dL  --   --  4 1   TOTAL BILIRUBIN mg/dL  --   --  1 66*   ALK PHOS U/L  --   --  53   ALT U/L  --   --  14   AST U/L  --   --  25   GLUCOSE RANDOM mg/dL 84   < > 89    < > = values in this interval not displayed  Lines/Drains:  Invasive Devices     Peripheral Intravenous Line  Duration           Peripheral IV 06/27/23 Left;Proximal;Ventral (anterior) Forearm 2 days                Imaging: No pertinent imaging reviewed  Recent Cultures (last 7 days):     Last 24 Hours Medication List:   Current Facility-Administered Medications   Medication Dose Route Frequency Provider Last Rate   • acetaminophen  650 mg Oral Q6H PRN Zakiya Dior MD     • calcium carbonate  1,000 mg Oral Daily PRN Zakiya Dior MD     • cyanocobalamin  1,000 mcg Oral Daily Zakiya Dior MD     • divalproex sodium  500 mg Oral HS Misbah Fernandez PA-C     • docusate sodium  100 mg Oral BID Zakiya Dior MD     • enoxaparin  40 mg Subcutaneous Daily Zakiya Dior MD     • ferrous sulfate  325 mg Oral Daily With Breakfast Zakiya Dior MD     • fluticasone  1 spray Nasal Daily Zakiya Dior MD     • latanoprost  1 drop Both Eyes HS Zakiya Dior MD     • levothyroxine  150 mcg Oral Early Morning Dominick Houston PA-C     • melatonin  3 mg Oral HS PRN Pepito Watt PA-C     • OLANZapine  2 5 mg Intramuscular Q3H PRN Zakiya Dior MD     • ondansetron  4 mg Intravenous Q6H PRN Zakiya Dior MD     • potassium chloride  40 mEq Oral Once Pepito Watt PA-C     • pravastatin  40 mg Oral Daily With Wolf Brar MD     • QUEtiapine  50 mg Oral Q12H [address] Afia Brood, DO     • senna  1 tablet Oral Daily Zakiya Dior MD          Today, Patient Was Seen By: Pepito Watt PA-C    **Please Note: This note may have been constructed using a voice recognition system  **

## 2023-06-30 VITALS
HEIGHT: 69 IN | BODY MASS INDEX: 27.36 KG/M2 | SYSTOLIC BLOOD PRESSURE: 115 MMHG | OXYGEN SATURATION: 96 % | TEMPERATURE: 98.5 F | DIASTOLIC BLOOD PRESSURE: 74 MMHG | WEIGHT: 184.75 LBS | HEART RATE: 74 BPM | RESPIRATION RATE: 17 BRPM

## 2023-06-30 PROCEDURE — 99239 HOSP IP/OBS DSCHRG MGMT >30: CPT

## 2023-06-30 RX ADMIN — CYANOCOBALAMIN TAB 500 MCG 1000 MCG: 500 TAB at 08:58

## 2023-06-30 RX ADMIN — LEVOTHYROXINE SODIUM 150 MCG: 75 TABLET ORAL at 06:11

## 2023-06-30 RX ADMIN — FERROUS SULFATE TAB 325 MG (65 MG ELEMENTAL FE) 325 MG: 325 (65 FE) TAB at 08:59

## 2023-06-30 RX ADMIN — QUETIAPINE FUMARATE 50 MG: 25 TABLET ORAL at 08:58

## 2023-06-30 NOTE — ASSESSMENT & PLAN NOTE
· Most recent TSH was 138  · Repeat TSH 87 9 -->T4 low at 0 4  · Levothyroxine increased from 125 mcg daily down to 50 mg daily, continued at increased dose  · Will need outpatient labs in 4 to 6 weeks for repeat testing    Lab Results   Component Value Date    [id number] 87 988 (H) 06/27/2023    [id number] 138 454 (H) 06/11/2023

## 2023-06-30 NOTE — CASE MANAGEMENT
Case Management Progress Note    Patient name Oleksandr Jeronimo  Location /-53 MRN [id number]  : 1950 Date 2023       LOS (days): 10  Geometric Mean LOS (GMLOS) (days): 4 70  Days to GMLOS:-5 2        OBJECTIVE:        Current admission status: Inpatient  Preferred Pharmacy:   [address] [facility] [address] [facility]  [address] [facility]  [address]  Phone: [phone] Fax: [address] [place] [address] [address] [place] [address]  Phone: [phone] Fax: [phone]    Primary Care Provider: Ezio Merino DO    Primary Insurance: MEDICARE  Secondary Insurance: AARP    PROGRESS NOTE: Cm spoke with pts BERNARDA Christianson regarding DC today  [facility] at 10[facility]30 am via stretcher van  Medical team is aware of dc time

## 2023-06-30 NOTE — DISCHARGE SUMMARY
[place]  Discharge- Rosalio Joy [birth year], 74 y o  male MRN: [id number]  Unit/Bed#: -01 Encounter: [id number]  Primary Care Provider: Rishabh Payment, DO   Date and time admitted to hospital: 6/20/2023  3:28 AM    * Encounter for person awaiting admission to adequate facility elsewhere  Assessment & Plan  Background: History of hypothyroidism hypertension hyperlipidemia recently here read presents to the hospital for the same unable to care for self  · Planning for discharge to [address] [place] [address] memory care unit  Medications sent to patient's pharmacy at [facility] in [place]  Family will   Appreciate CM and safe disposition planning  · Outpatient follow-up with PCP, geriatrics appointment scheduled for the 7/10/2023    Dementia in other diseases classified elsewhere, unspecified severity, with mood disturbance ([facility] [address] )  Assessment & Plan  · MoCA noted on 6/13/2023    Age-related cognitive decline  Assessment & Plan  · Recent hospitalization for similar including unable to care for self with suspicion for progressive dementia  · Psychiatry consulted, appreciate recommendations  · Continue Depakote  mg nightly  · Continue Seroquel 50 mg twice daily  · TSH and T4 as noted below  Increased synthroid dose   B12 and folate within normal limits  · Melatonin 3 mg prn hs  · Suggest obtaining Depakote level, CBC with differential and CMP in 1 week postdischarge    History of B12 deficiency  Assessment & Plan  · B12 within normal limits checked on this admission  · Continue supplementation on discharge    HLD (hyperlipidemia)  Assessment & Plan  · Unclear benefit at this time, continue pravastatin  · Defer medication removal to patient's PCP    Hypothyroidism  Assessment & Plan  · Most recent TSH was 138  · Repeat TSH 87 9 -->T4 low at 0 4  · Levothyroxine increased from 125 mcg daily down to 50 mg daily, continued at increased dose  · Will need outpatient labs in 4 to 6 weeks for repeat testing    Lab Results   Component Value Date    [id number] 87 988 (H) 06/27/2023    [id number] 138 454 (H) 06/11/2023     Medical Problems     Resolved Problems  Date Reviewed: 6/30/2023   None       Discharging Physician / Practitioner: Esvin Lambert PA-C  PCP: Alta Christie DO  Admission Date:   Admission Orders (From admission, onward)     Ordered        06/20/23 1004  [address] [facility] [facility]                      Discharge Date: 06/30/23    Consultations During Hospital Stay:  · Psychiatry, PT, OT    Procedures Performed:     Significant Findings / Test Results:     XR shoulder 2+ vw right  Result Date: 6/20/2023  FINDINGS: There is no acute fracture or dislocation  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  No lytic or blastic osseous lesion  Soft tissues are unremarkable  Impression: No acute osseous abnormality  CT head without contrast  Result Date: 6/20/2023  FINDINGS: PARENCHYMA: Decreased attenuation is noted in periventricular and subcortical white matter demonstrating an appearance that is statistically most likely to represent advanced microangiopathic change  Gray-white differentiation appears maintained  No CT signs of acute territorial infarction  No intracranial mass, mass effect or midline shift  No acute parenchymal hemorrhage  Parenchymal atrophy  VENTRICLES AND EXTRA-AXIAL SPACES:  Ventricles and extra-axial CSF spaces are prominent commensurate with the degree of volume loss  No hydrocephalus  No acute extra-axial hemorrhage  VISUALIZED ORBITS: Normal visualized orbits  PARANASAL SINUSES: Mild coastal thickening in the ethmoid sinuses  Visualized paranasal sinuses otherwise appear grossly clear  CALVARIUM AND EXTRACRANIAL SOFT TISSUES:  Normal      Impression: No acute intracranial abnormality is seen  Other findings as above       Incidental Findings:     Test Results Pending at Discharge (will require follow up): "  Outpatient Tests Requested:  · Depakote level, CBC with differential, CMP in 1 week  · TSH and T4 levels in 4 to 6 weeks postdischarge    Complications:      Reason for Admission: Encounter for person awaiting admission to adequate facility elsewhere    Hospital Course:   Mark Souza is a 74 y o  male patient who originally presented to the hospital on 6/20/2023 due to inability to care for self  Patient had recent hospitalization at the beginning of June and a referral was sent to [facility] for additional evaluation as outpatient given his memory loss and cognitive decline  MoCA was performed during this hospitalization and patient scored 8 out of 30  [employer] was notified and patient and family were aware that patient cannot drive at that time  It was recommended patient to 24/7 assistance  Patient returns to the hospital similar issues as prior admission with disruptive behaviors and family unable to care for patient anymore  CT head was obtained with no acute findings  Patient's TSH was noted to be elevated and T4 low  Patient Synthroid dose was increased  Psychiatry was consulted and suggested starting Seroquel  Medication was titrated to help patient agitation and he will be sent on 50 mg twice daily  Patient was also started on Depakote 250 mg nightly  This was increased to 500 mg nightly  Case management and family assisted patient to be placed to [facility] memory care unit  Please see above list of diagnoses and related plan for additional information  Condition at Discharge: good    Discharge Day Visit / Exam:   Subjective: Patient seen and examined  Reports \"I want coffee \"and \"nothing is wrong \"      Vitals: Blood Pressure: 115/74 (06/29/23 1511)  Pulse: 74 (06/29/23 1511)  Temperature: 98 5 °F (36 9 °C) (06/29/23 1511)  Temp Source: Oral (06/29/23 1511)  Respirations: 17 (06/29/23 0727)  Height: 5' 9\" (175 3 cm) (06/20/23 0246)  Weight - Scale: 83 8 kg (184 lb 11 9 oz) " (06/30/23 0600)  SpO2: 96 % (06/28/23 0854)     Exam:   Physical Exam  Vitals and nursing note reviewed  Constitutional:       General: He is not in acute distress  Appearance: He is well-developed  He is not ill-appearing  HENT:      Head: Normocephalic and atraumatic  Cardiovascular:      Rate and Rhythm: Normal rate and regular rhythm  Heart sounds: Normal heart sounds  No murmur heard  Pulmonary:      Effort: Pulmonary effort is normal  No respiratory distress  Breath sounds: Normal breath sounds  Comments: Room air, non labored breathing  Abdominal:      General: Bowel sounds are normal       Palpations: Abdomen is soft  Tenderness: There is no abdominal tenderness  Musculoskeletal:      Right lower leg: No edema  Left lower leg: No edema  Skin:     General: Skin is warm and dry  Neurological:      Mental Status: He is alert  He is disoriented  Psychiatric:      Comments: Labile mood, tearful        Discussion with Family: Updated  (grand daughter) via phone  Discharge instructions/Information to patient and family:   See after visit summary for information provided to patient and family  Provisions for Follow-Up Care:  See after visit summary for information related to follow-up care and any pertinent home health orders  Disposition:   Long Term Acute Care (LTAC) Facility at [address] [place] [address] Readmission: No     Discharge Statement:  I spent 60 minutes discharging the patient  This time was spent on the day of discharge  I had direct contact with the patient on the day of discharge  Greater than 50% of the total time was spent examining patient, answering all patient questions, arranging and discussing plan of care with patient as well as directly providing post-discharge instructions  Additional time then spent on discharge activities      Discharge Medications:  See after visit summary for reconciled discharge medications provided to patient and/or family        **Please Note: This note may have been constructed using a voice recognition system** Nichole NG

## 2023-06-30 NOTE — ASSESSMENT & PLAN NOTE
· Recent hospitalization for similar including unable to care for self with suspicion for progressive dementia  · Psychiatry consulted, appreciate recommendations  · Continue Depakote  mg nightly  · Continue Seroquel 50 mg twice daily  · TSH and T4 as noted below  Increased synthroid dose   B12 and folate within normal limits  · Melatonin 3 mg prn hs  · Suggest obtaining Depakote level, CBC with differential and CMP in 1 week postdischarge

## 2023-07-01 DIAGNOSIS — F39 MOOD DISORDER (HCC): Primary | ICD-10-CM

## 2023-07-03 NOTE — CASE MANAGEMENT
Case Management Progress Note    Patient name Donovan Mills  Location /-17 MRN [id number]  : 1950 Date 7/3/2023       LOS (days): 10  Geometric Mean LOS (GMLOS) (days): 4.70  Days to GMLOS:-5.3        OBJECTIVE:        Current admission status: Inpatient  Preferred Pharmacy:   [address], [address] [facility] [address] [facility]  [address] [facility] [address]  Phone: [phone] Fax: [address] [address]  Phone: [phone] Fax: [address] [place], [place] - [address] [place] [address]  Phone: [phone] Fax: [phone]    Primary Care Provider: Tona Olea DO    Primary Insurance: MEDICARE  Secondary Insurance: AARP    PROGRESS NOTE:    Notification made to OP CM Handoff: [facility] OP CM regarding discharge planning and disposition. CM spoke to Vincent at  in PCP office to inform of dc on  [facility] [employer]

## 2023-07-06 ENCOUNTER — TELEPHONE (OUTPATIENT)
Age: 73
End: 2023-07-06

## 2023-07-06 NOTE — TELEPHONE ENCOUNTER
Left message for patient's daughter, Eusebio Humphreys ([phone]) to say appointments were cancelled due to patient's hospitalization. Request call back to reschedule once patient is discharged.

## 2023-07-06 NOTE — TELEPHONE ENCOUNTER
----- Message from Cece Day, [address] [place] [address] sent at 7/6/2023  9:18 AM EDT -----  Regarding: RE: Recent hospital admission for dementia  It looks like he's at [facility]- would we still need to see him (isn't that skilled memory care)? If so, probably should be a month after being there or being discharged back home. TY  ----- Message -----  From: Brandon Cantu  Sent: 7/6/2023   9:06 AM EDT  To: VEE Gonzalez  Subject: Recent hospital admission for dementia           Raman Pay like patient may be in the hospital for demential related behaviors. If he is discharged prior to his appointment on Monday, 7/10, do you want to see him?

## 2023-10-08 ENCOUNTER — APPOINTMENT (EMERGENCY)
Dept: CT IMAGING | Facility: HOSPITAL | Age: 73
End: 2023-10-08
Payer: MEDICARE

## 2023-10-08 ENCOUNTER — HOSPITAL ENCOUNTER (EMERGENCY)
Facility: HOSPITAL | Age: 73
Discharge: HOME | End: 2023-10-09
Attending: EMERGENCY MEDICINE | Admitting: EMERGENCY MEDICINE
Payer: MEDICARE

## 2023-10-08 ENCOUNTER — APPOINTMENT (EMERGENCY)
Dept: RADIOLOGY | Facility: HOSPITAL | Age: 73
End: 2023-10-08
Payer: MEDICARE

## 2023-10-08 VITALS
DIASTOLIC BLOOD PRESSURE: 77 MMHG | RESPIRATION RATE: 17 BRPM | HEART RATE: 63 BPM | TEMPERATURE: 98 F | SYSTOLIC BLOOD PRESSURE: 136 MMHG | OXYGEN SATURATION: 99 %

## 2023-10-08 DIAGNOSIS — S51.011A LACERATION OF RIGHT ELBOW, INITIAL ENCOUNTER: ICD-10-CM
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
## 2023-10-08 PROCEDURE — 73030 X-RAY EXAM OF SHOULDER: CPT

## 2023-10-08 PROCEDURE — 73552 X-RAY EXAM OF FEMUR 2/>: CPT

## 2023-10-08 PROCEDURE — 72170 X-RAY EXAM OF PELVIS: CPT

## 2023-10-08 PROCEDURE — 12002 RPR S/N/AX/GEN/TRNK2.6-7.5CM: CPT | Performed by: EMERGENCY MEDICINE

## 2023-10-08 PROCEDURE — 99284 EMERGENCY DEPT VISIT MOD MDM: CPT

## 2023-10-08 PROCEDURE — 99285 EMERGENCY DEPT VISIT HI MDM: CPT | Performed by: EMERGENCY MEDICINE

## 2023-10-08 PROCEDURE — 73070 X-RAY EXAM OF ELBOW: CPT

## 2023-10-08 PROCEDURE — 70450 CT HEAD/BRAIN W/O DYE: CPT

## 2023-10-08 RX ORDER — CEPHALEXIN 500 MG/1
500 CAPSULE ORAL EVERY 8 HOURS SCHEDULED
Qty: 15 CAPSULE | Refills: 0 | Status: SHIPPED | OUTPATIENT
Start: 2023-10-08 | End: 2023-10-13

## 2023-10-08 RX ORDER — CEPHALEXIN 250 MG/1
500 CAPSULE ORAL ONCE
Status: COMPLETED | OUTPATIENT
Start: 2023-10-08 | End: 2023-10-08

## 2023-10-08 RX ORDER — CEPHALEXIN 500 MG/1
500 CAPSULE ORAL EVERY 8 HOURS SCHEDULED
Qty: 15 CAPSULE | Refills: 0 | Status: SHIPPED | OUTPATIENT
Start: 2023-10-08 | End: 2023-10-08 | Stop reason: SDUPTHER

## 2023-10-08 RX ADMIN — CEPHALEXIN 500 MG: 250 CAPSULE ORAL at 22:52

## 2023-10-09 NOTE — DISCHARGE INSTRUCTIONS
Of the 3 sutures removed in 7 to 10 days. Come back to the emergency department for spreading redness, pus discharge, fevers. Take the antibiotics for the next 5 days as prescribed. CT scan of the head showed no significant findings. X-rays of the right shoulder, right elbow, right hip and right femur showed no significant findings per my read. Radiology will call you if they see anything differently.

## 2023-10-18 ENCOUNTER — HOSPITAL ENCOUNTER (EMERGENCY)
Facility: HOSPITAL | Age: 73
Discharge: HOME | End: 2023-10-18
Attending: EMERGENCY MEDICINE
Payer: MEDICARE

## 2023-10-18 VITALS
DIASTOLIC BLOOD PRESSURE: 76 MMHG | HEART RATE: 70 BPM | SYSTOLIC BLOOD PRESSURE: 135 MMHG | OXYGEN SATURATION: 99 % | RESPIRATION RATE: 20 BRPM | TEMPERATURE: 98.1 F

## 2023-10-18 DIAGNOSIS — S51.011A ELBOW LACERATION, RIGHT, INITIAL ENCOUNTER: Primary | ICD-10-CM

## 2023-10-18 PROCEDURE — 99283 EMERGENCY DEPT VISIT LOW MDM: CPT

## 2023-10-18 PROCEDURE — 99284 EMERGENCY DEPT VISIT MOD MDM: CPT | Performed by: PHYSICIAN ASSISTANT

## 2023-10-18 RX ORDER — CEPHALEXIN 500 MG/1
500 CAPSULE ORAL EVERY 8 HOURS SCHEDULED
Qty: 15 CAPSULE | Refills: 0 | Status: SHIPPED | OUTPATIENT
Start: 2023-10-18 | End: 2023-10-23

## 2023-10-18 NOTE — DISCHARGE INSTRUCTIONS
Please return to the emergency department for worsening symptoms including chest pain, shortness of breath, dizziness, lightheadedness, fever greater than 103, severe pain, inability to walk, fainting episodes, etc.. Please follow-up with your family practice provider as soon as possible. The patient was evaluated by orthopedics while here in the emergency department. They recommend continued antibiotics which I have prescribed. They also recommend sterile dressings and follow-up with wound care. I have given information to contact wound care and I have also put in an internal referral for wound care. Return to the emergency department for pus drainage or signs of infection.

## 2023-10-18 NOTE — ED PROVIDER NOTES
History  Chief Complaint   Patient presents with    Medical Problem - Re-Evaluation     Pt presents to ed via EMS after the nurse at the nursing home took out the sutures at day number 8 on the right elbow and it has now opened back up      This is a 75-year-old male with past medical history significant for dementia presenting to the emergency department today for a laceration to his right elbow. The patient sustained a laceration approximately 10 days ago which was sutured while here in the emergency department. The patient resides at a nursing care facility who sent the patient in for wound evaluation because the wound has reopened. The patient was placed on Keflex per chart review. Resting home, no fevers or chills. The patient is pleasantly demented and does not participate in history taking. Nursing home denies any drainage from the wound. No other complaints at this time. History provided by:  Patient   used: No    Medical Problem - Re-Evaluation  Severity:  Moderate  Onset quality:  Unable to specify  Timing:  Constant  Progression:  Worsening  Chronicity:  New  Associated symptoms: no fever    Associated symptoms comment:  The patient is not participatory in history taking      Prior to Admission Medications   Prescriptions Last Dose Informant Patient Reported? Taking? QUEtiapine (SEROquel) 50 mg tablet   No No   Sig: Take 1 tablet (50 mg total) by mouth every 12 (twelve) hours   cyanocobalamin (VITAMIN B-12) 1000 MCG tablet   No No   Sig: Take 1 tablet (1,000 mcg total) by mouth daily Do not start before 2023.    divalproex sodium (DEPAKOTE ER) 500 mg 24 hr tablet   No No   Sig: Take 1 tablet (500 mg total) by mouth daily at bedtime   ferrous sulfate 325 (65 Fe) mg tablet   No No   Sig: Take 1 tablet (325 mg total) by mouth daily with breakfast   fluticasone (FLONASE) 50 mcg/act nasal spray   No No   Si spray into each nostril daily   latanoprost (XALATAN) 0.005 % ophthalmic solution   Yes No   Sig: place 1 drop into both eyes at bedtime   levothyroxine 150 mcg tablet   No No   Sig: Take 1 tablet (150 mcg total) by mouth daily in the early morning Do not start before 2023. lorazepam (ATIVAN) 0.5 mg/mL GEL transdermal gel   No No   Sig: Place 1 mL (0.5 mg total) on the skin 2 (two) times a day as needed (breakthrough anxiety)   lovastatin (MEVACOR) 40 MG tablet   No No   Sig: Take 2 tablets (80 mg total) by mouth every 24 hours   melatonin 3 mg   No No   Sig: Take 1 tablet (3 mg total) by mouth daily at bedtime as needed (agitation/difficulty sleeping)      Facility-Administered Medications: None       Past Medical History:   Diagnosis Date    Disease of thyroid gland     Hyperlipidemia     Hypertension        Past Surgical History:   Procedure Laterality Date    HERNIA REPAIR      REPLACEMENT TOTAL KNEE BILATERAL         Family History   Family history unknown: Yes     I have reviewed and agree with the history as documented. E-Cigarette/Vaping    E-Cigarette Use Never User      E-Cigarette/Vaping Substances     Social History     Tobacco Use    Smoking status: Former     Types: Cigarettes     Quit date: 10/23/2009     Years since quittin.9    Smokeless tobacco: Never   Vaping Use    Vaping Use: Never used   Substance Use Topics    Alcohol use: Not Currently     Comment: no drinks in at least 6 months    Drug use: Never       Review of Systems   Unable to perform ROS: Dementia   Constitutional:  Negative for chills and fever. Skin:  Positive for wound. Psychiatric/Behavioral:  Positive for confusion (baseline dementia). All other systems reviewed and are negative. Physical Exam  Physical Exam  Vitals and nursing note reviewed. Constitutional:       General: He is not in acute distress. Appearance: Normal appearance. He is normal weight. He is not ill-appearing, toxic-appearing or diaphoretic.    HENT:      Head: Normocephalic and atraumatic. Nose: Nose normal. No congestion or rhinorrhea. Mouth/Throat:      Mouth: Mucous membranes are moist.      Pharynx: No oropharyngeal exudate or posterior oropharyngeal erythema. Eyes:      General: No scleral icterus. Right eye: No discharge. Left eye: No discharge. Conjunctiva/sclera: Conjunctivae normal.   Cardiovascular:      Rate and Rhythm: Normal rate and regular rhythm. Pulses: Normal pulses. Heart sounds: Normal heart sounds. No murmur heard. No friction rub. No gallop. Pulmonary:      Effort: Pulmonary effort is normal. No respiratory distress. Breath sounds: Normal breath sounds. No stridor. No wheezing, rhonchi or rales. Chest:      Chest wall: No tenderness. Musculoskeletal:         General: Normal range of motion. Cervical back: Normal range of motion. No rigidity. Right lower leg: No edema. Left lower leg: No edema. Comments: Patient moves right upper extremity without difficulty   Skin:     General: Skin is warm and dry. Capillary Refill: Capillary refill takes less than 2 seconds. Coloration: Skin is not jaundiced or pale. Comments: Laceration noted to the right elbow; wound appears opening and I am able to see underlying bone; sutures completely removed; there is no redness, swelling, or evidence of surrounding cellulitis; there is no purulent drainage from the wound; wound appears noninfected   Neurological:      General: No focal deficit present. Mental Status: He is alert. Mental status is at baseline.    Psychiatric:         Mood and Affect: Mood normal.         Behavior: Behavior normal.         Vital Signs  ED Triage Vitals   Temperature Pulse Respirations Blood Pressure SpO2   10/18/23 0851 10/18/23 0851 10/18/23 0851 10/18/23 0851 10/18/23 0851   98.1 °F (36.7 °C) 66 18 135/77 98 %      Temp Source Heart Rate Source Patient Position - Orthostatic VS BP Location FiO2 (%)   10/18/23 0851 10/18/23 0851 10/18/23 0851 10/18/23 0851 --   Oral Monitor Sitting Left arm       Pain Score       10/18/23 1032       No Pain           Vitals:    10/18/23 0851 10/18/23 1032   BP: 135/77 135/76   Pulse: 66 70   Patient Position - Orthostatic VS: Sitting Sitting         Visual Acuity      ED Medications  Medications - No data to display    Diagnostic Studies  Results Reviewed       None                   No orders to display              Procedures  Procedures         ED Course                               SBIRT 22yo+      Flowsheet Row Most Recent Value   Initial Alcohol Screen: US AUDIT-C     1. How often do you have a drink containing alcohol? 0 Filed at: 10/18/2023 0839   2. How many drinks containing alcohol do you have on a typical day you are drinking? 0 Filed at: 10/18/2023 0839   3a. Male UNDER 65: How often do you have five or more drinks on one occasion? 0 Filed at: 10/18/2023 0839   3b. FEMALE Any Age, or MALE 65+: How often do you have 4 or more drinks on one occassion? 0 Filed at: 10/18/2023 0839   Audit-C Score 0 Filed at: 10/18/2023 9064   MECHELLE: How many times in the past year have you. .. Used an illegal drug or used a prescription medication for non-medical reasons? Never Filed at: 10/18/2023 2527                      Medical Decision Making  78-year-old male presenting to the emergency department today for a wound reevaluation. Patient sustained a laceration approximately 10 days ago. Per nursing home, wound has reopened. Patient has had no signs or symptoms of infection. Vitals are stable. Afebrile. Patient does not participate in history or physical examination and is significantly demented but pleasant. On physical examination, the patient has a laceration which has opened to the right elbow. There is underlying bone exposure. Wound does not appear infectious. Patient moves right upper extremity without difficulty. Prior elbow x-ray was reviewed that showed no fractures.   I sent pictures and discussed the case with Alberto Sosa PA-C with orthopedics; he notes that given the way the wound looks the patient should have a sterile dressing placed over top of the wound and should follow-up with wound care. No acute urgent intervention necessary at this time. The patient is stable for discharge at this time. Continue sterile wound dressings at nursing facility. Referral placed for wound management. Strict return precautions were given. Recommend PCP follow-up as soon as possible. The patient and/or patient's proxy verify their understanding and agree to the plan at this time. All questions answered to the patient and/or their proxy's satisfaction. All labs reviewed and utilized in the medical decision making process (if labs were ordered). Portions of the record may have been created with voice recognition software. Occasional wrong word or "sound a like" substitutions may have occurred due to the inherent limitations of voice recognition software. Read the chart carefully and recognize, using context, where substitutions have occurred. I reviewed prior notes and imaging. Problems Addressed:  Elbow laceration, right, initial encounter: complicated acute illness or injury    Amount and/or Complexity of Data Reviewed  External Data Reviewed: radiology and notes. Discussion of management or test interpretation with external provider(s): Alberto Sosa PA-C - Orthopaedics    Risk  Prescription drug management.              Disposition  Final diagnoses:   Elbow laceration, right, initial encounter     Time reflects when diagnosis was documented in both MDM as applicable and the Disposition within this note       Time User Action Codes Description Comment    10/18/2023  9:15 AM Florentino West Add [S51.011A] Elbow laceration, right, initial encounter           ED Disposition       ED Disposition   Discharge    Condition   Stable    Date/Time   Wed Oct 18, 2023 0952 Comment   Kayce Card discharge to home/self care.                    Follow-up Information       Follow up With Specialties Details Why Contact Info Additional Information    Lorie Hallman DO Family Medicine Schedule an appointment as soon as possible for a visit   [address] [facility] [address] [facility] Emergency Department Emergency Medicine Go to  If symptoms worsen [address] [phone] [address] [facility] Emergency Department, [facility]aw[facility]    [address] [facility] Wound Care Call   [address] [address]  [facility], [address] [place] [address] Route , [facility], [address]            Discharge Medication List as of 10/18/2023  9:19 AM        START taking these medications    Details   cephalexin (KEFLEX) 500 mg capsule Take 1 capsule (500 mg total) by mouth every 8 (eight) hours for 5 days, Starting Wed 10/18/2023, Until Mon 10/23/2023, [place]           CONTINUE these medications which have NOT CHANGED    Details   cyanocobalamin (VITAMIN B-12) 1000 MCG tablet Take 1 tablet (1,000 mcg total) by mouth daily Do not start before June 30, 2023., Starting Fri 6/30/2023, [place]      divalproex sodium (DEPAKOTE ER) 500 mg 24 hr tablet Take 1 tablet (500 mg total) by mouth daily at bedtime, Starting Thu 6/29/2023, [place]      ferrous sulfate 325 (65 Fe) mg tablet Take 1 tablet (325 mg total) by mouth daily with breakfast, Starting Tue 6/13/2023, No Print      fluticasone (FLONASE) 50 mcg/act nasal spray 1 spray into each nostril daily, Starting Sun 3/20/2022, [place]      latanoprost (XALATAN) 0.005 % ophthalmic solution place 1 drop into both eyes at bedtime, Historical Med      levothyroxine 150 mcg tablet Take 1 tablet (150 mcg total) by mouth daily in the early morning Do not start before June 30, 2023., Starting Fri 6/30/2023, [place]      lorazepam (ATIVAN) 0.5 mg/mL GEL transdermal gel Place 1 mL (0.5 mg total) on the skin 2 (two) times a day as needed (breakthrough anxiety), Starting Sat 7/1/2023, [place]      lovastatin (MEVACOR) 40 MG tablet Take 2 tablets (80 mg total) by mouth every 24 hours, Starting Tue 6/13/2023, No Print      melatonin 3 mg Take 1 tablet (3 mg total) by mouth daily at bedtime as needed (agitation/difficulty sleeping), Starting Thu 6/29/2023, [place]      QUEtiapine (SEROquel) 50 mg tablet Take 1 tablet (50 mg total) by mouth every 12 (twelve) hours, Starting Thu 6/29/2023, [place]                 PDMP Review         Value Time User    PDMP Reviewed  Yes 7/1/2023  8:04 PM Danielle Loya MD            ED Provider  Electronically Signed by             Dominick Dupree PA-C  10/18/23 1412

## 2023-10-28 ENCOUNTER — APPOINTMENT (EMERGENCY)
Dept: RADIOLOGY | Facility: HOSPITAL | Age: 73
End: 2023-10-28
Payer: MEDICARE

## 2023-10-28 ENCOUNTER — HOSPITAL ENCOUNTER (EMERGENCY)
Facility: HOSPITAL | Age: 73
Discharge: HOME | End: 2023-10-28
Attending: INTERNAL MEDICINE
Payer: MEDICARE

## 2023-10-28 VITALS
DIASTOLIC BLOOD PRESSURE: 79 MMHG | RESPIRATION RATE: 16 BRPM | HEART RATE: 64 BPM | TEMPERATURE: 97.8 F | SYSTOLIC BLOOD PRESSURE: 140 MMHG | OXYGEN SATURATION: 99 %

## 2023-10-28 DIAGNOSIS — R05.9 COUGH, UNSPECIFIED TYPE: Primary | ICD-10-CM

## 2023-10-28 LAB
ALBUMIN SERPL BCP-MCNC: 3.9 G/DL (ref 3.5–5)
ALP SERPL-CCNC: 78 U/L (ref 34–104)
ALT SERPL W P-5'-P-CCNC: 12 U/L (ref 7–52)
ANION GAP SERPL CALCULATED.3IONS-SCNC: 6 MMOL/L
AST SERPL W P-5'-P-CCNC: 12 U/L (ref 13–39)
BASOPHILS # BLD AUTO: 0.04 THOUSANDS/ÂΜL (ref 0–0.1)
BASOPHILS NFR BLD AUTO: 1 % (ref 0–1)
BILIRUB SERPL-MCNC: 0.36 MG/DL (ref 0.2–1)
BUN SERPL-MCNC: 20 MG/DL (ref 5–25)
CALCIUM SERPL-MCNC: 8.9 MG/DL (ref 8.4–10.2)
CARDIAC TROPONIN I PNL SERPL HS: 3 NG/L (ref 8–18)
CHLORIDE SERPL-SCNC: 106 MMOL/L (ref 96–108)
CO2 SERPL-SCNC: 30 MMOL/L (ref 21–32)
CREAT SERPL-MCNC: 0.8 MG/DL (ref 0.6–1.3)
EOSINOPHIL # BLD AUTO: 0.22 THOUSAND/ÂΜL (ref 0–0.61)
EOSINOPHIL NFR BLD AUTO: 4 % (ref 0–6)
ERYTHROCYTE [DISTWIDTH] IN BLOOD BY AUTOMATED COUNT: 14.6 % (ref 11.6–15.1)
GFR SERPL CREATININE-BSD FRML MDRD: 87 ML/MIN/1.73SQ M
GLUCOSE SERPL-MCNC: 129 MG/DL (ref 65–140)
HCT VFR BLD AUTO: 39 % (ref 36.5–49.3)
HGB BLD-MCNC: 12.6 G/DL (ref 12–17)
IMM GRANULOCYTES # BLD AUTO: 0.01 THOUSAND/UL (ref 0–0.2)
IMM GRANULOCYTES NFR BLD AUTO: 0 % (ref 0–2)
LYMPHOCYTES # BLD AUTO: 1.11 THOUSANDS/ÂΜL (ref 0.6–4.47)
LYMPHOCYTES NFR BLD AUTO: 19 % (ref 14–44)
MCH RBC QN AUTO: 30 PG (ref 26.8–34.3)
MCHC RBC AUTO-ENTMCNC: 32.3 G/DL (ref 31.4–37.4)
MCV RBC AUTO: 93 FL (ref 82–98)
MONOCYTES # BLD AUTO: 0.6 THOUSAND/ÂΜL (ref 0.17–1.22)
MONOCYTES NFR BLD AUTO: 10 % (ref 4–12)
NEUTROPHILS # BLD AUTO: 3.84 THOUSANDS/ÂΜL (ref 1.85–7.62)
NEUTS SEG NFR BLD AUTO: 66 % (ref 43–75)
NRBC BLD AUTO-RTO: 0 /100 WBCS
PLATELET # BLD AUTO: 220 THOUSANDS/UL (ref 149–390)
PMV BLD AUTO: 10.5 FL (ref 8.9–12.7)
POTASSIUM SERPL-SCNC: 4.2 MMOL/L (ref 3.5–5.3)
PROT SERPL-MCNC: 7.1 G/DL (ref 6.4–8.4)
RBC # BLD AUTO: 4.2 MILLION/UL (ref 3.88–5.62)
SODIUM SERPL-SCNC: 142 MMOL/L (ref 135–147)
WBC # BLD AUTO: 5.82 THOUSAND/UL (ref 4.31–10.16)

## 2023-10-28 PROCEDURE — 93005 ELECTROCARDIOGRAM TRACING: CPT

## 2023-10-28 PROCEDURE — 84484 ASSAY OF TROPONIN QUANT: CPT | Performed by: INTERNAL MEDICINE

## 2023-10-28 PROCEDURE — 85025 COMPLETE CBC W/AUTO DIFF WBC: CPT | Performed by: INTERNAL MEDICINE

## 2023-10-28 PROCEDURE — 71045 X-RAY EXAM CHEST 1 VIEW: CPT

## 2023-10-28 PROCEDURE — 80053 COMPREHEN METABOLIC PANEL: CPT | Performed by: INTERNAL MEDICINE

## 2023-10-28 PROCEDURE — 99284 EMERGENCY DEPT VISIT MOD MDM: CPT

## 2023-10-28 PROCEDURE — 99285 EMERGENCY DEPT VISIT HI MDM: CPT | Performed by: INTERNAL MEDICINE

## 2023-10-28 PROCEDURE — 36415 COLL VENOUS BLD VENIPUNCTURE: CPT | Performed by: INTERNAL MEDICINE

## 2023-10-28 NOTE — DISCHARGE INSTRUCTIONS
Follow up with your PMD  Labs Reviewed   COMPREHENSIVE METABOLIC PANEL - Abnormal       Result Value Ref Range Status    Sodium 142  135 - 147 mmol/L Final    Potassium 4.2  3.5 - 5.3 mmol/L Final    Chloride 106  96 - 108 mmol/L Final    CO2 30  21 - 32 mmol/L Final    ANION GAP 6  mmol/L Final    BUN 20  5 - 25 mg/dL Final    Creatinine 0.80  0.60 - 1.30 mg/dL Final    Comment: Standardized to IDMS reference method    Glucose 129  65 - 140 mg/dL Final    Comment: If the patient is fasting, the ADA then defines impaired fasting glucose as > 100 mg/dL and diabetes as > or equal to 123 mg/dL. Calcium 8.9  8.4 - 10.2 mg/dL Final    AST 12 (*) 13 - 39 U/L Final    ALT 12  7 - 52 U/L Final    Comment: Specimen collection should occur prior to Sulfasalazine administration due to the potential for falsely depressed results. Alkaline Phosphatase 78  34 - 104 U/L Final    Total Protein 7.1  6.4 - 8.4 g/dL Final    Albumin 3.9  3.5 - 5.0 g/dL Final    Total Bilirubin 0.36  0.20 - 1.00 mg/dL Final    Comment: Use of this assay is not recommended for patients undergoing treatment with eltrombopag due to the potential for falsely elevated results. N-acetyl-p-benzoquinone imine (metabolite of Acetaminophen) will generate erroneously low results in samples for patients that have taken an overdose of Acetaminophen.     eGFR 87  ml/min/1.73sq m Final    Narrative:     [place] guidelines for Chronic Kidney Disease (CKD):     Stage 1 with normal or high GFR (GFR > 90 mL/min/1.73 square meters)    Stage 2 Mild CKD (GFR = 60-89 mL/min/1.73 square meters)    Stage 3A Moderate CKD (GFR = 45-59 mL/min/1.73 square meters)    Stage 3B Moderate CKD (GFR = 30-44 mL/min/1.73 square meters)    Stage 4 Severe CKD (GFR = 15-29 mL/min/1.73 square meters)    Stage 5 End Stage CKD (GFR <15 mL/min/1.73 square meters)  Note: GFR calculation is accurate only with a steady state creatinine   HIGH SENSITIVITY TROPONIN I RANDOM - Abnormal    HS TnI random 3 (*) 8 - 18 ng/L Final    Comment:                                              Initial (time 0) result  If >=50 ng/L, Myocardial injury suggested ;  Type of myocardial injury and treatment strategy  to be determined. If 5-49 ng/L, a delta result at 2 hours and or 4 hours will be needed to further evaluate. If <4 ng/L, and chest pain has been >3 hours since onset, patient may qualify for discharge based on the HEART score in the ED. If <5 ng/L and <3hours since onset of chest pain, a delta result at 2 hours will be needed to further evaluate. HS Troponin 99th Percentile URL of a Health Population=12 ng/L with a 95% Confidence Interval of 8-18 ng/L. Second Troponin (time 2 hours)  If calculated delta >= 20 ng/L,  Myocardial injury suggested ; Type of myocardial injury and treatment strategy to be determined. If 5-49 ng/L and the calculated delta is 5-19 ng/L, consult medical service for evaluation. Continue evaluation for ischemia on ecg and other possible etiology and repeat hs troponin at 4 hours. If delta is <5 ng/L at 2 hours, consider discharge based on risk stratification via the HEART score (if in ED), or LYNNE risk score in IP/Observation.     HS Troponin 99th Percentile URL of a Health Population=12 ng/L with a 95% Confidence Interval of 8-18 ng/L.   CBC AND DIFFERENTIAL    WBC 5.82  4.31 - 10.16 Thousand/uL Final    RBC 4.20  3.88 - 5.62 Million/uL Final    Hemoglobin 12.6  12.0 - 17.0 g/dL Final    Hematocrit 39.0  36.5 - 49.3 % Final    MCV 93  82 - 98 fL Final    MCH 30.0  26.8 - 34.3 pg Final    MCHC 32.3  31.4 - 37.4 g/dL Final    RDW 14.6  11.6 - 15.1 % Final    MPV 10.5  8.9 - 12.7 fL Final    Platelets 949  195 - 390 Thousands/uL Final    nRBC 0  /100 WBCs Final    Neutrophils Relative 66  43 - 75 % Final    Immat GRANS % 0  0 - 2 % Final    Lymphocytes Relative 19  14 - 44 % Final    Monocytes Relative 10  4 - 12 % Final    Eosinophils Relative 4  0 - 6 % Final    Basophils Relative 1  0 - 1 % Final    Neutrophils Absolute 3.84  1.85 - 7.62 Thousands/µL Final    Immature Grans Absolute 0.01  0.00 - 0.20 Thousand/uL Final    Lymphocytes Absolute 1.11  0.60 - 4.47 Thousands/µL Final    Monocytes Absolute 0.60  0.17 - 1.22 Thousand/µL Final    Eosinophils Absolute 0.22  0.00 - 0.61 Thousand/µL Final    Basophils Absolute 0.04  0.00 - 0.10 Thousands/µL Final     XR chest portable   ED Interpretation   CXR; NO acute cardiopulmonary findings

## 2023-10-28 NOTE — ED PROVIDER NOTES
History  Chief Complaint   Patient presents with    Cough     Brought in by EMS, pt was eating when he began coughing, unsure if he aspirated. Pt in no acute distress upon arrival     This is 74years old sent from nursing home as patient was coughing while he was eating. Patient was sent in to rule out aspiration pneumonia. Replacing home his pulse ox was 93% on room air. On his arrival to the ER his pulse ox is 90 to 99% on room air. Patient has no fever. Patient has no SOB, CP, abdominal pain, nausea vomiting diarrhea. Patient has history of vascular dementia and hypertension, hyperlipidemia. Hypothyroidism. Prior to Admission Medications   Prescriptions Last Dose Informant Patient Reported? Taking? QUEtiapine (SEROquel) 50 mg tablet   No No   Sig: Take 1 tablet (50 mg total) by mouth every 12 (twelve) hours   cyanocobalamin (VITAMIN B-12) 1000 MCG tablet   No No   Sig: Take 1 tablet (1,000 mcg total) by mouth daily Do not start before 2023. divalproex sodium (DEPAKOTE ER) 500 mg 24 hr tablet   No No   Sig: Take 1 tablet (500 mg total) by mouth daily at bedtime   ferrous sulfate 325 (65 Fe) mg tablet   No No   Sig: Take 1 tablet (325 mg total) by mouth daily with breakfast   fluticasone (FLONASE) 50 mcg/act nasal spray   No No   Si spray into each nostril daily   latanoprost (XALATAN) 0.005 % ophthalmic solution   Yes No   Sig: place 1 drop into both eyes at bedtime   levothyroxine 150 mcg tablet   No No   Sig: Take 1 tablet (150 mcg total) by mouth daily in the early morning Do not start before 2023.    lorazepam (ATIVAN) 0.5 mg/mL GEL transdermal gel   No No   Sig: Place 1 mL (0.5 mg total) on the skin 2 (two) times a day as needed (breakthrough anxiety)   lovastatin (MEVACOR) 40 MG tablet   No No   Sig: Take 2 tablets (80 mg total) by mouth every 24 hours   melatonin 3 mg   No No   Sig: Take 1 tablet (3 mg total) by mouth daily at bedtime as needed (agitation/difficulty sleeping)      Facility-Administered Medications: None       Past Medical History:   Diagnosis Date    Disease of thyroid gland     Hyperlipidemia     Hypertension        Past Surgical History:   Procedure Laterality Date    HERNIA REPAIR      REPLACEMENT TOTAL KNEE BILATERAL         Family History   Family history unknown: Yes     I have reviewed and agree with the history as documented. E-Cigarette/Vaping    E-Cigarette Use Never User      E-Cigarette/Vaping Substances     Social History     Tobacco Use    Smoking status: Former     Types: Cigarettes     Quit date: 10/23/2009     Years since quittin.0    Smokeless tobacco: Never   Vaping Use    Vaping Use: Never used   Substance Use Topics    Alcohol use: Not Currently     Comment: no drinks in at least 6 months    Drug use: Never       Review of Systems   Constitutional:  Negative for chills and fever. HENT:  Negative for congestion, ear pain, nosebleeds, rhinorrhea, sinus pressure, sinus pain and sore throat. Eyes:  Negative for pain and visual disturbance. Respiratory:  Positive for cough. Negative for shortness of breath. Cardiovascular:  Negative for chest pain and palpitations. Gastrointestinal:  Negative for abdominal pain and vomiting. Genitourinary:  Negative for dysuria and hematuria. Musculoskeletal:  Negative for arthralgias and back pain. Skin:  Negative for color change and rash. Neurological:  Negative for seizures and syncope. All other systems reviewed and are negative. Physical Exam  Physical Exam  Vitals and nursing note reviewed. Constitutional:       General: He is not in acute distress. Appearance: He is well-developed. He is obese. He is not ill-appearing, toxic-appearing or diaphoretic. HENT:      Head: Normocephalic and atraumatic. Right Ear: Ear canal normal.      Left Ear: Ear canal normal.      Nose: Nose normal. No congestion or rhinorrhea.       Mouth/Throat:      Pharynx: No oropharyngeal exudate or posterior oropharyngeal erythema. Eyes:      Extraocular Movements: Extraocular movements intact. Pupils: Pupils are equal, round, and reactive to light. Neck:      Vascular: No carotid bruit. Cardiovascular:      Rate and Rhythm: Normal rate and regular rhythm. Heart sounds: Normal heart sounds. No murmur heard. No friction rub. No gallop. Pulmonary:      Effort: No respiratory distress. Breath sounds: No stridor. No wheezing, rhonchi or rales. Comments: Bilateral wheezing in all fields , and bilateral rales up to half of chest .  Chest:      Chest wall: No tenderness. Abdominal:      General: Bowel sounds are normal. There is no distension. Palpations: Abdomen is soft. There is no mass. Tenderness: There is no abdominal tenderness. There is no right CVA tenderness, left CVA tenderness, guarding or rebound. Hernia: No hernia is present. Musculoskeletal:         General: No swelling, tenderness, deformity or signs of injury. Normal range of motion. Cervical back: Normal range of motion and neck supple. No rigidity or tenderness. Right lower leg: No edema. Left lower leg: No edema. Lymphadenopathy:      Cervical: No cervical adenopathy. Skin:     General: Skin is warm and dry. Capillary Refill: Capillary refill takes less than 2 seconds. Coloration: Skin is not jaundiced or pale. Findings: No bruising, erythema, lesion or rash. Neurological:      Mental Status: He is alert and oriented to person, place, and time.    Psychiatric:         Behavior: Behavior normal.         Vital Signs  ED Triage Vitals [10/28/23 1519]   Temperature Pulse Respirations Blood Pressure SpO2   97.8 °F (36.6 °C) 65 20 131/76 100 %      Temp Source Heart Rate Source Patient Position - Orthostatic VS BP Location FiO2 (%)   Oral Monitor Sitting Right arm --      Pain Score       --           Vitals:    10/28/23 1519 10/28/23 1600 10/28/23 1700   BP: 131/76 132/74 140/79   Pulse: 65 63 64   Patient Position - Orthostatic VS: Sitting           Visual Acuity      ED Medications  Medications - No data to display    Diagnostic Studies  Results Reviewed       Procedure Component Value Units Date/Time    High Sensitivity Troponin I Random [[id number]]  (Abnormal) Collected: 10/28/23 1558    Lab Status: Final result Specimen: Blood from Arm, Left Updated: 10/28/23 1626     HS TnI random 3 ng/L     Comprehensive metabolic panel [[id number]]  (Abnormal) Collected: 10/28/23 1558    Lab Status: Final result Specimen: Blood from Arm, Left Updated: 10/28/23 1623     Sodium 142 mmol/L      Potassium 4.2 mmol/L      Chloride 106 mmol/L      CO2 30 mmol/L      ANION GAP 6 mmol/L      BUN 20 mg/dL      Creatinine 0.80 mg/dL      Glucose 129 mg/dL      Calcium 8.9 mg/dL      AST 12 U/L      ALT 12 U/L      Alkaline Phosphatase 78 U/L      Total Protein 7.1 g/dL      Albumin 3.9 g/dL      Total Bilirubin 0.36 mg/dL      eGFR 87 ml/min/1.73sq m     Narrative:      [facility] Kid[facility] Disease (CKD):     Stage 1 with normal or high GFR (GFR > 90 mL/min/1.73 square meters)    Stage 2 Mild CKD (GFR = 60-89 mL/min/1.73 square meters)    Stage 3A Moderate CKD (GFR = 45-59 mL/min/1.73 square meters)    Stage 3B Moderate CKD (GFR = 30-44 mL/min/1.73 square meters)    Stage 4 Severe CKD (GFR = 15-29 mL/min/1.73 square meters)    Stage 5 End Stage CKD (GFR <15 mL/min/1.73 square meters)  Note: GFR calculation is accurate only with a steady state creatinine    CBC and differential [[id number]] Collected: 10/28/23 1539    Lab Status: Final result Specimen: Blood from Arm, Left Updated: 10/28/23 1544     WBC 5.82 Thousand/uL      RBC 4.20 Million/uL      Hemoglobin 12.6 g/dL      Hematocrit 39.0 %      MCV 93 fL      MCH 30.0 pg      MCHC 32.3 g/dL      RDW 14.6 %      MPV 10.5 fL      Platelets 682 Thousands/uL      nRBC 0 /100 WBCs Neutrophils Relative 66 %      Immat GRANS % 0 %      Lymphocytes Relative 19 %      Monocytes Relative 10 %      Eosinophils Relative 4 %      Basophils Relative 1 %      Neutrophils Absolute 3.84 Thousands/µL      Immature Grans Absolute 0.01 Thousand/uL      Lymphocytes Absolute 1.11 Thousands/µL      Monocytes Absolute 0.60 Thousand/µL      Eosinophils Absolute 0.22 Thousand/µL      Basophils Absolute 0.04 Thousands/µL                    XR chest portable   ED Interpretation by Gerda Levy MD (10/28 5241)   CXR; NO acute cardiopulmonary findings      Final Result by Carlin Rader MD (10/29 1208)      No acute cardiopulmonary disease. Stable moderate size hiatal hernia. Workstation performed: [id number]                    Procedures  ECG 12 Lead Documentation Only    Date/Time: 10/28/2023 3:52 PM    Performed by: Gerda Levy MD  Authorized by: Gerda Levy MD    Indications / Diagnosis:  Cough  ECG reviewed by me, the ED Provider: yes    Patient location:  ED and bedside  Previous ECG:     Previous ECG:  Compared to current    Similarity:  No change  Interpretation:     Interpretation: normal    Rate:     ECG rate:  63    ECG rate assessment: normal    Rhythm:     Rhythm: sinus rhythm    Ectopy:     Ectopy: none    QRS:     QRS axis:  Normal    QRS intervals:  Normal  Conduction:     Conduction: normal    ST segments:     ST segments:  Normal  T waves:     T waves: normal             ED Course                                             Medical Decision Making  This is 74y old came for having cough while he was eating , and pt sent from NH to R/O aspiration pneumonia. Pt has no other symptoms , and denies CP, SOB, fever, abdominal pain. pt has PO 99% on arrival to er. Physical exam shows no pertinent positive findings. EKG; Sinus rhythm rate 63/min, no ischemic changes. Labs reviewed  include CBC, CMP are wnl. CXR; NO acute cardiopulmonary findings.  Pt did not cough since he came to er. Pt has no pneumonia and no evidence of aspiration. Pt to be discharged home and follow up with his PMD.    Amount and/or Complexity of Data Reviewed  Labs: ordered. Details: CBC-CMP are WNL  Radiology: ordered and independent interpretation performed. Details: CXR; NO acute cardiopulmonary findings  ECG/medicine tests: ordered and independent interpretation performed. Details: EKG; Sinus rhythm rate 63/min, no ischemic changes. Disposition  Final diagnoses:   Cough, unspecified type     Time reflects when diagnosis was documented in both MDM as applicable and the Disposition within this note       Time User Action Codes Description Comment    10/28/2023  5:16 PM Sadie Hastings [R05.9] Cough, unspecified type           ED Disposition       ED Disposition   Discharge    Condition   Stable    Date/Time   Sat Oct 28, 2023  5:16 PM    Comment   Melia Waite discharge to home/self care.                    Follow-up Information       Follow up With Specialties Details Why [id number] [place], DO Family Medicine In 2 days  [facility]  [phone]              Discharge Medication List as of 10/28/2023  5:18 PM        CONTINUE these medications which have NOT CHANGED    Details   cyanocobalamin (VITAMIN B-12) 1000 MCG tablet Take 1 tablet (1,000 mcg total) by mouth daily Do not start before June 30, 2023., Starting Fri 6/30/2023, Normal      divalproex sodium (DEPAKOTE ER) 500 mg 24 hr tablet Take 1 tablet (500 mg total) by mouth daily at bedtime, Starting Thu 6/29/2023, Normal      ferrous sulfate 325 (65 Fe) mg tablet Take 1 tablet (325 mg total) by mouth daily with breakfast, Starting Tue 6/13/2023, No Print      fluticasone (FLONASE) 50 mcg/act nasal spray 1 spray into each nostril daily, Starting Sun 3/20/2022, Normal      latanoprost (XALATAN) 0.005 % ophthalmic solution place 1 drop into both eyes at bedtime, Historical Med      levothyroxine 150 mcg tablet Take 1 tablet (150 mcg total) by mouth daily in the early morning Do not start before June 30, 2023., Starting Fri 6/30/2023, Normal      lorazepam (ATIVAN) 0.5 mg/mL GEL transdermal gel Place 1 mL (0.5 mg total) on the skin 2 (two) times a day as needed (breakthrough anxiety), Starting Sat 7/1/2023, Normal      lovastatin (MEVACOR) 40 MG tablet Take 2 tablets (80 mg total) by mouth every 24 hours, Starting Tue 6/13/2023, No Print      melatonin 3 mg Take 1 tablet (3 mg total) by mouth daily at bedtime as needed (agitation/difficulty sleeping), Starting Thu 6/29/2023, Normal      QUEtiapine (SEROquel) 50 mg tablet Take 1 tablet (50 mg total) by mouth every 12 (twelve) hours, Starting Thu 6/29/2023, Normal             No discharge procedures on file.     PDMP Review         Value Time User    PDMP Reviewed  Yes 7/1/2023  8:04 PM Holly Gandhi MD            ED Provider  Electronically Signed by             Willie Waterman MD  10/29/23 9421

## 2023-10-29 LAB
ATRIAL RATE: 63 BPM
P AXIS: 63 DEGREES
PR INTERVAL: 202 MS
QRS AXIS: 30 DEGREES
QRSD INTERVAL: 84 MS
QT INTERVAL: 412 MS
QTC INTERVAL: 421 MS
T WAVE AXIS: 51 DEGREES
VENTRICULAR RATE: 63 BPM

## 2023-10-29 PROCEDURE — 93010 ELECTROCARDIOGRAM REPORT: CPT | Performed by: INTERNAL MEDICINE

## 2024-07-01 ENCOUNTER — APPOINTMENT (EMERGENCY)
Dept: CT IMAGING | Facility: HOSPITAL | Age: 74
End: 2024-07-01
Payer: MEDICARE

## 2024-07-01 ENCOUNTER — HOSPITAL ENCOUNTER (INPATIENT)
Facility: HOSPITAL | Age: 74
LOS: 1 days | Discharge: NURSING, REHAB OR LONG-TERM CARE | DRG: 086 | End: 2024-07-02
Attending: SURGERY | Admitting: SURGERY
Payer: MEDICARE

## 2024-07-01 ENCOUNTER — HOSPITAL ENCOUNTER (EMERGENCY)
Facility: HOSPITAL | Age: 74
End: 2024-07-01
Attending: EMERGENCY MEDICINE
Payer: MEDICARE

## 2024-07-01 VITALS
RESPIRATION RATE: 16 BRPM | HEART RATE: 72 BPM | SYSTOLIC BLOOD PRESSURE: 140 MMHG | TEMPERATURE: 98.8 F | DIASTOLIC BLOOD PRESSURE: 82 MMHG | OXYGEN SATURATION: 96 %
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
## 2024-07-01 DIAGNOSIS — I62.00 SUBDURAL HEMORRHAGE (HCC): ICD-10-CM

## 2024-07-01 DIAGNOSIS — R41.81 AGE-RELATED COGNITIVE DECLINE: ICD-10-CM

## 2024-07-01 DIAGNOSIS — S06.5XAA TRAUMATIC SUBDURAL HEMATOMA (HCC): ICD-10-CM

## 2024-07-01 DIAGNOSIS — S06.5XAA SUBDURAL HEMATOMA (HCC): Primary | ICD-10-CM
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
## 2024-07-01 LAB
ANION GAP SERPL CALCULATED.3IONS-SCNC: 7 MMOL/L (ref 4–13)
APTT PPP: 31 SECONDS (ref 23–37)
BASOPHILS # BLD AUTO: 0.03 THOUSANDS/ÂΜL (ref 0–0.1)
BASOPHILS NFR BLD AUTO: 0 % (ref 0–1)
BUN SERPL-MCNC: 19 MG/DL (ref 5–25)
CALCIUM SERPL-MCNC: 9.1 MG/DL (ref 8.4–10.2)
CHLORIDE SERPL-SCNC: 104 MMOL/L (ref 96–108)
CO2 SERPL-SCNC: 30 MMOL/L (ref 21–32)
CREAT SERPL-MCNC: 0.94 MG/DL (ref 0.6–1.3)
EOSINOPHIL # BLD AUTO: 0.13 THOUSAND/ÂΜL (ref 0–0.61)
EOSINOPHIL NFR BLD AUTO: 1 % (ref 0–6)
ERYTHROCYTE [DISTWIDTH] IN BLOOD BY AUTOMATED COUNT: 12.5 % (ref 11.6–15.1)
GFR SERPL CREATININE-BSD FRML MDRD: 78 ML/MIN/1.73SQ M
GLUCOSE SERPL-MCNC: 108 MG/DL (ref 65–140)
HCT VFR BLD AUTO: 40.2 % (ref 36.5–49.3)
HGB BLD-MCNC: 13.3 G/DL (ref 12–17)
HOLD SPECIMEN: NORMAL
IMM GRANULOCYTES # BLD AUTO: 0.03 THOUSAND/UL (ref 0–0.2)
IMM GRANULOCYTES NFR BLD AUTO: 0 % (ref 0–2)
INR PPP: 1.15 (ref 0.84–1.19)
LYMPHOCYTES # BLD AUTO: 1.54 THOUSANDS/ÂΜL (ref 0.6–4.47)
LYMPHOCYTES NFR BLD AUTO: 16 % (ref 14–44)
MCH RBC QN AUTO: 32.4 PG (ref 26.8–34.3)
MCHC RBC AUTO-ENTMCNC: 33.1 G/DL (ref 31.4–37.4)
MCV RBC AUTO: 98 FL (ref 82–98)
MONOCYTES # BLD AUTO: 0.89 THOUSAND/ÂΜL (ref 0.17–1.22)
MONOCYTES NFR BLD AUTO: 9 % (ref 4–12)
NEUTROPHILS # BLD AUTO: 7.11 THOUSANDS/ÂΜL (ref 1.85–7.62)
NEUTS SEG NFR BLD AUTO: 74 % (ref 43–75)
NRBC BLD AUTO-RTO: 0 /100 WBCS
PLATELET # BLD AUTO: 193 THOUSANDS/UL (ref 149–390)
PMV BLD AUTO: 10.9 FL (ref 8.9–12.7)
POTASSIUM SERPL-SCNC: 4.1 MMOL/L (ref 3.5–5.3)
PROTHROMBIN TIME: 14.6 SECONDS (ref 11.6–14.5)
RBC # BLD AUTO: 4.11 MILLION/UL (ref 3.88–5.62)
SODIUM SERPL-SCNC: 141 MMOL/L (ref 135–147)
WBC # BLD AUTO: 9.73 THOUSAND/UL (ref 4.31–10.16)

## 2024-07-01 PROCEDURE — 85025 COMPLETE CBC W/AUTO DIFF WBC: CPT | Performed by: EMERGENCY MEDICINE

## 2024-07-01 PROCEDURE — 99285 EMERGENCY DEPT VISIT HI MDM: CPT | Performed by: EMERGENCY MEDICINE

## 2024-07-01 PROCEDURE — 70450 CT HEAD/BRAIN W/O DYE: CPT

## 2024-07-01 PROCEDURE — 99284 EMERGENCY DEPT VISIT MOD MDM: CPT

## 2024-07-01 PROCEDURE — 85730 THROMBOPLASTIN TIME PARTIAL: CPT | Performed by: EMERGENCY MEDICINE

## 2024-07-01 PROCEDURE — 12011 RPR F/E/E/N/L/M 2.5 CM/<: CPT | Performed by: EMERGENCY MEDICINE

## 2024-07-01 PROCEDURE — 85610 PROTHROMBIN TIME: CPT | Performed by: EMERGENCY MEDICINE

## 2024-07-01 PROCEDURE — 80048 BASIC METABOLIC PNL TOTAL CA: CPT | Performed by: EMERGENCY MEDICINE

## 2024-07-01 PROCEDURE — 36415 COLL VENOUS BLD VENIPUNCTURE: CPT | Performed by: EMERGENCY MEDICINE

## 2024-07-01 RX ORDER — LEVETIRACETAM 500 MG/5ML
500 INJECTION, SOLUTION, CONCENTRATE INTRAVENOUS 2 TIMES DAILY
Status: DISCONTINUED | OUTPATIENT
Start: 2024-07-01 | End: 2024-07-02 | Stop reason: HOSPADM

## 2024-07-01 RX ORDER — LIDOCAINE HYDROCHLORIDE AND EPINEPHRINE 10; 10 MG/ML; UG/ML
5 INJECTION, SOLUTION INFILTRATION; PERINEURAL ONCE
Status: COMPLETED | OUTPATIENT
Start: 2024-07-01 | End: 2024-07-01

## 2024-07-01 RX ORDER — ONDANSETRON 2 MG/ML
4 INJECTION INTRAMUSCULAR; INTRAVENOUS EVERY 6 HOURS PRN
Status: DISCONTINUED | OUTPATIENT
Start: 2024-07-01 | End: 2024-07-02

## 2024-07-01 RX ORDER — OXYCODONE HYDROCHLORIDE 5 MG/1
5 TABLET ORAL EVERY 4 HOURS PRN
Status: DISCONTINUED | OUTPATIENT
Start: 2024-07-01 | End: 2024-07-02

## 2024-07-01 RX ORDER — ACETAMINOPHEN 325 MG/1
650 TABLET ORAL EVERY 4 HOURS PRN
Status: DISCONTINUED | OUTPATIENT
Start: 2024-07-01 | End: 2024-07-02 | Stop reason: HOSPADM

## 2024-07-01 RX ORDER — ACETAMINOPHEN 325 MG/1
975 TABLET ORAL EVERY 8 HOURS SCHEDULED
Status: DISCONTINUED | OUTPATIENT
Start: 2024-07-01 | End: 2024-07-02 | Stop reason: HOSPADM

## 2024-07-01 RX ORDER — POLYETHYLENE GLYCOL 3350 17 G/17G
17 POWDER, FOR SOLUTION ORAL DAILY
Status: DISCONTINUED | OUTPATIENT
Start: 2024-07-02 | End: 2024-07-02

## 2024-07-01 RX ORDER — HYDROMORPHONE HCL IN WATER/PF 6 MG/30 ML
0.2 PATIENT CONTROLLED ANALGESIA SYRINGE INTRAVENOUS EVERY 2 HOUR PRN
Status: DISCONTINUED | OUTPATIENT
Start: 2024-07-01 | End: 2024-07-02

## 2024-07-01 RX ADMIN — LEVETIRACETAM 500 MG: 100 INJECTION, SOLUTION INTRAVENOUS at 22:15

## 2024-07-01 RX ADMIN — ACETAMINOPHEN 975 MG: 325 TABLET, FILM COATED ORAL at 22:15

## 2024-07-01 RX ADMIN — LIDOCAINE HYDROCHLORIDE,EPINEPHRINE BITARTRATE 5 ML: 10; .01 INJECTION, SOLUTION INFILTRATION; PERINEURAL at 19:54

## 2024-07-02 ENCOUNTER — APPOINTMENT (INPATIENT)
Dept: RADIOLOGY | Facility: HOSPITAL | Age: 74
DRG: 086 | End: 2024-07-02
Payer: MEDICARE

## 2024-07-02 ENCOUNTER — TELEPHONE (OUTPATIENT)
Dept: NEUROSURGERY | Facility: CLINIC | Age: 74
End: 2024-07-02

## 2024-07-02 VITALS
SYSTOLIC BLOOD PRESSURE: 127 MMHG | HEART RATE: 66 BPM | OXYGEN SATURATION: 97 % | RESPIRATION RATE: 20 BRPM | DIASTOLIC BLOOD PRESSURE: 78 MMHG | TEMPERATURE: 98.1 F
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
## 2024-07-02 PROBLEM — I62.00 SUBDURAL HEMORRHAGE (HCC): Status: ACTIVE | Noted: 2024-07-02

## 2024-07-02 LAB
ABO GROUP BLD: NORMAL
ABO GROUP BLD: NORMAL
BLD GP AB SCN SERPL QL: NEGATIVE
RH BLD: POSITIVE
RH BLD: POSITIVE
SPECIMEN EXPIRATION DATE: NORMAL

## 2024-07-02 PROCEDURE — 99232 SBSQ HOSP IP/OBS MODERATE 35: CPT | Performed by: EMERGENCY MEDICINE

## 2024-07-02 PROCEDURE — 99223 1ST HOSP IP/OBS HIGH 75: CPT | Performed by: PHYSICIAN ASSISTANT

## 2024-07-02 PROCEDURE — 86901 BLOOD TYPING SEROLOGIC RH(D): CPT

## 2024-07-02 PROCEDURE — 86900 BLOOD TYPING SEROLOGIC ABO: CPT

## 2024-07-02 PROCEDURE — 86850 RBC ANTIBODY SCREEN: CPT

## 2024-07-02 PROCEDURE — 99284 EMERGENCY DEPT VISIT MOD MDM: CPT | Performed by: INTERNAL MEDICINE

## 2024-07-02 PROCEDURE — 70450 CT HEAD/BRAIN W/O DYE: CPT

## 2024-07-02 PROCEDURE — 36415 COLL VENOUS BLD VENIPUNCTURE: CPT

## 2024-07-02 RX ORDER — QUETIAPINE FUMARATE 50 MG/1
50 TABLET, FILM COATED ORAL 2 TIMES DAILY
COMMUNITY

## 2024-07-02 RX ORDER — SENNOSIDES 8.6 MG
650 CAPSULE ORAL 4 TIMES DAILY
COMMUNITY

## 2024-07-02 RX ORDER — LEVOTHYROXINE SODIUM 0.12 MG/1
125 TABLET ORAL DAILY
COMMUNITY

## 2024-07-02 RX ORDER — SENNA AND DOCUSATE SODIUM 50; 8.6 MG/1; MG/1
1 TABLET, FILM COATED ORAL DAILY
COMMUNITY

## 2024-07-02 RX ORDER — LORAZEPAM 0.5 MG/1
0.5 TABLET ORAL EVERY 8 HOURS
Status: DISCONTINUED | OUTPATIENT
Start: 2024-07-02 | End: 2024-07-02 | Stop reason: HOSPADM

## 2024-07-02 RX ORDER — QUETIAPINE FUMARATE 100 MG/1
100 TABLET, FILM COATED ORAL 2 TIMES DAILY
COMMUNITY

## 2024-07-02 RX ORDER — LEVOTHYROXINE SODIUM 0.07 MG/1
150 TABLET ORAL
Status: DISCONTINUED | OUTPATIENT
Start: 2024-07-02 | End: 2024-07-02

## 2024-07-02 RX ORDER — LEVETIRACETAM 500 MG/1
500 TABLET ORAL EVERY 12 HOURS SCHEDULED
Start: 2024-07-02 | End: 2024-07-08

## 2024-07-02 RX ORDER — QUETIAPINE FUMARATE 100 MG/1
100 TABLET, FILM COATED ORAL 2 TIMES DAILY
Status: DISCONTINUED | OUTPATIENT
Start: 2024-07-02 | End: 2024-07-02 | Stop reason: SDUPTHER

## 2024-07-02 RX ORDER — QUETIAPINE FUMARATE 25 MG/1
50 TABLET, FILM COATED ORAL EVERY 12 HOURS SCHEDULED
Status: DISCONTINUED | OUTPATIENT
Start: 2024-07-02 | End: 2024-07-02

## 2024-07-02 RX ORDER — QUETIAPINE FUMARATE 25 MG/1
50 TABLET, FILM COATED ORAL 2 TIMES DAILY
Status: DISCONTINUED | OUTPATIENT
Start: 2024-07-02 | End: 2024-07-02 | Stop reason: SDUPTHER

## 2024-07-02 RX ORDER — LORAZEPAM 0.5 MG/1
0.5 TABLET ORAL EVERY 8 HOURS
COMMUNITY

## 2024-07-02 RX ORDER — PRAVASTATIN SODIUM 80 MG/1
80 TABLET ORAL
Status: DISCONTINUED | OUTPATIENT
Start: 2024-07-02 | End: 2024-07-02

## 2024-07-02 RX ORDER — DIVALPROEX SODIUM 500 MG/1
500 TABLET, EXTENDED RELEASE ORAL
Status: DISCONTINUED | OUTPATIENT
Start: 2024-07-02 | End: 2024-07-02

## 2024-07-02 RX ORDER — PRAVASTATIN SODIUM 40 MG
40 TABLET ORAL DAILY
COMMUNITY

## 2024-07-02 RX ORDER — LANOLIN ALCOHOL/MO/W.PET/CERES
3 CREAM (GRAM) TOPICAL
Status: DISCONTINUED | OUTPATIENT
Start: 2024-07-02 | End: 2024-07-02

## 2024-07-02 RX ADMIN — SERTRALINE HYDROCHLORIDE 50 MG: 50 TABLET ORAL at 12:00

## 2024-07-02 RX ADMIN — LEVOTHYROXINE SODIUM 150 MCG: 75 TABLET ORAL at 07:39

## 2024-07-02 RX ADMIN — LEVETIRACETAM 500 MG: 100 INJECTION, SOLUTION INTRAVENOUS at 08:38

## 2024-07-02 RX ADMIN — LORAZEPAM 0.5 MG: 0.5 TABLET ORAL at 11:29

## 2024-07-02 RX ADMIN — QUETIAPINE FUMARATE 50 MG: 25 TABLET ORAL at 08:38

## 2024-07-02 NOTE — DISCHARGE INSTR - AVS FIRST PAGE
Neurosurgery discharge instructions following traumatic head bleed:     Do not take any blood thinning medications (ie. No Advil. No motrin. No ibuprofen. No Aleve. No Aspirin. No fishoil. No heparin. No antiplatelet / no anticoagulation medication).  Refrain from activity that increases chance of trauma to head or falls. Recommend you take fall precaution.  No strenuous activity or sports.  Return to hospital Emergency Room if you experience worsening / new headache, nausea/vomiting, speech/vision change, seizure, confusion / mental status change, weakness, or other neurological changes.      Follow-up as scheduled with a repeat CT head without contrast to be completed 2-3 days prior to visit.  Prescription has been entered electronically.  Please call [phone] to schedule.    Traumatic Laceration and Wound Care Instructions:     Wound Care:  - Wash laceration/wound daily, gently in the shower, do not scrub. Pat dry with clean towel. Do NOT immerse completely in water (i.e. tub or swimming pool) until cleared by trauma.  - Follow-up with the Trauma Service as directed for suture/staple removal.  - Call office if you develop fever/chills, redness/swelling/drainage from the site.    Additional Instructions:  - If you have any questions or concerns after discharge please call the office.  - Right eyebrow laceration sutures need to be removed in 1 week, patient can follow-up in the trauma clinic or with his PCP for suture removal.  - Call office or return to ER if fever greater than 101, chills, increasing redness/swelling at site of laceration/wound, purulent or foul smelling drainage from laceration/wound, and/or worsening/uncontrollable pain.     Trauma Discharge Instructions:    Please follow-up as instructed. If you need a follow-up appointment, please call the office when you leave to schedule an appointment.    Activity:  - PT and OT evaluation and treatment as indicated.  - You may resume activity as  tolerated.  - Walking and normal light activities are encouraged.  - Normal daily activities including climbing steps are okay.  - No driving until no longer using pain medications.    Return to work:    - You may return to work once cleared by the Trauma Service.    Diet:    - You may resume your normal diet.    Medications:  - You should continue your current medication regimen after discharge unless otherwise instructed. Please refer to your discharge medication list for further details.  - Please take the pain medications as directed.  - You are encouraged to use non-narcotic pain medications first and whenever possible. Reserve the use of narcotic pain medication for moderate to severe pain not controlled by non-narcotic medications.  - No driving while taking narcotic pain medications.  - You may become constipated, especially if taking pain medications. You may take any over the counter stool softeners or laxatives as needed. Examples: Milk of Magnesia, Colace, Senna.    Additional Instructions:  - May shower daily.  - If you have any questions or concerns after discharge please call the office.  - Call office or return to ER if fever greater than 101, chills, persistent nausea/vomiting, worsening/uncontrollable pain, develop productive cough, increasing shortness of breath, difficulty breathing, and/or increasing redness or purulent/foul smelling drainage from incision(s).   -Please continue Keppra for 6 days after discharge

## 2024-07-02 NOTE — CONSULTS
[facility]  Consult  Name: Jaspal Brownlee 73 y.o. male I MRN: [id number]  Unit/Bed#: ED 01 I Date of Admission: 7/1/2024   Date of Service: 7/2/2024 I Hospital Day: 1    Inpatient consult to Neurosurgery  Consult performed by: Tiara Goodwin PA-C  Consult ordered by: Gio Elias,       Imaging personally reviewed with attending 7/2/24 at 7:30am  Patient examined at bedside 7/2/24 at 10:45am    Assessment & Plan   Subdural hemorrhage (HCC)  Assessment & Plan  R middle cranial fossa SDH s/p fall out of wheelchair at nursing facility  + head strike, no LOC  No AC/AP medications at baseline  + dementia, oriented to self only at baseline  Currently exam at baseline. WHITFIELD, follows commands intermittently.     Imaging:   CT head w/o, 7/2/24: New small acute parenchymal hematomas near gray-white matter junction of right anterior frontal lobe, likely evolving traumatic brain contusions. Trace residual subdural hematoma in right middle cranial fossa, improved from yesterday's exam  CT head /wo, 7/1/24: Shallow subdural 4 mm in depth along the anterior aspect of the middle cranial fossa on the right, anterior to the temporal tip. No mass effect.     Plan:  Continue frequent neurological checks.   STAT CT head with any neurological decline including drop GCS of 2pts within 1 hr.  SBP per primary team recommendations  Keppra recommendations per primary team  Hold all therapeutic antiplatelet and anticoagulation medications.   Pain control per primary team  Mobilize with PT/OT  DVT PPX: SCDs only at this time. Would recommend additional CT head for stability prior to initiation of pharmacological DVT PPX.   Ongoing medical management per primary team/trauma.   No neurosurgical intervention indicated at this juncture.     Neurosurgery will sign off. Ok to discharge today given small size of new hemorrhagic contusion and lack of home AC/AP medications. However; if remaining inpatient  would repeat CT head in am for stability and to clear for DVT ppx: Please call with questions or concerns.            History of Present Illness     HPI: Jaspal Brownlee is a 73 y.o. year old male with PMH including HTN, HLD, thyroid disease, dementia who presents after a fall forward from his wheelchair. He is wheelchair bound at baseline. He hit his head but there are no reports of LOC. He does not take AC/AP medications at home. Imaging significant for a small right middle cranial fossa SDH which improved with repeat imaging. However, repeat CT head did show a new small hemorrhage contusion in the right frontal lobe without mass effect.       Review of Systems   Unable to perform ROS: Dementia       Historical Information   Past Medical History:   Diagnosis Date    Disease of thyroid gland     Hyperlipidemia     Hypertension      Past Surgical History:   Procedure Laterality Date    HERNIA REPAIR      REPLACEMENT TOTAL KNEE BILATERAL       Social History     Substance and Sexual Activity   Alcohol Use Not Currently    Comment: no drinks in at least 6 months     Social History     Substance and Sexual Activity   Drug Use Never     Social History     Tobacco Use   Smoking Status Former    Current packs/day: 0.00    Types: Cigarettes    Quit date: 10/23/2009    Years since quittin.7   Smokeless Tobacco Never     Family History   Family history unknown: Yes       Meds/Allergies   all current active meds have been reviewed, current meds:   Current Facility-Administered Medications   Medication Dose Route Frequency    acetaminophen (TYLENOL) tablet 650 mg  650 mg Oral Q4H PRN    acetaminophen (TYLENOL) tablet 975 mg  975 mg Oral Q8H YARI    levETIRAcetam (KEPPRA) injection 500 mg  500 mg Intravenous BID    LORazepam (ATIVAN) tablet 0.5 mg  0.5 mg Oral Q8H    QUEtiapine (SEROquel) tablet 150 mg  150 mg Oral BID    sertraline (ZOLOFT) tablet 50 mg  50 mg Oral Daily   , and PTA meds:   Prior to Admission Medications    Prescriptions Last Dose Informant Patient Reported? Taking?   LORazepam (ATIVAN) 0.5 mg tablet   Yes Yes   Sig: Take 0.5 mg by mouth every 8 (eight) hours   QUEtiapine (SEROquel) 100 mg tablet   Yes Yes   Sig: Take 100 mg by mouth 2 (two) times a day Takes with 50mg tabs to total 150mg BID   QUEtiapine (SEROquel) 50 mg tablet   Yes Yes   Sig: Take 50 mg by mouth 2 (two) times a day TAKES WITH 100mg tab to total 150mg BID   acetaminophen (TYLENOL) 650 mg CR tablet   Yes Yes   Sig: Take 650 mg by mouth 4 (four) times a day   cyanocobalamin (VITAMIN B-12) 1000 MCG tablet   No No   Sig: Take 1 tablet (1,000 mcg total) by mouth daily Do not start before June 30, 2023.   levothyroxine 125 mcg tablet   Yes Yes   Sig: Take 125 mcg by mouth daily   pravastatin (PRAVACHOL) 40 mg tablet   Yes Yes   Sig: Take 40 mg by mouth daily   senna-docusate sodium (SENOKOT-S) 8.6-50 mg per tablet   Yes Yes   Sig: Take 1 tablet by mouth daily   sertraline (ZOLOFT) 50 mg tablet   Yes Yes   Sig: Take 50 mg by mouth daily      Facility-Administered Medications: None     No Known Allergies    Objective   I/O         06/30 0701  07/01 0700 07/01 0701 07/02 0700 07/02 0701  07/03 0700    Urine  750     Total Output  750     Net  -750                    Physical Exam  Vitals and nursing note reviewed.   Constitutional:       Appearance: Normal appearance. He is well-developed and normal weight.   HENT:      Head: Normocephalic.      Comments: R eyebrow laceration, repaired with sutures  Eyes:      Extraocular Movements: Extraocular movements intact.      Pupils: Pupils are equal, round, and reactive to light.   Cardiovascular:      Rate and Rhythm: Normal rate.   Pulmonary:      Effort: Pulmonary effort is normal. No respiratory distress.   Abdominal:      Palpations: Abdomen is soft.   Musculoskeletal:         General: Normal range of motion.      Cervical back: Normal range of motion.   Skin:     General: Skin is warm and dry.  "  Neurological:      Mental Status: He is alert. Mental status is at baseline.      Cranial Nerves: Cranial nerves 2-12 are intact.      Motor: Motor strength is normal.  Psychiatric:         Speech: Speech normal.       Neurologic Exam     Mental Status   Oriented to person.   Disoriented to place.   Disoriented to time.   Follows 1 step commands.   Attention: decreased. Concentration: decreased.   Speech: speech is normal   Level of consciousness: alert  Knowledge: good.   Able to repeat. Normal comprehension.     Cranial Nerves   Cranial nerves II through XII intact.     CN III, IV, VI   Pupils are equal, round, and reactive to light.    Motor Exam   Muscle bulk: normal  Overall muscle tone: normal    Strength   Strength 5/5 throughout.     Sensory Exam   Light touch normal.     Gait, Coordination, and Reflexes     Tremor   Resting tremor: absent    Reflexes   Right Kumari: absent  Left Kumari: absent  Right ankle clonus: absent  Left ankle clonus: absent      Vitals:Blood pressure 130/81, pulse 63, temperature 98.1 °F (36.7 °C), temperature source Oral, resp. rate 18, SpO2 97%.,There is no height or weight on file to calculate BMI.     Lab Results:   Results from last 7 days   Lab Units 07/01/24 1953   WBC Thousand/uL 9.73   HEMOGLOBIN g/dL 13.3   HEMATOCRIT % 40.2   PLATELETS Thousands/uL 193   SEGS PCT % 74   MONO PCT % 9   EOS PCT % 1     Results from last 7 days   Lab Units 07/01/24 1953   POTASSIUM mmol/L 4.1   CHLORIDE mmol/L 104   CO2 mmol/L 30   BUN mg/dL 19   CREATININE mg/dL 0.94   CALCIUM mg/dL 9.1             Results from last 7 days   Lab Units 07/01/24 1953   INR  1.15   PTT seconds 31     No results found for: \"TROPONINT\"  ABG:No results found for: \"PHART\", \"WEH4HAO\", \"PO2ART\", \"FTR4YZE\", \"K0QMQLIE\", \"BEART\", \"SOURCE\"    Imaging Studies: I have personally reviewed pertinent reports.   and I have personally reviewed pertinent films in PACS    CT head wo contrast    Result Date: " 7/2/2024  Narrative: CT BRAIN - WITHOUT CONTRAST INDICATION:   SDH. COMPARISON: CT head without contrast 7/1/2024, 10/8/2023.. TECHNIQUE:  CT examination of the brain was performed.  Multiplanar 2D reformatted images were created from the source data. Radiation dose length product (DLP) for this visit:  984.71 mGy-cm .  This examination, like all CT scans performed in the [facility], was performed utilizing techniques to minimize radiation dose exposure, including the use of iterative  reconstruction and automated exposure control. IMAGE QUALITY:  Diagnostic. FINDINGS: PARENCHYMA AND EXTRA-AXIAL SPACES: New small acute parenchymal hematomas near gray-white matter junction of right anterior frontal lobe (2:39, 35), likely evolving traumatic brain contusions. Trace residual subdural hematoma in right middle cranial fossa, improved from yesterday's exam. Decreased attenuation is noted in periventricular and subcortical white matter demonstrating an appearance that is statistically most likely to represent severe microangiopathic change. No CT signs of acute infarction.  No intracranial mass, mass effect or midline shift. Arterial calcifications of carotid siphons and left intradural vertebral artery. VENTRICLES:  Ventricles and extra-axial CSF spaces are prominent commensurate with the degree of volume loss.  No hydrocephalus. No acute intraventricular hemorrhage. VISUALIZED ORBITS: Normal visualized orbits. PARANASAL SINUSES: Normal visualized paranasal sinuses. CALVARIUM AND EXTRACRANIAL SOFT TISSUES: No acute calvarial fracture.     Impression: New small acute parenchymal hematomas near gray-white matter junction of right anterior frontal lobe, likely evolving traumatic brain contusions. Trace residual subdural hematoma in right middle cranial fossa, improved from yesterday's exam. The study was marked in EPIC for immediate notification. Workstation performed: [id number]     CT head without  contrast    Result Date: 7/1/2024  Narrative: CT BRAIN - WITHOUT CONTRAST INDICATION:   fall, headstrike. COMPARISON: October 8, 2023 TECHNIQUE:  CT examination of the brain was performed.  Multiplanar 2D reformatted images were created from the source data. Radiation dose length product (DLP) for this visit:  905.9 mGy-cm .  This examination, like all CT scans performed in the [facility], was performed utilizing techniques to minimize radiation dose exposure, including the use of iterative reconstruction and automated exposure control. IMAGE QUALITY:  Diagnostic. FINDINGS: PARENCHYMA: Decreased attenuation is noted in periventricular and subcortical white matter demonstrating an appearance that is statistically most likely to represent moderate-advanced microangiopathic change. No CT signs of acute infarction.  No intracranial mass, mass effect or midline shift.  No acute parenchymal hemorrhage. VENTRICLES AND EXTRA-AXIAL SPACES: Ventricles are average for patient's age and are symmetric. No hydrocephalus. Shallow subdural hemorrhage in the right middle cranial fossa anterior to the right temporal tip measuring up to 4 mm in depth. No mass effect. VISUALIZED ORBITS: Normal visualized orbits. PARANASAL SINUSES: Normal visualized paranasal sinuses. CALVARIUM AND EXTRACRANIAL SOFT TISSUES: Soft tissue edema lateral right forehead and overlying the anterior right zygoma. No underlying calvarial fracture.     Impression: Shallow subdural 4 mm in depth along the anterior aspect of the middle cranial fossa on the right, anterior to the temporal tip. No mass effect. This is new from October 2023. I personally discussed this study with TAYLOR JOSUE on 7/1/2024 7:42 PM. I personally discussed this study with TAYLOR JOSUE on 7/1/2024 7:42 PM. Workstation performed: [id number]     EKG, Pathology, and Other Studies: I have personally reviewed pertinent reports.      VTE Prophylaxis: SCDs    Code  Status: Level 3 - DNAR and DNI  Advance Directive and Living Will:      Power of :    POLST:      Counseling / Coordination of Care  I spent 30 minutes with the patient.

## 2024-07-02 NOTE — EMTALA/ACUTE CARE TRANSFER
[facility] EMERGENCY DEPARTMENT  [address] [place] [id number]  Dept: [phone]      EMTALA TRANSFER CONSENT    NAME Jaspal Brownlee                                         1950                              MRN [id number]    I have been informed of my rights regarding examination, treatment, and transfer   by Dr. Jessie Burnett MD    Benefits: Specialized equipment and/or services available at the receiving facility (Include comment)________________________    Risks: Potential for delay in receiving treatment, Potential deterioration of medical condition      Consent for Transfer:  I acknowledge that my medical condition has been evaluated and explained to me by the emergency department physician or other qualified medical person and/or my attending physician, who has recommended that I be transferred to the service of  Accepting Physician: Dr. Gould at Accepting Facility Name, City & State : [facility]. The above potential benefits of such transfer, the potential risks associated with such transfer, and the probable risks of not being transferred have been explained to me, and I fully understand them.  The doctor has explained that, in my case, the benefits of transfer outweigh the risks.  I agree to be transferred.    I authorize the performance of emergency medical procedures and treatments upon me in both transit and upon arrival at the receiving facility.  Additionally, I authorize the release of any and all medical records to the receiving facility and request they be transported with me, if possible.  I understand that the safest mode of transportation during a medical emergency is an ambulance and that the Hospital advocates the use of this mode of transport. Risks of traveling to the receiving facility by car, including absence of medical control, life sustaining equipment, such as oxygen, and medical personnel has been explained to me and I fully understand  them.    (TUNG CORRECT BOX BELOW)  [  ]  I consent to the stated transfer and to be transported by ambulance/helicopter.  [  ]  I consent to the stated transfer, but refuse transportation by ambulance and accept full responsibility for my transportation by car.  I understand the risks of non-ambulance transfers and I exonerate the Hospital and its staff from any deterioration in my condition that results from this refusal.    X___________________________________________    DATE  24  TIME________  Signature of patient or legally responsible individual signing on patient behalf           RELATIONSHIP TO PATIENT_________________________          Provider Certification    NAME Jaspal Brownlee                                         1950                              MRN [id number]    A medical screening exam was performed on the above named patient.  Based on the examination:    Condition Necessitating Transfer The primary encounter diagnosis was Fall, initial encounter. A diagnosis of Traumatic subdural hematoma (HCC) was also pertinent to this visit.    Patient Condition: The patient has been stabilized such that within reasonable medical probability, no material deterioration of the patient condition or the condition of the unborn child(maurisio) is likely to result from the transfer    Reason for Transfer: Level of Care needed not available at this facility    Transfer Requirements: Facility L[facility] of hospitals in the [place]   Space available and qualified personnel available for treatment as acknowledged by    Agreed to accept transfer and to provide appropriate medical treatment as acknowledged by       Dr. Gould  Appropriate medical records of the examination and treatment of the patient are provided at the time of transfer   STAFF INITIAL WHEN COMPLETED _______  Transfer will be performed by qualified personnel from    and appropriate transfer equipment as required, including the use of necessary and appropriate life support  measures.    Provider Certification: I have examined the patient and explained the following risks and benefits of being transferred/refusing transfer to the patient/family:  General risk, such as traffic hazards, adverse weather conditions, rough terrain or turbulence, possible failure of equipment (including vehicle or aircraft), or consequences of actions of persons outside the control of the transport personnel      Based on these reasonable risks and benefits to the patient and/or the unborn child(maurisio), and based upon the information available at the time of the patient’s examination, I certify that the medical benefits reasonably to be expected from the provision of appropriate medical treatments at another medical facility outweigh the increasing risks, if any, to the individual’s medical condition, and in the case of labor to the unborn child, from effecting the transfer.    X____________________________________________ DATE 07/01/24        TIME_______      ORIGINAL - SEND TO MEDICAL RECORDS   COPY - SEND WITH PATIENT DURING TRANSFER

## 2024-07-02 NOTE — ASSESSMENT & PLAN NOTE
Continue home regimen  -Seroquel  -Depakote  -Melatonin at bedtime  -Delirium precautions  -Fall precautions  -Follow-up geriatric consult

## 2024-07-02 NOTE — ASSESSMENT & PLAN NOTE
R middle cranial fossa SDH s/p fall out of wheelchair at nursing facility  + head strike, no LOC  No AC/AP medications at baseline  + dementia, oriented to self only at baseline  Currently exam at baseline. WHITFIELD, follows commands intermittently.     Imaging:   CT head w/o, 7/2/24: New small acute parenchymal hematomas near gray-white matter junction of right anterior frontal lobe, likely evolving traumatic brain contusions. Trace residual subdural hematoma in right middle cranial fossa, improved from yesterday's exam  CT head /wo, 7/1/24: Shallow subdural 4 mm in depth along the anterior aspect of the middle cranial fossa on the right, anterior to the temporal tip. No mass effect.     Plan:  Continue frequent neurological checks.   STAT CT head with any neurological decline including drop GCS of 2pts within 1 hr.  SBP per primary team recommendations  Keppra recommendations per primary team  Hold all therapeutic antiplatelet and anticoagulation medications.   Pain control per primary team  Mobilize with PT/OT  DVT PPX: SCDs only at this time. Would recommend additional CT head for stability prior to initiation of pharmacological DVT PPX.   Ongoing medical management per primary team/trauma.   No neurosurgical intervention indicated at this juncture.     Neurosurgery will sign off. Ok to discharge today given small size of new hemorrhagic contusion and lack of home AC/AP medications. However; if remaining inpatient would repeat CT head in am for stability and to clear for DVT ppx: Please call with questions or concerns.

## 2024-07-02 NOTE — H&P
H&P - Trauma   Jaspal Brownlee 73 y.o. male MRN: [id number]  Unit/Bed#: ED 01 Encounter: [id number]    Trauma Alert: Other Transfer from [facility]    Model of Arrival: Ambulance    Trauma Team: Attending Bryanna,  Resident Jada  Consultants:     Neurosurgery: routine consult; Epic consult order placed;     Assessment & Plan   Active Problems / Assessment:   Right subdural hematoma  Hypothyroidism  Hyperlipidemia  Dementia  Right eyebrow laceration       Plan:   Right subdural hematoma:  - Neuro exam: at baseline, demented  and GCS 14 (4, 4, 6), due to confusion  - Continue neurologic checks: Every 1 hour.  - CT scan of the head on 7/1/2024 reviewed: Shallow subdural 4 mm along the anterior aspect of the middle cranial fossa on the right.  No mass effect.  - Appreciate Neurosurgery evaluation and recommendations.  - Complete 7 day course of Keppra for seizure prophylaxis  - Chemical DVT prophylaxis: Not cleared for chemical prophylaxis by neurosurgery at this time. Continue SCDs bilaterally.   - Hold all anticoagulants and anti platelet medications for 2 weeks and/or until cleared by Neurosurgery to resume.  - PT and OT (including cognitive evaluation) evaluation and treatment as indicated.   -Repeat CT head tomorrow    Hypothyroidism:  -Continue home levothyroxine 150 mcg daily    Hyperlipidemia:  -Continue statin    Dementia:  -Seroquel 50 mg tablet every 12 hours  -Melatonin 3 mg daily nightly  -Geriatric consult    History of Present Illness     Chief Complaint: Head injury  Mechanism:Fall     HPI:    Jaspal Brownlee is a 73 y.o. male with past medical history dementia who presents from nursing home status post fall.  He is wheelchair-bound and unable to walk at baseline per nursing home.  Patient stood up from his wheelchair today and fell forward striking his head on the ground.  Patient does not take blood thinning medications.  Patient is unable to provide a history and is oriented x 1 at baseline.   Statement Selected Patient sustained a 2 cm right eyebrow laceration which was repaired at [facility].    Review of Systems   Respiratory:  Negative for cough and shortness of breath.    Cardiovascular:  Negative for chest pain.   Gastrointestinal:  Negative for abdominal pain and vomiting.   Skin:  Positive for wound.   Neurological:  Negative for headaches.     12-point, complete review of systems was reviewed and negative except as stated above.     Historical Information     Past Medical History:   Diagnosis Date    Disease of thyroid gland     Hyperlipidemia     Hypertension      Past Surgical History:   Procedure Laterality Date    HERNIA REPAIR      REPLACEMENT TOTAL KNEE BILATERAL          Social History     Tobacco Use    Smoking status: Former     Current packs/day: 0.00     Types: Cigarettes     Quit date: 10/23/2009     Years since quittin.7    Smokeless tobacco: Never   Vaping Use    Vaping status: Never Used   Substance Use Topics    Alcohol use: Not Currently     Comment: no drinks in at least 6 months    Drug use: Never     Immunization History   Administered Date(s) Administered    COVID-19 J&J (Quantified Skin) vaccine 0.5 mL 2021    Pneumococcal Conjugate Vaccine 20-valent (Pcv20), Polysace 2023     Last Tetanus: 2017  Family History: Non-contributory    1. Before the illness or injury that brought you to the Emergency, did you need someone to help you on a regular basis? 1=Yes   2. Since the illness or injury that brought you to the Emergency, have you needed more help than usual to take care of yourself? 1=Yes   3. Have you been hospitalized for one or more nights during the past 6 months (excluding a stay in the Emergency Department)? 0=No   4. In general, do you see well? 0=Yes   5. In general, do you have serious problems with your memory? 1=Yes   6. Do you take more than three different medications everyday? 1=Yes   TOTAL   4     Did you order a geriatric consult if the score was 2 or greater?:  yes     Meds/Allergies   all current active meds have been reviewed   No Known Allergies    Objective   Initial Vitals:   Temperature: 98.1 °F (36.7 °C) (07/01/24 2109)  Pulse: 72 (07/01/24 2112)  Respirations: 19 (07/01/24 2112)  Blood Pressure: 119/70 (07/01/24 2112)    Primary Survey:   Airway:        Status: patent;        Pre-hospital Interventions: none        Hospital Interventions: none  Breathing:        Pre-hospital Interventions: none       Effort: normal       Right breath sounds: normal       Left breath sounds: normal  Circulation:        Rhythm: regular       Rate: regular   Right Pulses Left Pulses    R radial: 2+    R pedal: 2+     L radial: 2+    L pedal: 2+       Disability:        GCS: Eye: 4; Verbal: 4 Motor: 6 Total: 14       Right Pupil: 3 mm;  round;  reactive         Left Pupil:  round;  reactive      R Motor Strength L Motor Strength    R : 5/5  R dorsiflex: 5/5  R plantarflex: 5/5 L : 5/5  L dorsiflex: 5/5  L plantarflex: 5/5        Sensory:  No sensory deficit  Exposure:       Completed: Yes      Secondary Survey:  Physical Exam  Vitals and nursing note reviewed.   Constitutional:       General: He is not in acute distress.     Appearance: Normal appearance. He is normal weight. He is not ill-appearing, toxic-appearing or diaphoretic.   HENT:      Head: Normocephalic.      Comments: 2 cm laceration repair to the R eyebrow     Right Ear: Tympanic membrane, ear canal and external ear normal.      Left Ear: Tympanic membrane, ear canal and external ear normal.      Nose: No rhinorrhea.      Mouth/Throat:      Mouth: Mucous membranes are moist.      Pharynx: Oropharynx is clear.   Eyes:      Extraocular Movements: Extraocular movements intact.      Conjunctiva/sclera: Conjunctivae normal.      Pupils: Pupils are equal, round, and reactive to light.   Cardiovascular:      Rate and Rhythm: Normal rate and regular rhythm.      Pulses: Normal pulses.      Heart sounds: Normal heart  sounds.   Pulmonary:      Effort: Pulmonary effort is normal.      Breath sounds: Normal breath sounds.   Abdominal:      General: Abdomen is flat.      Palpations: Abdomen is soft.      Tenderness: There is no abdominal tenderness.   Musculoskeletal:      Cervical back: Neck supple. No tenderness.      Right lower leg: No edema.      Left lower leg: No edema.   Skin:     General: Skin is warm and dry.      Capillary Refill: Capillary refill takes less than 2 seconds.   Neurological:      Mental Status: He is alert. Mental status is at baseline.      Cranial Nerves: No cranial nerve deficit.      Sensory: Sensation is intact.      Motor: Motor function is intact.      Comments: Oriented x1         Invasive Devices       Peripheral Intravenous Line  Duration             Peripheral IV 10/28/23 Left Antecubital 247 days    Peripheral IV 07/01/24 Left Antecubital <1 day                  Lab Results: I have personally reviewed all pertinent laboratory/test results 07/02/24 and in the preceding 24 hours.  Recent Labs     07/01/24  1953   WBC 9.73   HGB 13.3   HCT 40.2      SODIUM 141   K 4.1      CO2 30   BUN 19   CREATININE 0.94   GLUC 108   PTT 31   INR 1.15       Imaging Results: I have personally reviewed pertinent images saved in PACS. CT scan findings (and other pertinent positive findings on images) were discussed with radiology. My interpretation of the images/reports are as follows:  Chest Xray(s): N/A   FAST exam(s): N/A   CT Scan(s): positive for acute findings: CT head Shallow subdural 4 mm in depth along the anterior aspect of the middle cranial fossa on the right, anterior to the temporal tip. No mass effect.   Additional Xray(s): N/A     Other Studies: none    Code Status: Level 3 - DNAR and DNI  Advance Directive and Living Will:      Power of :    POLST:

## 2024-07-02 NOTE — CONSULTS
Consultation - Geriatric Medicine   Jaspal Brownlee 73 y.o. male MRN: [id number]  Unit/Bed#: ED 01 Encounter: [id number]      Assessment & Plan     Ambulatory dysfunction with fall  -reportedly mechanical fall from wheelchair 7/1/24  -(+) head strike (-) loss of consciousness  -injuries as outlined below  -ambulates with assist of staff or wheelchair at SNF at baseline   -hx recurrent falls   -remains high risk future falls due to age, hx of fall, impulsivity with no safety awareness, deconditioning/debility and unfamiliar environment   -encourage good body mechanics and assist with all transfers  -keep personal items and call bell close to prevent reaching  -maintain environment free of fall hazards  -encourage appropriate footwear and adequate lighting at all times when out of bed  -consider environmental fall risk assessment and personal fall alert system if returning to prior care setting   -PT and OT pending     Subdural hematoma   -s/p fall as outlined above   -CTH on admit reports SDH along ant middle cranial fossa on right   -not on any systemic AC/AP as o/p  -currently on Keppra for ppx   -Neurochecks per protocol  -repeat CTH pending  -Nsx consult pending     Forehead laceration   -s/p fall as above  -sutures in place   -continue local wound cares     Acute pain due to trauma  -recommend pain control per Geriatric pain protocol:  Tylenol 975mg Q8H scheduled  Roxicodone 2.5mg Q4H PRN moderate pain  Roxicodone 5mg Q4H PRN severe pain  Dilaudid 0.2mg Q4H PRN  -consider adjuncts such as lidocaine patch topically to appropriate areas  -encourage addition of non-pharmacologic pain treatment including ice and frequent repositioning  -recommend bowel regimen to prevent constipation due to increased risk with acute pain and opiate pain medications    Dementia with behavioral disturbance  -largely vascular type  -severe and progressive  -at baseline oriented to self and dependent for all cares now resides in memory  care  facility   -MoCA 8/30 (6/2023)  -behaviors reportedly present at baseline- no known triggers, now well controlled on home medication regimen as outlined below as well as with frequent ambulation with assist around unit at care facility for energy expenditure and distraction with snacks   -not on Depakote as o/p, recommend d/c  -Although preferable to minimize use of benzodiazepines in older population would continue home medication as outlined below as behaviors have previously been difficult to manage and are now reportedly stable on this regimen   -CTH obtained on admission imaging personally viewed, in addition to acute findings moderate diffuse chronic microangiopathic changes also noted   -at risk acceleration due to TBI/SDH, cont supportive cares and secondary risk factor modifications  -maintained on levothyroxine and B12 supplements as o/p  -Continue to encourage patient remain physically, socially, cognitively active and engaged to maintain cognitive acuity  -Encourage use of centesis devices such as corrective lenses at all appropriate times to reduce risk of uncorrected since impairment from continuing to isolation, confusion, encephalopathy and more precipitous cognitive decline    Hypothyroidism  -last TSH markedly elevated at 40.96 down from 138 previously   -maintained on levothyroxine chronically outpatient, recommend continuation of home regimen and repeat TSH for further dose titration  -Continue close outpatient follow-up with PCP for ongoing dose titration and medication management    B12 deficiency  -Last B12 elevated at 1169 (7/2023), recommend checking updated level to guide ongoing supplementation   -Supplement titration pending updated B12 levels    Impaired Vision  -recommend use of corrective lenses at all appropriate times  -encourage adequate lighting and encourage use of assistance with ambulation  -keep personal belongings close to person to avoid reaching  -encourage appropriate  footwear at all times  -Consider large font for printed materials provided to patient    Deconditioning/debility/frailty  -clinical frailty scale stage VI, moderately frail, progressive   -multifactorial including age, dementia, hypothyroidism and multiple additional chronic medical co-morbidities   -cont optimization of chronic medical conditions and address acute metabolic derangements as arise  -monitor for and treat any underlying anxiety, mood depression symptoms as may impact patient response to treatments and interventions   -encourage well balanced nutritional intake  -continue to ensure treatments and interventions align with patients wishes and goals of care   -continue psychosocial supports     Delirium precautions  -Patient is high risk of delirium due to age, fall, traumatic  injuries, acute pain, dementia   -Initiate delirium precautions  -maintain normal sleep/wake cycle  -minimize overnight interruptions, group overnight vitals/labs/nursing checks as possible  -dim lights, close blinds and turn off tv to minimize stimulation and encourage sleep environment in evenings  -ensure that pain is well controlled   -monitor for fecal and urinary retention which may precipitate delirium  -encourage early mobilization and ambulation with assist with tx as cleared to safely do so  -provide frequent reorientation and redirection as indicated and appropriate   -encourage family and friends at the bedside to help help calm patient if anxious  -encourage hydration and nutrition   -redirect unwanted behaviors as first line    Home medication review   Personally confirmed with medication list obtained from [facility] [place]:    Acetaminophen 650 mg 4 times daily  Levothyroxine 125 mcg daily  Lorazepam 0.5 Mg every 8 hours  Pravastatin 40 Mg daily  Senna-docusate sodium 8.6-50 Mg daily  Seroquel 150 Mg twice daily  Sertraline 50 Mg daily  B12 1000 mcg daily    Diet - regular/mech soft thin with double  portions and food in bowl for all meals     Previously on amlodipine no longer active as of 5/18/24    Care coordination: rounded with [place] (RN) in ED     History of Present Illness   Physician Requesting Consult: Alfonso Gould MD  Reason for Consult / Principal Problem: Fall  Hx and PE limited by: dementia   Additional history obtained from: Chart review and patient evaluation, personally spoke with patients care staff,  [place], at [facility] where patient resides     HPI: Jaspal Brownlee is a 73 y.o. year old male with dementia with behavioral disturbance, hypertension, hyperlipidemia, hypothyroidism, TORI on CPAP, vertigo, and ambulatory dysfunction who is admitted to the Trauma service with fall found to have SDH on admission imaging, he is being seen in consultation by Geriatrics for high risk developing delirium during hospitalization. Jaspal is seen and examined at bedside in the ED where he is lying resting, he is confused and unable to provide any reliable history and thus it is obtained from chart review, discussion with care teams and personally discussing with his care staff at his long term care facility. Per care facility Jaspal sustained a mechanical fall from his wheelchair when he attempted to get up without assistance. The fall was reportedly witnessed with headstrike and no loss of consciousness. He initially presented to the [facility] ED where he was found to have right forehead laceration and SDH and was transferred to [facility] for higher level of care and admission to Trauma. This morning he remains confused which is his baseline, per care staff he has advanced dementia with spontaneous behavorial disturbances with no known triggers. Behaviors are managed pharmacologically as well as with distraction with frequent ambulation with hand hold assist and snacks. He does not use assist device for ambulation and is typically assisted by staff when mobilizing as he is very unsteady on his  feet and otherwise utilizes a wheelchair. He is impulsive and often attempts to walk without assist. He is dependent for all cares and requires use of glasses which he does not often wear. He is oriented to self at baseline otherwise confused.     Inpatient consult to Gerontology  Consult performed by: Carrie Sanchez DO  Consult ordered by: Gio Elias DO        Review of Systems   Unable to perform ROS: Dementia     Historical Information   Past Medical History:   Diagnosis Date    Disease of thyroid gland     Hyperlipidemia     Hypertension      Past Surgical History:   Procedure Laterality Date    HERNIA REPAIR      REPLACEMENT TOTAL KNEE BILATERAL       Social History   Social History     Substance and Sexual Activity   Alcohol Use Not Currently    Comment: no drinks in at least 6 months     Social History     Substance and Sexual Activity   Drug Use Never     Social History     Tobacco Use   Smoking Status Former    Current packs/day: 0.00    Types: Cigarettes    Quit date: 10/23/2009    Years since quittin.7   Smokeless Tobacco Never     Family History:   Family History   Family history unknown: Yes - and patient unable to provide due to advanced dementia, no family available to assist with additional history      Meds/Allergies   all current active meds have been reviewed    No Known Allergies    Objective     Intake/Output Summary (Last 24 hours) at 2024 0748  Last data filed at 2024 0601  Gross per 24 hour   Intake --   Output 750 ml   Net -750 ml     Invasive Devices       Peripheral Intravenous Line  Duration             Peripheral IV 10/28/23 Left Antecubital 247 days    Peripheral IV 24 Left Antecubital <1 day                  Physical Exam  Vitals and nursing note reviewed.   Constitutional:       General: He is not in acute distress.     Appearance: He is not toxic-appearing.   HENT:      Head: Normocephalic.      Comments: Right forehead laceration sutures in place      Nose:  Nose normal.      Mouth/Throat:      Mouth: Mucous membranes are dry.   Eyes:      General: No scleral icterus.        Right eye: No discharge.         Left eye: No discharge.      Conjunctiva/sclera: Conjunctivae normal.   Neck:      Comments: Trachea midline   Cardiovascular:      Rate and Rhythm: Normal rate and regular rhythm.      Pulses: Normal pulses.   Pulmonary:      Effort: Pulmonary effort is normal. No respiratory distress.      Breath sounds: No wheezing.      Comments: Saturating well on room air   Abdominal:      General: There is no distension.      Palpations: Abdomen is soft.      Tenderness: There is no abdominal tenderness.   Musculoskeletal:      Cervical back: Neck supple.      Right lower leg: No edema.      Left lower leg: No edema.      Comments: Reduced overall muscle mass    Skin:     General: Skin is warm and dry.   Neurological:      Mental Status: He is alert.      Comments: Awake and alert, confused and does not answer questions appropriately    Psychiatric:      Comments: Inattentive and impulsive with no safety awareness        Lab Results:     I have personally reviewed pertinent lab results including the followin/1/24- CBC, BMP, PT/INR, PTT    I have personally reviewed the following imaging study reports in PACS:    10/8/23- CTH  24- CTH    Therapies:   PT: pending   OT: pending     VTE Prophylaxis: Sequential compression device (Venodyne)     Code Status: Level 3 - DNAR and DNI  Advance Directive and Living Will:      Power of :    POLST:      Family and Social Support: SNF staff     Goals of Care: recovery from acute injuries

## 2024-07-02 NOTE — PROGRESS NOTES
[facility]  Progress Note  Name: Jaspal Brownlee I  MRN: [id number]  Unit/Bed#: ED 01 I Date of Admission: 7/1/2024   Date of Service: 7/2/2024 I Hospital Day: 1    Assessment & Plan   * Fall  Assessment & Plan  Patient presenting as a fall from his dementia facility, initial workup positive for right eyebrow laceration and SDH  -See SDH plan  -Right eyebrow laceration repaired in ED   -Outpatient follow-up for suture removal in the trauma clinic  -Follow-up geriatric medicine consultation  -PT/OT  -Pain control as needed  -Bowel regimen  -Fall Precautions  -Disposition planning    Subdural hemorrhage (HCC)  Assessment & Plan  7/1 CT head: 4 mm in depth along the anterior aspect of the middle cranial fossa on the right, anterior to the temporal tip.  No midline shift  7/2 repeat CT head: new small acute parenchymal hematomas near gray-white matter junction of right anterior frontal lobe, likely evolving traumatic brain contusions.  Improving SDH  -7/2 GCS 14(motor 6, eyes 4, verbal 4), oriented to person only  -HOT protocol  -Keppra 500 mg twice daily  -Every hour neurovascular checks  -Follow-up neurosurgery evaluation    Dementia in other diseases classified elsewhere, unspecified severity, with mood disturbance (HCC)  Assessment & Plan  Continue home regimen  -Seroquel  -Depakote  -Melatonin at bedtime  -Delirium precautions  -Fall precautions  -Follow-up geriatric consult    HLD (hyperlipidemia)  Assessment & Plan  Continue home regimen  -Pravastatin 80 Mg daily    Hypothyroidism  Assessment & Plan  -Continue home levothyroxine 150 mcg daily             Bowel Regimen: MiraLAX  VTE Prophylaxis:Reason for no pharmacologic prophylaxis held secondary to SDH      Disposition: Pending clinical improvement, likely will return to facility    Subjective     Subjective: Patient seen and examined at bedside.  Patient oriented to person only.  Patient expressed no acute concerns.   Patient denies nausea vomiting fever chills shortness of breath.     Objective   Vitals:   Temp:  [98.1 °F (36.7 °C)-98.8 °F (37.1 °C)] 98.1 °F (36.7 °C)  HR:  [52-72] 63  Resp:  [13-21] 18  BP: (110-161)/(61-86) 130/81    I/O         06/30 0701 07/01 0700 07/01 0701 07/02 0700 07/02 0701 07/03 0700    Urine  750     Total Output  750     Net  -750                     Physical Exam:   GENERAL APPEARANCE: Resting comfortably, no acute distress  NEURO: Oriented to person only.  Motor and sensory intact in the upper and lower extremities bilaterally  HEENT: Moist mucous membranes   CV: well-perfused regular rate  LUNGS: Normal work of breathing on room air  GI: Soft, nontender, nondistended  MSK: No lower extremity edema bilaterally  SKIN: Warm and dry    Invasive Devices       Peripheral Intravenous Line  Duration             Peripheral IV 10/28/23 Left Antecubital 247 days    Peripheral IV 07/01/24 Left Antecubital <1 day                          Lab Results: Results: I have personally reviewed all pertinent laboratory/tests results, BMP/CMP:   Lab Results   Component Value Date    SODIUM 141 07/01/2024    K 4.1 07/01/2024     07/01/2024    CO2 30 07/01/2024    BUN 19 07/01/2024    CREATININE 0.94 07/01/2024    CALCIUM 9.1 07/01/2024    EGFR 78 07/01/2024   , and CBC:   Lab Results   Component Value Date    WBC 9.73 07/01/2024    HGB 13.3 07/01/2024    HCT 40.2 07/01/2024    MCV 98 07/01/2024     07/01/2024    RBC 4.11 07/01/2024    MCH 32.4 07/01/2024    MCHC 33.1 07/01/2024    RDW 12.5 07/01/2024    MPV 10.9 07/01/2024    NRBC 0 07/01/2024     Imaging: I have personally reviewed pertinent reports.     Other Studies:     Mark Valadez MD  PGY2

## 2024-07-02 NOTE — PROGRESS NOTES
Patient:    MRN:  [id number]    Aidin Request ID:  [id number]    Level of care reserved:  Skilled Nursing Facility    Partner Reserved:  [facility], [place] [id number] [phone]    Clinical needs requested:    Geography searched:  10 miles around [address]    Start of Service:    Request sent:  10:00am EDT on 7/2/2024 by Errol Bond    Partner reserved:  11:26am EDT on 7/2/2024 by Errol Bond    Choice list shared:  11:26am EDT on 7/2/2024 by Errol Bond

## 2024-07-02 NOTE — CASE MANAGEMENT
Case Management Discharge Planning Note    Patient name Jaspal Brownlee  Location ED 01/ED 01 MRN [id number]  : 1950 Date 2024       Current Admission Date: 2024  Current Admission Diagnosis:Fall   Patient Active Problem List    Diagnosis Date Noted Date Diagnosed    Subdural hemorrhage (HCC) 2024     Fall 2024     Dementia in other diseases classified elsewhere, unspecified severity, with mood disturbance (HCC) 2023     Encounter for person awaiting admission to adequate facility elsewhere 2023     Hypothyroidism 2023     Benign essential HTN 2023     HLD (hyperlipidemia) 2023     TORI on CPAP 2023     Generalized weakness 2023     Elevated serum creatinine 2023     History of B12 deficiency 2023     Macular degeneration 2023     Glaucoma 2023     Age-related cognitive decline 2023     Vertigo 2021       LOS (days): 1  Geometric Mean LOS (GMLOS) (days):   Days to GMLOS:     OBJECTIVE:  Risk of Unplanned Readmission Score: 9.81         Current admission status: Inpatient   Preferred Pharmacy:   [facility]/pharmacy #[id number] - [facility] [address] [address]  Phone: [phone] Fax: [phone]    [id number] - [place] - [address]  [address] [facility] [address]  Phone: [phone] Fax: [phone]    [place] [address]  Phone: [phone] Fax: [phone]    Primary Care Provider: Patrick Reardon MD    Primary Insurance: MEDICARE  Secondary Insurance: [facility]    DISCHARGE DETAILS:      CM spoke to pt's dtr  Pt is a long term resident at [facility].  Plan is for a return there today  Pt will d/c there at 1430 via [facility].  Pt's dtr is in agreement.       CM reviewed d/c planning process including the following: identifying help at home,  patient preference for d/c planning needs, Discharge Lounge, [facility] Meds to Bed program, availability of treatment team to discuss questions or concerns patient and/or family may have regarding understanding medications and recognizing signs and symptoms once discharged.  CM also encouraged patient to follow up with all recommended appointments after discharge. Patient advised of importance for patient and family to participate in managing patient’s medical well being.

## 2024-07-02 NOTE — ASSESSMENT & PLAN NOTE
7/1 CT head: 4 mm in depth along the anterior aspect of the middle cranial fossa on the right, anterior to the temporal tip.  No midline shift  7/2 repeat CT head: new small acute parenchymal hematomas near gray-white matter junction of right anterior frontal lobe, likely evolving traumatic brain contusions.  Improving SDH  -7/2 GCS 14(motor 6, eyes 4, verbal 4), oriented to person only  -HOT protocol  -Keppra 500 mg twice daily  -Every hour neurovascular checks  -Follow-up neurosurgery evaluation

## 2024-07-02 NOTE — ED PROVIDER NOTES
History  Chief Complaint   Patient presents with    Fall     Pt present to the ed after falling at the SNF, per ems patient stood up out of wheel chair when he is wheel chair bound, witness fall, + head strike, - thinners, per staff patient is oriented to self at baseline     73-year-old male history of dementia resides in nursing home presents after fall.  Patient reportedly is wheelchair-bound and unable to walk.  He stood up from his wheelchair today and fell forward striking his head on the ground.  He is not on blood thinners.  He does not recall the event and is unable to provide history.  He has no complaints at this time.  He does have a facial laceration.        Prior to Admission Medications   Prescriptions Last Dose Informant Patient Reported? Taking?   QUEtiapine (SEROquel) 50 mg tablet   No No   Sig: Take 1 tablet (50 mg total) by mouth every 12 (twelve) hours   cyanocobalamin (VITAMIN B-12) 1000 MCG tablet   No No   Sig: Take 1 tablet (1,000 mcg total) by mouth daily Do not start before 2023.   divalproex sodium (DEPAKOTE ER) 500 mg 24 hr tablet   No No   Sig: Take 1 tablet (500 mg total) by mouth daily at bedtime   ferrous sulfate 325 (65 Fe) mg tablet   No No   Sig: Take 1 tablet (325 mg total) by mouth daily with breakfast   fluticasone (FLONASE) 50 mcg/act nasal spray   No No   Si spray into each nostril daily   latanoprost (XALATAN) 0.005 % ophthalmic solution   Yes No   Sig: place 1 drop into both eyes at bedtime   levothyroxine 150 mcg tablet   No No   Sig: Take 1 tablet (150 mcg total) by mouth daily in the early morning Do not start before 2023.   lorazepam (ATIVAN) 0.5 mg/mL GEL transdermal gel   No No   Sig: Place 1 mL (0.5 mg total) on the skin 2 (two) times a day as needed (breakthrough anxiety)   lovastatin (MEVACOR) 40 MG tablet   No No   Sig: Take 2 tablets (80 mg total) by mouth every 24 hours   melatonin 3 mg   No No   Sig: Take 1 tablet (3 mg total) by mouth  daily at bedtime as needed (agitation/difficulty sleeping)      Facility-Administered Medications: None       Past Medical History:   Diagnosis Date    Disease of thyroid gland     Hyperlipidemia     Hypertension        Past Surgical History:   Procedure Laterality Date    HERNIA REPAIR      REPLACEMENT TOTAL KNEE BILATERAL         Family History   Family history unknown: Yes     I have reviewed and agree with the history as documented.    E-Cigarette/Vaping    E-Cigarette Use Never User      E-Cigarette/Vaping Substances     Social History     Tobacco Use    Smoking status: Former     Current packs/day: 0.00     Types: Cigarettes     Quit date: 10/23/2009     Years since quittin.7    Smokeless tobacco: Never   Vaping Use    Vaping status: Never Used   Substance Use Topics    Alcohol use: Not Currently     Comment: no drinks in at least 6 months    Drug use: Never       Review of Systems   Unable to perform ROS: Dementia       Physical Exam  Physical Exam  Constitutional:       General: He is not in acute distress.  HENT:      Head: Normocephalic.      Comments: 2 cm laceration to the right of right eyebrow     Mouth/Throat:      Mouth: Mucous membranes are moist.   Cardiovascular:      Rate and Rhythm: Normal rate.   Pulmonary:      Effort: Pulmonary effort is normal.   Musculoskeletal:         General: No swelling or deformity.   Skin:     General: Skin is warm and dry.   Neurological:      Mental Status: He is alert. Mental status is at baseline.      Comments: Oriented to self, confused speech   Psychiatric:         Mood and Affect: Mood normal.         Vital Signs  ED Triage Vitals [24 1805]   Temperature Pulse Respirations Blood Pressure SpO2   98.8 °F (37.1 °C) 72 16 140/82 96 %      Temp Source Heart Rate Source Patient Position - Orthostatic VS BP Location FiO2 (%)   Oral Monitor Lying Right arm --      Pain Score       --           Vitals:    24 1805   BP: 140/82   Pulse: 72   Patient  Position - Orthostatic VS: Lying         Visual Acuity      ED Medications  Medications   lidocaine-epinephrine (XYLOCAINE/EPINEPHRINE) 1 %-1:100,000 injection 5 mL (5 mL Infiltration Given by Other 7/1/24 1954)       Diagnostic Studies  Results Reviewed       Procedure Component Value Units Date/Time    [place] draw [[id number]] Collected: 07/01/24 1956    Lab Status: In process Specimen: Blood from Arm, Left Updated: 07/01/24 2201    Narrative:      The following orders were created for panel order [place] draw.  Procedure                               Abnormality         Status                     ---------                               -----------         ------                     Gold top on hold[[id number]]                                 Final result               Green / Black tube on hold[[id number]]                       Final result               Lavender Top 7ml on hold[[id number]]                         In process                   Please view results for these tests on the individual orders.    Basic metabolic panel [[id number]] Collected: 07/01/24 1953    Lab Status: Final result Specimen: Blood from Arm, Left Updated: 07/01/24 2023     Sodium 141 mmol/L      Potassium 4.1 mmol/L      Chloride 104 mmol/L      CO2 30 mmol/L      ANION GAP 7 mmol/L      BUN 19 mg/dL      Creatinine 0.94 mg/dL      Glucose 108 mg/dL      Calcium 9.1 mg/dL      eGFR 78 ml/min/1.73sq m     Narrative:      National Kidney Disease Foundation guidelines for Chronic Kidney Disease (CKD):     Stage 1 with normal or high GFR (GFR > 90 mL/min/1.73 square meters)    Stage 2 Mild CKD (GFR = 60-89 mL/min/1.73 square meters)    Stage 3A Moderate CKD (GFR = 45-59 mL/min/1.73 square meters)    Stage 3B Moderate CKD (GFR = 30-44 mL/min/1.73 square meters)    Stage 4 Severe CKD (GFR = 15-29 mL/min/1.73 square meters)    Stage 5 End Stage CKD (GFR <15 mL/min/1.73 square meters)  Note: GFR calculation is accurate only with a steady state  creatinine    Protime-INR [[id number]]  (Abnormal) Collected: 07/01/24 1953    Lab Status: Final result Specimen: Blood from Arm, Left Updated: 07/01/24 2017     Protime 14.6 seconds      INR 1.15    APTT [[id number]]  (Normal) Collected: 07/01/24 1953    Lab Status: Final result Specimen: Blood from Arm, Left Updated: 07/01/24 2017     PTT 31 seconds     CBC and differential [[id number]] Collected: 07/01/24 1953    Lab Status: Final result Specimen: Blood from Arm, Left Updated: 07/01/24 2005     WBC 9.73 Thousand/uL      RBC 4.11 Million/uL      Hemoglobin 13.3 g/dL      Hematocrit 40.2 %      MCV 98 fL      MCH 32.4 pg      MCHC 33.1 g/dL      RDW 12.5 %      MPV 10.9 fL      Platelets 193 Thousands/uL      nRBC 0 /100 WBCs      Segmented % 74 %      Immature Grans % 0 %      Lymphocytes % 16 %      Monocytes % 9 %      Eosinophils Relative 1 %      Basophils Relative 0 %      Absolute Neutrophils 7.11 Thousands/µL      Absolute Immature Grans 0.03 Thousand/uL      Absolute Lymphocytes 1.54 Thousands/µL      Absolute Monocytes 0.89 Thousand/µL      Eosinophils Absolute 0.13 Thousand/µL      Basophils Absolute 0.03 Thousands/µL                    CT head without contrast   Final Result by Errol Mixon DO (07/01 1943)   Shallow subdural 4 mm in depth along the anterior aspect of the middle cranial fossa on the right, anterior to the temporal tip. No mass effect. This is new from October 2023.      I personally discussed this study with JESSIE JOSUE on 7/1/2024 7:42 PM.                        I personally discussed this study with JESSIE JOSUE on 7/1/2024 7:42 PM.            Workstation performed: [id number]                    Procedures  Universal Protocol:  Consent given by: patient  Patient identity confirmed: verbally with patient and provided demographic data  Laceration repair    Date/Time: 7/1/2024 8:00 PM    Performed by: Jessie Josue MD  Authorized by: Jessie Josue MD  Body area:  head/neck  Location details: right eyebrow  Laceration length: 2 cm  Foreign bodies: no foreign bodies  Anesthesia: local infiltration    Anesthesia:  Local Anesthetic: lidocaine 1% with epinephrine  Anesthetic total: 5 mL    Sedation:  Patient sedated: no      Wound Dehiscence:  Superficial Wound Dehiscence: simple closure      Procedure Details:  Irrigation solution: saline  Irrigation method: syringe  Amount of cleaning: standard  Skin closure: 4-0 nylon  Number of sutures: 3  Technique: simple  Approximation: close  Approximation difficulty: simple  Patient tolerance: patient tolerated the procedure well with no immediate complications               ED Course       73-year-old male presenting the ED after a witnessed fall.  Patient has dementia and is at his baseline alert and oriented to self only.  He exhibits confused speech but is awake and alert with normal vitals.  He has a 2 cm laceration to the right of his eye near the lateral aspect of his eyebrow.  No other evidence of trauma on exam.  CT scan of the head was obtained which was notable for small subdural hematoma without associated mass effect.  Transfer initiated to [facility] trauma service.  Patient accepted by Dr. Gould.  I sutured the facial laceration.  Screening labs were obtained and an IV was placed.  Patient transferred without incident.                        SBIRT 20yo+      Flowsheet Row Most Recent Value   Initial Alcohol Screen: US AUDIT-C     1. How often do you have a drink containing alcohol? 0 Filed at: 07/01/2024 1805   2. How many drinks containing alcohol do you have on a typical day you are drinking?  0 Filed at: 07/01/2024 1805   3a. Male UNDER 65: How often do you have five or more drinks on one occasion? 0 Filed at: 07/01/2024 1805   3b. FEMALE Any Age, or MALE 65+: How often do you have 4 or more drinks on one occassion? 0 Filed at: 07/01/2024 1805   Audit-C Score 0 Filed at: 07/01/2024 1805                      Medical  Decision Making  Amount and/or Complexity of Data Reviewed  Labs: ordered.  Radiology: ordered.    Risk  Prescription drug management.             Disposition  Final diagnoses:   Fall, initial encounter   Traumatic subdural hematoma (HCC)     Time reflects when diagnosis was documented in both MDM as applicable and the Disposition within this note       Time User Action Codes Description Comment    7/1/2024  7:43 PM Jessie Blair [W19.XXXA] Fall, initial encounter     7/1/2024  7:43 PM Jessie Blair [S06.5XAA] Traumatic subdural hematoma (HCC)           ED Disposition       ED Disposition   Transfer to Another Facility-In Network    Condition   --    Date/Time   Mon Jul 1, 2024 1944    Comment   Jaspal Brownlee should be transferred out to [facility].               MD Documentation      Flowsheet Row Most Recent Value   Patient Condition The patient has been stabilized such that within reasonable medical probability, no material deterioration of the patient condition or the condition of the unborn child(maurisio) is likely to result from the transfer   Reason for Transfer Level of Care needed not available at this facility   Benefits of Transfer Specialized equipment and/or services available at the receiving facility (Include comment)________________________   Risks of Transfer Potential for delay in receiving treatment, Potential deterioration of medical condition   Accepting Physician Dr. Gould   Accepting Facility Name, City & State  [facility]   Sending MD Blair   Provider Certification General risk, such as traffic hazards, adverse weather conditions, rough terrain or turbulence, possible failure of equipment (including vehicle or aircraft), or consequences of actions of persons outside the control of the transport personnel          RN Documentation      Flowsheet Row Most Recent Value   Accepting Facility [facility], [facility]          Follow-up Information    None         Discharge Medication List as of  7/1/2024  8:49 PM        CONTINUE these medications which have NOT CHANGED    Details   cyanocobalamin (VITAMIN B-12) 1000 MCG tablet Take 1 tablet (1,000 mcg total) by mouth daily Do not start before June 30, 2023., Starting Fri 6/30/2023, Normal      divalproex sodium (DEPAKOTE ER) 500 mg 24 hr tablet Take 1 tablet (500 mg total) by mouth daily at bedtime, Starting Thu 6/29/2023, Normal      ferrous sulfate 325 (65 Fe) mg tablet Take 1 tablet (325 mg total) by mouth daily with breakfast, Starting Tue 6/13/2023, No Print      fluticasone (FLONASE) 50 mcg/act nasal spray 1 spray into each nostril daily, Starting Sun 3/20/2022, Normal      latanoprost (XALATAN) 0.005 % ophthalmic solution place 1 drop into both eyes at bedtime, Historical Med      levothyroxine 150 mcg tablet Take 1 tablet (150 mcg total) by mouth daily in the early morning Do not start before June 30, 2023., Starting Fri 6/30/2023, Normal      lorazepam (ATIVAN) 0.5 mg/mL GEL transdermal gel Place 1 mL (0.5 mg total) on the skin 2 (two) times a day as needed (breakthrough anxiety), Starting Sat 7/1/2023, Normal      lovastatin (MEVACOR) 40 MG tablet Take 2 tablets (80 mg total) by mouth every 24 hours, Starting Tue 6/13/2023, No Print      melatonin 3 mg Take 1 tablet (3 mg total) by mouth daily at bedtime as needed (agitation/difficulty sleeping), Starting Thu 6/29/2023, Normal      QUEtiapine (SEROquel) 50 mg tablet Take 1 tablet (50 mg total) by mouth every 12 (twelve) hours, Starting Thu 6/29/2023, Normal             No discharge procedures on file.    PDMP Review         Value Time User    PDMP Reviewed  Yes 7/1/2023  8:04 PM Kenyn Cevallos MD            ED Provider  Electronically Signed by             Jessie Burnett MD  07/02/24 0020

## 2024-07-02 NOTE — TELEPHONE ENCOUNTER
7/2/24 - PT IN [facility]  7/19/24 2 WK HFU W/CTH    BARBARA Loya Clerical  Please schedule 2 week f/u with [facility], ap solo

## 2024-07-02 NOTE — ASSESSMENT & PLAN NOTE
Patient presenting as a fall from his dementia facility, initial workup positive for right eyebrow laceration and SDH  -See SDH plan  -Right eyebrow laceration repaired in ED   -Outpatient follow-up for suture removal in the trauma clinic  -Follow-up geriatric medicine consultation  -PT/OT  -Pain control as needed  -Bowel regimen  -Fall Precautions  -Disposition planning

## 2024-07-03 LAB — HOLD SPECIMEN: NORMAL

## 2024-07-03 NOTE — TELEPHONE ENCOUNTER
The [facility] facility called to verify pts HFU appt. Confirmed appt for 7/19.   Made facility aware that Pt needs CT head done 2-3 days prior to appt.     Transferred to central scheduling to make CT appt.

## 2024-07-16 ENCOUNTER — HOSPITAL ENCOUNTER (OUTPATIENT)
Dept: CT IMAGING | Facility: HOSPITAL | Age: 74
Discharge: HOME | End: 2024-07-16
Payer: MEDICARE

## 2024-07-16 DIAGNOSIS — I62.00 SUBDURAL HEMORRHAGE (HCC): ICD-10-CM

## 2024-07-16 PROCEDURE — 70450 CT HEAD/BRAIN W/O DYE: CPT

## 2024-07-25 ENCOUNTER — TELEMEDICINE (OUTPATIENT)
Dept: NEUROSURGERY | Facility: CLINIC | Age: 74
End: 2024-07-25
Payer: MEDICARE

## 2024-07-25 DIAGNOSIS — I62.00 SUBDURAL HEMORRHAGE (HCC): Primary | ICD-10-CM

## 2024-07-25 PROCEDURE — 99442 PR PHYS/QHP TELEPHONE EVALUATION 11-20 MIN: CPT | Performed by: NURSE PRACTITIONER

## 2024-07-25 NOTE — PROGRESS NOTES
Virtual Regular Visit  Name: Jaspal Brownlee      : 1950      MRN: [id number]  Encounter Provider: VEE Shaw  Encounter Date: 2024   Encounter department: [facility] NEUROSURGICAL [facility]    Verification of patient location:    Patient is located at Other in the following state in which I hold an active license [place]    Assessment & Plan   1. Subdural hemorrhage (HCC)  Assessment & Plan:  Patient seen outpatient office today for approximately 2-week hospital follow-up with repeat CT head  R middle cranial fossa SDH and right anterior frontal lobe parenchymal hematomas, s/p fall out of wheelchair at nursing facility on   + head strike, no LOC  No AC/AP medications at baseline  + dementia, oriented to self only at baseline    Imaging:   CT head w/o, 2024:No acute intracranial abnormality. Resolution of the previously seen small hyperdensities within the right anterior frontal lobe suggestive of resolved hemorrhagic contusions.Stable moderate chronic microangiopathic changes within the brain.    Plan:  Continue neurological checks  Patient will follow-up as needed or symptoms worsen  Reviewed imaging with nurse Shasta Vegas made aware to contact neurosurgery with any further questions or concerns.  She is aware of patient develops any new or worsening neurological change or red flag signs to seek medical attention        Encounter provider VEE Shaw    The patient was identified by name and date of birth. Jaspal Brownlee was informed that this is a telemedicine visit and that the visit is being conducted through Telephone.  My office door was closed. No one else was in the room.  He acknowledged consent and understanding of privacy and security of the video platform. The patient has agreed to participate and understands they can discontinue the visit at any time.    Patient is aware this is a billable service.     It was my intent to perform  this visit via video technology but the patient was not able to do a video connection so the visit was completed via audio telephone only.     History of Present Illness     Jaspal Brownlee is a 73 y.o. male with past medical history significant for hypertension, hypothyroidism, dementia, macular degeneration, glaucoma, vertigo, and hyperlipidemia.  Patient seen in outpatient office today for 2-week hospital follow-up for right anterior frontal lobe parenchymal hematomas and right middle cranial fossa SDH.    Patient initially presented to the ED on 7/1 after he fell forward from his wheelchair.  He is wheelchair-bound at baseline.  He did hit his head but no reported LOC.  He does not take any AC/AP medication at baseline.    Spoke with nurse Vegas at facility, she states patient is eating and has dementia and does not talk, she would not let me speak with patient.  This was done over the telephone.  Nurse states he has been on his baseline other than with physical therapy is noted to lean more to the left and has not been feeding himself as much as usual.  He can walk with assistance, he does not use a cane or a walker.  He does not complain of anything per report.  No recent falls or traumas at facility.  Updated nurse blood has resolved on repeat scan.  Patient to follow-up as needed or if symptoms worsen.    Review of Systems   Unable to perform ROS: Dementia       There were no vitals taken for this visit.    Unable to do exam as visit was done over the telephone with nurse Vegas and not patient.  But she states he is at his baseline and able to walk with assistance.    Visit Time  Total Visit Duration: 20

## 2024-07-25 NOTE — ASSESSMENT & PLAN NOTE
Patient seen outpatient office today for approximately 2-week hospital follow-up with repeat CT head  R middle cranial fossa SDH and right anterior frontal lobe parenchymal hematomas, s/p fall out of wheelchair at nursing facility on 7/1  + head strike, no LOC  No AC/AP medications at baseline  + dementia, oriented to self only at baseline    Imaging:   CT head w/o, 7/16/2024:No acute intracranial abnormality. Resolution of the previously seen small hyperdensities within the right anterior frontal lobe suggestive of resolved hemorrhagic contusions.Stable moderate chronic microangiopathic changes within the brain.    Plan:  Continue neurological checks  Patient will follow-up as needed or symptoms worsen  Reviewed imaging with nurse Shasta Vegas made aware to contact neurosurgery with any further questions or concerns.  She is aware of patient develops any new or worsening neurological change or red flag signs to seek medical attention

## 2024-08-17 ENCOUNTER — HOSPITAL ENCOUNTER (INPATIENT)
Facility: HOSPITAL | Age: 74
LOS: 3 days | Discharge: NURSING, REHAB OR LONG-TERM CARE | DRG: 682 | End: 2024-08-20
Attending: EMERGENCY MEDICINE | Admitting: INTERNAL MEDICINE
Payer: MEDICARE

## 2024-08-17 ENCOUNTER — APPOINTMENT (EMERGENCY)
Dept: CT IMAGING | Facility: HOSPITAL | Age: 74
DRG: 682 | End: 2024-08-17
Payer: MEDICARE

## 2024-08-17 ENCOUNTER — APPOINTMENT (INPATIENT)
Dept: CT IMAGING | Facility: HOSPITAL | Age: 74
DRG: 682 | End: 2024-08-17
Payer: MEDICARE

## 2024-08-17 DIAGNOSIS — K59.01 SLOW TRANSIT CONSTIPATION: ICD-10-CM

## 2024-08-17 DIAGNOSIS — E87.0 HYPERNATREMIA: ICD-10-CM

## 2024-08-17 DIAGNOSIS — N39.0 UTI (URINARY TRACT INFECTION): ICD-10-CM

## 2024-08-17 DIAGNOSIS — Z51.5 HOSPICE CARE PATIENT: ICD-10-CM

## 2024-08-17 DIAGNOSIS — R33.9 URINARY RETENTION: ICD-10-CM

## 2024-08-17 DIAGNOSIS — K52.89 STERCORAL COLITIS: ICD-10-CM

## 2024-08-17 DIAGNOSIS — K59.00 CONSTIPATION: ICD-10-CM

## 2024-08-17 DIAGNOSIS — N17.9 AKI (ACUTE KIDNEY INJURY) (HCC): Primary | ICD-10-CM

## 2024-08-17 DIAGNOSIS — N30.01 ACUTE CYSTITIS WITH HEMATURIA: ICD-10-CM

## 2024-08-17 PROBLEM — R60.0 UNILATERAL EDEMA OF LOWER EXTREMITY: Status: ACTIVE | Noted: 2024-08-17

## 2024-08-17 PROBLEM — E86.0 DEHYDRATION: Status: ACTIVE | Noted: 2024-08-17

## 2024-08-17 LAB
ALBUMIN SERPL BCG-MCNC: 3.7 G/DL (ref 3.5–5)
ALP SERPL-CCNC: 94 U/L (ref 34–104)
ALT SERPL W P-5'-P-CCNC: 13 U/L (ref 7–52)
ANION GAP SERPL CALCULATED.3IONS-SCNC: 15 MMOL/L (ref 4–13)
ANION GAP SERPL CALCULATED.3IONS-SCNC: 15 MMOL/L (ref 4–13)
APTT PPP: 40 SECONDS (ref 23–34)
APTT PPP: 84 SECONDS (ref 23–34)
AST SERPL W P-5'-P-CCNC: 18 U/L (ref 13–39)
ATRIAL RATE: 95 BPM
BACTERIA UR QL AUTO: ABNORMAL /HPF
BASOPHILS # BLD AUTO: 0.02 THOUSANDS/ÂΜL (ref 0–0.1)
BASOPHILS NFR BLD AUTO: 0 % (ref 0–1)
BILIRUB SERPL-MCNC: 1.27 MG/DL (ref 0.2–1)
BILIRUB UR QL STRIP: ABNORMAL
BUN SERPL-MCNC: 52 MG/DL (ref 5–25)
BUN SERPL-MCNC: 56 MG/DL (ref 5–25)
CALCIUM SERPL-MCNC: 8.5 MG/DL (ref 8.4–10.2)
CALCIUM SERPL-MCNC: 8.8 MG/DL (ref 8.4–10.2)
CHLORIDE SERPL-SCNC: 110 MMOL/L (ref 96–108)
CHLORIDE SERPL-SCNC: 110 MMOL/L (ref 96–108)
CK SERPL-CCNC: 62 U/L (ref 39–308)
CLARITY UR: ABNORMAL
CO2 SERPL-SCNC: 25 MMOL/L (ref 21–32)
CO2 SERPL-SCNC: 25 MMOL/L (ref 21–32)
COLOR UR: ABNORMAL
CREAT SERPL-MCNC: 2.34 MG/DL (ref 0.6–1.3)
CREAT SERPL-MCNC: 3.5 MG/DL (ref 0.6–1.3)
EOSINOPHIL # BLD AUTO: 0 THOUSAND/ÂΜL (ref 0–0.61)
EOSINOPHIL NFR BLD AUTO: 0 % (ref 0–6)
ERYTHROCYTE [DISTWIDTH] IN BLOOD BY AUTOMATED COUNT: 13.1 % (ref 11.6–15.1)
GFR SERPL CREATININE-BSD FRML MDRD: 16 ML/MIN/1.73SQ M
GFR SERPL CREATININE-BSD FRML MDRD: 26 ML/MIN/1.73SQ M
GLUCOSE SERPL-MCNC: 136 MG/DL (ref 65–140)
GLUCOSE SERPL-MCNC: 157 MG/DL (ref 65–140)
GLUCOSE UR STRIP-MCNC: NEGATIVE MG/DL
HCT VFR BLD AUTO: 40.5 % (ref 36.5–49.3)
HGB BLD-MCNC: 13.2 G/DL (ref 12–17)
HGB UR QL STRIP.AUTO: NEGATIVE
IMM GRANULOCYTES # BLD AUTO: 0.07 THOUSAND/UL (ref 0–0.2)
IMM GRANULOCYTES NFR BLD AUTO: 1 % (ref 0–2)
INR PPP: 1.37 (ref 0.85–1.19)
KETONES UR STRIP-MCNC: NEGATIVE MG/DL
LEUKOCYTE ESTERASE UR QL STRIP: NEGATIVE
LIPASE SERPL-CCNC: 40 U/L (ref 11–82)
LYMPHOCYTES # BLD AUTO: 0.93 THOUSANDS/ÂΜL (ref 0.6–4.47)
LYMPHOCYTES NFR BLD AUTO: 6 % (ref 14–44)
MCH RBC QN AUTO: 32 PG (ref 26.8–34.3)
MCHC RBC AUTO-ENTMCNC: 32.6 G/DL (ref 31.4–37.4)
MCV RBC AUTO: 98 FL (ref 82–98)
MONOCYTES # BLD AUTO: 1.44 THOUSAND/ÂΜL (ref 0.17–1.22)
MONOCYTES NFR BLD AUTO: 10 % (ref 4–12)
MUCOUS THREADS UR QL AUTO: ABNORMAL
NEUTROPHILS # BLD AUTO: 12.61 THOUSANDS/ÂΜL (ref 1.85–7.62)
NEUTS SEG NFR BLD AUTO: 83 % (ref 43–75)
NITRITE UR QL STRIP: POSITIVE
NON-SQ EPI CELLS URNS QL MICRO: ABNORMAL /HPF
NRBC BLD AUTO-RTO: 0 /100 WBCS
P AXIS: 51 DEGREES
PH UR STRIP.AUTO: 6 [PH]
PLATELET # BLD AUTO: 317 THOUSANDS/UL (ref 149–390)
PMV BLD AUTO: 10.5 FL (ref 8.9–12.7)
POTASSIUM SERPL-SCNC: 3.4 MMOL/L (ref 3.5–5.3)
POTASSIUM SERPL-SCNC: 3.5 MMOL/L (ref 3.5–5.3)
PR INTERVAL: 180 MS
PROT SERPL-MCNC: 7.2 G/DL (ref 6.4–8.4)
PROT UR STRIP-MCNC: ABNORMAL MG/DL
PROTHROMBIN TIME: 17.3 SECONDS (ref 12.3–15)
QRS AXIS: -13 DEGREES
QRSD INTERVAL: 80 MS
QT INTERVAL: 344 MS
QTC INTERVAL: 432 MS
RBC # BLD AUTO: 4.13 MILLION/UL (ref 3.88–5.62)
RBC #/AREA URNS AUTO: ABNORMAL /HPF
SODIUM SERPL-SCNC: 150 MMOL/L (ref 135–147)
SODIUM SERPL-SCNC: 150 MMOL/L (ref 135–147)
SP GR UR STRIP.AUTO: 1.02 (ref 1–1.03)
T WAVE AXIS: 26 DEGREES
UROBILINOGEN UR QL STRIP.AUTO: 1 E.U./DL
VENTRICULAR RATE: 95 BPM
WBC # BLD AUTO: 15.07 THOUSAND/UL (ref 4.31–10.16)
WBC #/AREA URNS AUTO: ABNORMAL /HPF

## 2024-08-17 PROCEDURE — 80053 COMPREHEN METABOLIC PANEL: CPT | Performed by: EMERGENCY MEDICINE

## 2024-08-17 PROCEDURE — 96367 TX/PROPH/DG ADDL SEQ IV INF: CPT

## 2024-08-17 PROCEDURE — 83690 ASSAY OF LIPASE: CPT | Performed by: EMERGENCY MEDICINE

## 2024-08-17 PROCEDURE — 96366 THER/PROPH/DIAG IV INF ADDON: CPT

## 2024-08-17 PROCEDURE — 81001 URINALYSIS AUTO W/SCOPE: CPT | Performed by: EMERGENCY MEDICINE

## 2024-08-17 PROCEDURE — 93005 ELECTROCARDIOGRAM TRACING: CPT

## 2024-08-17 PROCEDURE — 96365 THER/PROPH/DIAG IV INF INIT: CPT

## 2024-08-17 PROCEDURE — 70450 CT HEAD/BRAIN W/O DYE: CPT

## 2024-08-17 PROCEDURE — 85730 THROMBOPLASTIN TIME PARTIAL: CPT | Performed by: INTERNAL MEDICINE

## 2024-08-17 PROCEDURE — 87081 CULTURE SCREEN ONLY: CPT | Performed by: INTERNAL MEDICINE

## 2024-08-17 PROCEDURE — 85025 COMPLETE CBC W/AUTO DIFF WBC: CPT | Performed by: EMERGENCY MEDICINE

## 2024-08-17 PROCEDURE — 99285 EMERGENCY DEPT VISIT HI MDM: CPT | Performed by: EMERGENCY MEDICINE

## 2024-08-17 PROCEDURE — 99223 1ST HOSP IP/OBS HIGH 75: CPT | Performed by: INTERNAL MEDICINE

## 2024-08-17 PROCEDURE — 85610 PROTHROMBIN TIME: CPT | Performed by: INTERNAL MEDICINE

## 2024-08-17 PROCEDURE — 36415 COLL VENOUS BLD VENIPUNCTURE: CPT | Performed by: EMERGENCY MEDICINE

## 2024-08-17 PROCEDURE — 80048 BASIC METABOLIC PNL TOTAL CA: CPT | Performed by: INTERNAL MEDICINE

## 2024-08-17 PROCEDURE — 99285 EMERGENCY DEPT VISIT HI MDM: CPT

## 2024-08-17 PROCEDURE — 74176 CT ABD & PELVIS W/O CONTRAST: CPT

## 2024-08-17 PROCEDURE — 82550 ASSAY OF CK (CPK): CPT | Performed by: EMERGENCY MEDICINE

## 2024-08-17 RX ORDER — PRAVASTATIN SODIUM 40 MG
40 TABLET ORAL DAILY
Status: DISCONTINUED | OUTPATIENT
Start: 2024-08-17 | End: 2024-08-19

## 2024-08-17 RX ORDER — ACETAMINOPHEN 10 MG/ML
1000 INJECTION, SOLUTION INTRAVENOUS ONCE
Status: COMPLETED | OUTPATIENT
Start: 2024-08-17 | End: 2024-08-17

## 2024-08-17 RX ORDER — ONDANSETRON 2 MG/ML
4 INJECTION INTRAMUSCULAR; INTRAVENOUS EVERY 6 HOURS PRN
Status: DISCONTINUED | OUTPATIENT
Start: 2024-08-17 | End: 2024-08-20

## 2024-08-17 RX ORDER — SENNOSIDES 8.6 MG
2 TABLET ORAL 2 TIMES DAILY
Status: DISCONTINUED | OUTPATIENT
Start: 2024-08-17 | End: 2024-08-19

## 2024-08-17 RX ORDER — LEVOTHYROXINE SODIUM 125 UG/1
125 TABLET ORAL DAILY
Status: DISCONTINUED | OUTPATIENT
Start: 2024-08-18 | End: 2024-08-18

## 2024-08-17 RX ORDER — DEXTROSE MONOHYDRATE 50 MG/ML
75 INJECTION, SOLUTION INTRAVENOUS CONTINUOUS
Status: DISCONTINUED | OUTPATIENT
Start: 2024-08-17 | End: 2024-08-18

## 2024-08-17 RX ORDER — NYSTATIN 100000 [USP'U]/G
POWDER TOPICAL 2 TIMES DAILY
Status: DISCONTINUED | OUTPATIENT
Start: 2024-08-17 | End: 2024-08-20 | Stop reason: HOSPADM

## 2024-08-17 RX ORDER — LORAZEPAM 0.5 MG/1
0.5 TABLET ORAL EVERY 8 HOURS
Status: DISCONTINUED | OUTPATIENT
Start: 2024-08-17 | End: 2024-08-19

## 2024-08-17 RX ORDER — CEFTRIAXONE 1 G/50ML
1000 INJECTION, SOLUTION INTRAVENOUS ONCE
Status: COMPLETED | OUTPATIENT
Start: 2024-08-17 | End: 2024-08-17

## 2024-08-17 RX ORDER — POLYETHYLENE GLYCOL 3350 17 G/17G
17 POWDER, FOR SOLUTION ORAL 2 TIMES DAILY
Status: DISCONTINUED | OUTPATIENT
Start: 2024-08-17 | End: 2024-08-19

## 2024-08-17 RX ORDER — HEPARIN SODIUM 10000 [USP'U]/100ML
3-30 INJECTION, SOLUTION INTRAVENOUS
Status: DISCONTINUED | OUTPATIENT
Start: 2024-08-17 | End: 2024-08-20 | Stop reason: HOSPADM

## 2024-08-17 RX ORDER — ACETAMINOPHEN 325 MG/1
650 TABLET ORAL EVERY 6 HOURS PRN
Status: DISCONTINUED | OUTPATIENT
Start: 2024-08-17 | End: 2024-08-19

## 2024-08-17 RX ORDER — QUETIAPINE FUMARATE 25 MG/1
50 TABLET, FILM COATED ORAL 2 TIMES DAILY
Status: DISCONTINUED | OUTPATIENT
Start: 2024-08-17 | End: 2024-08-19

## 2024-08-17 RX ORDER — AMOXICILLIN 250 MG
1 CAPSULE ORAL DAILY
Status: DISCONTINUED | OUTPATIENT
Start: 2024-08-17 | End: 2024-08-17

## 2024-08-17 RX ORDER — QUETIAPINE FUMARATE 100 MG/1
100 TABLET, FILM COATED ORAL 2 TIMES DAILY
Status: DISCONTINUED | OUTPATIENT
Start: 2024-08-17 | End: 2024-08-19

## 2024-08-17 RX ORDER — LEVETIRACETAM 500 MG/1
500 TABLET ORAL EVERY 12 HOURS SCHEDULED
Status: DISCONTINUED | OUTPATIENT
Start: 2024-08-17 | End: 2024-08-17

## 2024-08-17 RX ORDER — CEFTRIAXONE 1 G/50ML
1000 INJECTION, SOLUTION INTRAVENOUS EVERY 24 HOURS
Status: DISCONTINUED | OUTPATIENT
Start: 2024-08-18 | End: 2024-08-20

## 2024-08-17 RX ADMIN — SENNOSIDES 17.2 MG: 8.6 TABLET, FILM COATED ORAL at 17:51

## 2024-08-17 RX ADMIN — CYANOCOBALAMIN TAB 500 MCG 1000 MCG: 500 TAB at 13:17

## 2024-08-17 RX ADMIN — NYSTATIN: 100000 POWDER TOPICAL at 17:51

## 2024-08-17 RX ADMIN — CEFTRIAXONE 1000 MG: 1 INJECTION, SOLUTION INTRAVENOUS at 09:17

## 2024-08-17 RX ADMIN — SERTRALINE HYDROCHLORIDE 50 MG: 50 TABLET ORAL at 13:17

## 2024-08-17 RX ADMIN — DEXTROSE 50 ML/HR: 5 SOLUTION INTRAVENOUS at 13:28

## 2024-08-17 RX ADMIN — PRAVASTATIN SODIUM 40 MG: 40 TABLET ORAL at 13:17

## 2024-08-17 RX ADMIN — SENNOSIDES 17.2 MG: 8.6 TABLET, FILM COATED ORAL at 13:17

## 2024-08-17 RX ADMIN — SODIUM CHLORIDE, SODIUM LACTATE, POTASSIUM CHLORIDE, AND CALCIUM CHLORIDE 1000 ML: .6; .31; .03; .02 INJECTION, SOLUTION INTRAVENOUS at 09:01

## 2024-08-17 RX ADMIN — QUETIAPINE FUMARATE 100 MG: 100 TABLET ORAL at 21:30

## 2024-08-17 RX ADMIN — HEPARIN SODIUM 18 UNITS/KG/HR: 10000 INJECTION, SOLUTION INTRAVENOUS at 14:55

## 2024-08-17 RX ADMIN — POLYETHYLENE GLYCOL 3350 17 G: 17 POWDER, FOR SOLUTION ORAL at 17:51

## 2024-08-17 RX ADMIN — LORAZEPAM 0.5 MG: 0.5 TABLET ORAL at 21:31

## 2024-08-17 RX ADMIN — POLYETHYLENE GLYCOL 3350 17 G: 17 POWDER, FOR SOLUTION ORAL at 13:16

## 2024-08-17 RX ADMIN — ACETAMINOPHEN 1000 MG: 10 INJECTION INTRAVENOUS at 09:50

## 2024-08-17 RX ADMIN — QUETIAPINE FUMARATE 50 MG: 25 TABLET ORAL at 21:30

## 2024-08-17 NOTE — QUICK NOTE
Patient seen and examined.  Rectal exam performed firm hard stool noted.  Attempted disimpaction with little results.    CT head negative for any signs of hemorrhage    Given clinical suspicion on exam and CT of the abdomen for femoral DVT will start heparin drip    Question of masslike opacity in the abdominal wall noted.  Physical exam the abdomen is benign with no obvious mass will order abdominal ultrasound to evaluate

## 2024-08-17 NOTE — ASSESSMENT & PLAN NOTE
Suspect multifactorial secondary to dehydration and urinary retention  Lugo catheter in place  Maintain for now  IV fluid as below

## 2024-08-17 NOTE — ASSESSMENT & PLAN NOTE
Patient with left leg swelling and concern for left femoral DVT  Venous duplex  Patient with subdural hemorrhage on July 1  Patient now greater than 6 weeks status post that event may need full dose anticoagulation will discuss with neurosurgery

## 2024-08-17 NOTE — ASSESSMENT & PLAN NOTE
As evidenced by serum sodium of 150  Patient with approximately 3 L water deficit  Will start D5W 50 cc an hour repeat BMP later today

## 2024-08-17 NOTE — ED PROVIDER NOTES
History  Chief Complaint   Patient presents with    Constipation     Brought in by EMS, per staff at SNF pt has not had a bowel movement in 5 days. Pt was given laxative and suppository without improvement     73-year-old male with history of hypertension, hyperlipidemia, dementia, subdural hematoma presenting for evaluation of constipation.  History is provided by patient's nursing home facility.  They report that patient had not had a bowel movement for a few days, KUB was performed 3 days ago which reportedly showed impaction.  Nursing home providers placed patient on Dulcolax suppositories, however, patient has not had a bowel movement for the past 3 days.  He was sent into the ED for further evaluation this morning.  Per nursing home, patient's normal mental status is responsive to pain only.  Patient is largely nonverbal and does not follow commands.        Prior to Admission Medications   Prescriptions Last Dose Informant Patient Reported? Taking?   LORazepam (ATIVAN) 0.5 mg tablet   Yes No   Sig: Take 0.5 mg by mouth every 8 (eight) hours   QUEtiapine (SEROquel) 100 mg tablet   Yes No   Sig: Take 100 mg by mouth 2 (two) times a day Takes with 50mg tabs to total 150mg BID   QUEtiapine (SEROquel) 50 mg tablet   Yes No   Sig: Take 50 mg by mouth 2 (two) times a day TAKES WITH 100mg tab to total 150mg BID   acetaminophen (TYLENOL) 650 mg CR tablet   Yes No   Sig: Take 650 mg by mouth 4 (four) times a day   cyanocobalamin (VITAMIN B-12) 1000 MCG tablet   No No   Sig: Take 1 tablet (1,000 mcg total) by mouth daily Do not start before June 30, 2023.   levETIRAcetam (Keppra) 500 mg tablet   No No   Sig: Take 1 tablet (500 mg total) by mouth every 12 (twelve) hours for 6 days   levothyroxine 125 mcg tablet   Yes No   Sig: Take 125 mcg by mouth daily   pravastatin (PRAVACHOL) 40 mg tablet   Yes No   Sig: Take 40 mg by mouth daily   senna-docusate sodium (SENOKOT-S) 8.6-50 mg per tablet   Yes No   Sig: Take 1 tablet by  mouth daily   sertraline (ZOLOFT) 50 mg tablet   Yes No   Sig: Take 50 mg by mouth daily      Facility-Administered Medications: None       Past Medical History:   Diagnosis Date    Disease of thyroid gland     Hyperlipidemia     Hypertension        Past Surgical History:   Procedure Laterality Date    HERNIA REPAIR      REPLACEMENT TOTAL KNEE BILATERAL         Family History   Family history unknown: Yes     I have reviewed and agree with the history as documented.    E-Cigarette/Vaping    E-Cigarette Use Never User      E-Cigarette/Vaping Substances     Social History     Tobacco Use    Smoking status: Former     Current packs/day: 0.00     Types: Cigarettes     Quit date: 10/23/2009     Years since quittin.8    Smokeless tobacco: Never   Vaping Use    Vaping status: Never Used   Substance Use Topics    Alcohol use: Not Currently     Comment: no drinks in at least 6 months    Drug use: Never       Review of Systems   Unable to perform ROS: Patient nonverbal       Physical Exam  Physical Exam  Vitals reviewed.   Constitutional:       General: He is not in acute distress.     Comments: Patient appears chronically ill.   HENT:      Nose: Nose normal.      Mouth/Throat:      Mouth: Mucous membranes are moist.   Eyes:      Conjunctiva/sclera: Conjunctivae normal.   Cardiovascular:      Rate and Rhythm: Normal rate and regular rhythm.      Heart sounds: No murmur heard.  Pulmonary:      Effort: Pulmonary effort is normal.      Breath sounds: Normal breath sounds. No wheezing, rhonchi or rales.   Abdominal:      Palpations: There is no mass.      Comments: Abdomen is firm.  Mild distention noted.  No notable tenderness.   Musculoskeletal:         General: No swelling. Normal range of motion.      Cervical back: Normal range of motion.   Skin:     General: Skin is warm and dry.   Neurological:      Mental Status: He is alert. Mental status is at baseline. He is disoriented.      Comments: Patient awake, occasionally  mumbles incoherently.  Does not follow commands.  At baseline per nursing home staff.         Vital Signs  ED Triage Vitals   Temperature Pulse Respirations Blood Pressure SpO2   08/17/24 0805 08/17/24 0758 08/17/24 0758 08/17/24 0758 08/17/24 0758   98.7 °F (37.1 °C) 90 16 127/83 93 %      Temp Source Heart Rate Source Patient Position - Orthostatic VS BP Location FiO2 (%)   08/17/24 0805 -- -- -- --   Oral          Pain Score       --                  Vitals:    08/17/24 0758   BP: 127/83   Pulse: 90         Visual Acuity      ED Medications  Medications   lactated ringers bolus 1,000 mL ( Intravenous Restarted 8/17/24 1008)   cefTRIAXone (ROCEPHIN) IVPB (premix in dextrose) 1,000 mg 50 mL (0 mg Intravenous Stopped 8/17/24 0948)   acetaminophen (Ofirmev) injection 1,000 mg (0 mg Intravenous Stopped 8/17/24 1008)       Diagnostic Studies  Results Reviewed       Procedure Component Value Units Date/Time    CK [[id number]]  (Normal) Collected: 08/17/24 0808    Lab Status: Final result Specimen: Blood from Arm, Left Updated: 08/17/24 0954     Total CK 62 U/L     Urine Microscopic [[id number]]  (Abnormal) Collected: 08/17/24 0900    Lab Status: Final result Specimen: Urine, Straight Cath Updated: 08/17/24 0928     RBC, UA 0-1 /hpf      WBC, UA 0-5 /hpf      Epithelial Cells Occasional /hpf      Bacteria, UA Occasional /hpf      MUCUS THREADS Occasional    UA w Reflex to Microscopic w Reflex to Culture [[id number]]  (Abnormal) Collected: 08/17/24 0900    Lab Status: Final result Specimen: Urine, Straight Cath Updated: 08/17/24 0912     Color, UA Shanae     Clarity, UA Slightly Cloudy     Specific Gravity, UA 1.020     pH, UA 6.0     Leukocytes, UA Negative     Nitrite, UA Positive     Protein, UA Trace mg/dl      Glucose, UA Negative mg/dl      Ketones, UA Negative mg/dl      Urobilinogen, UA 1.0 E.U./dl      Bilirubin, UA 2+     Occult Blood, UA Negative    Comprehensive metabolic panel [[id number]]  (Abnormal) Collected:  08/17/24 0808    Lab Status: Final result Specimen: Blood from Arm, Left Updated: 08/17/24 0834     Sodium 150 mmol/L      Potassium 3.4 mmol/L      Chloride 110 mmol/L      CO2 25 mmol/L      ANION GAP 15 mmol/L      BUN 56 mg/dL      Creatinine 3.50 mg/dL      Glucose 157 mg/dL      Calcium 8.8 mg/dL      AST 18 U/L      ALT 13 U/L      Alkaline Phosphatase 94 U/L      Total Protein 7.2 g/dL      Albumin 3.7 g/dL      Total Bilirubin 1.27 mg/dL      eGFR 16 ml/min/1.73sq m     Narrative:      National Kid[employer] Foundation guidelines for Chronic Kidney Disease (CKD):     Stage 1 with normal or high GFR (GFR > 90 mL/min/1.73 square meters)    Stage 2 Mild CKD (GFR = 60-89 mL/min/1.73 square meters)    Stage 3A Moderate CKD (GFR = 45-59 mL/min/1.73 square meters)    Stage 3B Moderate CKD (GFR = 30-44 mL/min/1.73 square meters)    Stage 4 Severe CKD (GFR = 15-29 mL/min/1.73 square meters)    Stage 5 End Stage CKD (GFR <15 mL/min/1.73 square meters)  Note: GFR calculation is accurate only with a steady state creatinine    Lipase [[id number]]  (Normal) Collected: 08/17/24 0808    Lab Status: Final result Specimen: Blood from Arm, Left Updated: 08/17/24 0834     Lipase 40 u/L     CBC and differential [[id number]]  (Abnormal) Collected: 08/17/24 0808    Lab Status: Final result Specimen: Blood from Arm, Left Updated: 08/17/24 0813     WBC 15.07 Thousand/uL      RBC 4.13 Million/uL      Hemoglobin 13.2 g/dL      Hematocrit 40.5 %      MCV 98 fL      MCH 32.0 pg      MCHC 32.6 g/dL      RDW 13.1 %      MPV 10.5 fL      Platelets 317 Thousands/uL      nRBC 0 /100 WBCs      Segmented % 83 %      Immature Grans % 1 %      Lymphocytes % 6 %      Monocytes % 10 %      Eosinophils Relative 0 %      Basophils Relative 0 %      Absolute Neutrophils 12.61 Thousands/µL      Absolute Immature Grans 0.07 Thousand/uL      Absolute Lymphocytes 0.93 Thousands/µL      Absolute Monocytes 1.44 Thousand/µL      Eosinophils Absolute 0.00  Thousand/µL      Basophils Absolute 0.02 Thousands/µL                    CT abdomen pelvis wo contrast   Final Result by Jessie Pickering MD (08/17 0956)      Evidence of severe constipation, probable rectosigmoid impaction. Secondary evidence of stercoral colitis.      Distended colon results in moderate bilateral hydronephrosis.      Evidence of inflammation in the left groin. Masslike opacity which may be a hematoma. Question findings of left common femoral vein thrombus, which could be secondary to compression or inflammation from distended colon. Correlate clinically with direct    inspection. Initial imaging can be performed with ultrasound, MRI if needed.                  Workstation performed: [id number]                    Procedures  Procedures         ED Course  ED Course as of 08/17/24 1031   Sat Aug 17, 2024   0808 Per nursing home staff, KUB few days ago showing impaction. Placed on dulcolax suppositories. No bowel movement x 3 days.    0812 Procedure Note: EKG  Date/Time: 08/17/24 8:04 AM   Interpreted by: Dixie Holliday  Indications / Diagnosis: constipation  ECG reviewed by me, the ED Provider: yes   The EKG demonstrates:  Rhythm: rate 95, normal sinus  Intervals: normal intervals  Axis: normal axis  QRS/Blocks: normal QRS  ST Changes: No acute ST Changes, no STD/CHAZ.    0900 LOWELL noted on patient. Advised nursing to place eller instead of straight cath for urine sample as patient is possibly retaining. Eller placed by nursing and noted to have output of 2.3L of cola colored urine. Patient also now asking for water.   1014 Updated patient's daughter in law Lydia. Aware of admission.                              Initial Sepsis Screening       Row Name 08/17/24 0921                Is the patient's history suggestive of a new or worsening infection? Yes (Proceed)  -NA        Suspected source of infection urinary tract infection  -NA        Indicate SIRS criteria Leukocytosis (WBC > 48500 IJL) OR Leukopenia  "(WBC <4000 IJL) OR Bandemia (WBC >10% bands)  -NA        Are two or more of the above signs & symptoms of infection both present and new to the patient? No  -NA        Assess for evidence of organ dysfunction: Are any of the below criteria present within 6 hours of suspected infection and SIRS criteria that are NOT considered to be chronic conditions? --  -NA        Date of presentation of severe sepsis --        Time of presentation of severe sepsis --        Sepsis Note: Click \"NEXT\" below (NOT \"close\") to generate sepsis note based on above information. --                  User Key  (r) = Recorded By, (t) = Taken By, (c) = Cosigned By      Initials Name Provider Type    NA Dixie Holliday MD Physician                                  Medical Decision Making  73-year-old male sent to the ED from nursing facility for evaluation of constipation.  Patient noted to have a firm abdomen on examination but no overt tenderness noted.  Patient at baseline mental status.  Vital signs stable.  Differential diagnoses include but not limited to constipation, stercoral colitis, obstruction, perforation, colitis, UTI, dehydration, LOWELL, electrolyte abnormality.  Labs notable for LOWELL along with mild hyponatremia.  1 L of lactated Ringer's administered.  Lugo catheter was placed given LOWELL and suspicion of urinary retention with 2.3 L of output initially.  UA was significant for nitrate positive, will treat for UTI with Rocephin.  CT showing significant constipation and an element of stercoral colitis.  Incidentally noted left groin mass noted to be possibly a hematoma.  On clinical examination, there is no palpable mass or hematoma in this area.  Also incidentally noted possible left femoral vein thrombus.  The left lower extremity is neurovascularly intact.  Discussed with Slim admitted to their service for further evaluation and management.    Problems Addressed:  LOWELL (acute kidney injury) (HCC): acute illness or " injury  Constipation: acute illness or injury  Hypernatremia: acute illness or injury  Stercoral colitis: acute illness or injury  Urinary retention: acute illness or injury  UTI (urinary tract infection): acute illness or injury    Amount and/or Complexity of Data Reviewed  Labs: ordered.  Radiology: ordered.    Risk  Prescription drug management.  Decision regarding hospitalization.                 Disposition  Final diagnoses:   Urinary retention   LOWELL (acute kidney injury) ([facility])   Hypernatremia   UTI (urinary tract infection)   Constipation   Stercoral colitis     Time reflects when diagnosis was documented in both MDM as applicable and the Disposition within this note       Time User Action Codes Description Comment    8/17/2024 10:23 AM MgDixie iniguez Add [R33.9] Urinary retention     8/17/2024 10:23 AM MgDixie iniguez Add [N17.9] LOWELL (acute kidney injury) ([facility])     8/17/2024 10:23 AM MgDixie iniguez Add [E87.0] Hypernatremia     8/17/2024 10:23 AM MgDixie iniguez Add [N39.0] UTI (urinary tract infection)     8/17/2024 10:23 AM MgDixie iniguez Add [K59.00] Constipation     8/17/2024 10:23 AM MgDixie iniguez Add [K52.89] Stercoral colitis     8/17/2024 10:23 AM MgDixie iniguez Modify [R33.9] Urinary retention     8/17/2024 10:23 AM MgDixie iniguez Modify [N17.9] LOEWLL (acute kidney injury) ([facility])           ED Disposition       ED Disposition   Admit    Condition   Stable    Date/Time   Sat Aug 17, 2024 10:23 AM    Comment   Case was discussed with MIKEY and the patient's admission status was agreed to be Admission Status: inpatient status to the service of Dr. Savage .               Follow-up Information    None         Patient's Medications   Discharge Prescriptions    No medications on file       No discharge procedures on file.    PDMP Review         Value Time User    PDMP Reviewed  Yes 7/1/2023  8:04 PM Kenny Cevallos MD            ED Provider  Electronically Signed by             Dixie Holliday MD  08/17/24  [id number]

## 2024-08-17 NOTE — ASSESSMENT & PLAN NOTE
Continue meds for behavioral disturbances  Please note I tried to contact patient's son, daughter-in-law and significant other without success.  Message left

## 2024-08-17 NOTE — H&P
[facility]  H&P  Name: Jaspal Brownlee 73 y.o. male I MRN: [id number]  Unit/Bed#: -01 I Date of Admission: 8/17/2024   Date of Service: 8/17/2024 I Hospital Day: 0      Assessment & Plan   * LOWELL (acute kidney injury) (HCC)  Assessment & Plan  Suspect multifactorial secondary to dehydration and urinary retention  Lugo catheter in place  Maintain for now  IV fluid as below    Dehydration  Assessment & Plan  As evidenced by serum sodium of 150  Patient with approximately 3 L water deficit  Will start D5W 50 cc an hour repeat BMP later today    Unilateral edema of lower extremity  Assessment & Plan  Patient with left leg swelling and concern for left femoral DVT  Venous duplex  Patient with subdural hemorrhage on July 1  Patient now greater than 6 weeks status post that event may need full dose anticoagulation will discuss with neurosurgery    Slow transit constipation  Assessment & Plan  Start bowel regimen  MiraLAX twice daily with senna 2 tabs twice daily  IV hydration    Urinary retention  Assessment & Plan  Most likely aggravated by constipation  Lugo catheter for now  Bowel regimen    Dementia in other diseases classified elsewhere, unspecified severity, with mood disturbance (HCC)  Assessment & Plan  Continue meds for behavioral disturbances  Please note I tried to contact patient's son, daughter-in-law and significant other without success.  Message left           VTE Pharmacologic Prophylaxis: VTE Score: 6  awaiting CT of the head to start heparin drip for femoral DVT  Code Status: Level 1 - Full Code unable to discuss with family  Discussion with family: Attempted to update  (son) via phone. Left voicemail.     Anticipated Length of Stay: Patient will be admitted on an inpatient basis with an anticipated length of stay of greater than 2 midnights secondary to dehydration, LOWELL and constipation.    Total Time Spent on Date of Encounter in care of patient:  mins.  This time was spent on one or more of the following: performing physical exam; counseling and coordination of care; obtaining or reviewing history; documenting in the medical record; reviewing/ordering tests, medications or procedures; communicating with other healthcare professionals and discussing with patient's family/caregivers.    Chief Complaint: Constipation    History of Present Illness:  Jaspal Brownlee is a 73 y.o. male with a PMH of dementia with behavioral disturbances, subdural hemorrhage last month and hypertension who presents with constipation.  Patient was at skilled facility KUB performed 3 days ago showed impaction.  Baseline mental status per nursing home is responsive to painful stimuli.    Review of Systems:  Review of Systems   Unable to perform ROS: Dementia (Baseline dementia and recent subdural hemorrhage)       Past Medical and Surgical History:   Past Medical History:   Diagnosis Date    Disease of thyroid gland     Hyperlipidemia     Hypertension        Past Surgical History:   Procedure Laterality Date    HERNIA REPAIR      REPLACEMENT TOTAL KNEE BILATERAL         Meds/Allergies:  Prior to Admission medications    Medication Sig Start Date End Date Taking? Authorizing Provider   acetaminophen (TYLENOL) 650 mg CR tablet Take 650 mg by mouth 4 (four) times a day    Historical Provider, MD   cyanocobalamin (VITAMIN B-12) 1000 MCG tablet Take 1 tablet (1,000 mcg total) by mouth daily Do not start before June 30, 2023. 6/30/23   Misbah Fernandez PA-C   levETIRAcetam (Keppra) 500 mg tablet Take 1 tablet (500 mg total) by mouth every 12 (twelve) hours for 6 days 7/2/24 7/8/24  Mark Valadez MD   levothyroxine 125 mcg tablet Take 125 mcg by mouth daily    Historical Provider, MD   LORazepam (ATIVAN) 0.5 mg tablet Take 0.5 mg by mouth every 8 (eight) hours    Historical Provider, MD   pravastatin (PRAVACHOL) 40 mg tablet Take 40 mg by mouth daily    Historical Provider, MD   QUEtiapine  "(SEROquel) 100 mg tablet Take 100 mg by mouth 2 (two) times a day Takes with 50mg tabs to total 150mg BID    Historical Provider, MD   QUEtiapine (SEROquel) 50 mg tablet Take 50 mg by mouth 2 (two) times a day TAKES WITH 100mg tab to total 150mg BID    Historical Provider, MD   senna-docusate sodium (SENOKOT-S) 8.6-50 mg per tablet Take 1 tablet by mouth daily    Historical Provider, MD   sertraline (ZOLOFT) 50 mg tablet Take 50 mg by mouth daily    Historical Provider, MD     I have reveiwed home medications using records provided by [facility].    Allergies: No Known Allergies    Social History:  Marital Status:    Occupation:   Patient Pre-hospital Living Situation: Skilled Nursing Facility:    Patient Pre-hospital Level of Mobility: non-ambulatory/bed bound  Patient Pre-hospital Diet Restrictions:   Substance Use History:   Social History     Substance and Sexual Activity   Alcohol Use Not Currently    Comment: no drinks in at least 6 months     Social History     Tobacco Use   Smoking Status Former    Current packs/day: 0.00    Types: Cigarettes    Quit date: 10/23/2009    Years since quittin.8   Smokeless Tobacco Never     Social History     Substance and Sexual Activity   Drug Use Never       Family History:  Family History   Family history unknown: Yes       Physical Exam:     Vitals:   Blood Pressure: 127/83 (24 0758)  Pulse: 90 (24 0758)  Temperature: 98.7 °F (37.1 °C) (24 0805)  Temp Source: Oral (24 0805)  Respirations: 16 (24 0758)  Height: 5' 9\" (175.3 cm) (24 1114)  Weight - Scale: 82 kg (180 lb 12.4 oz) (24 1114)  SpO2: 93 % (24 0758)    Physical Exam  Constitutional:       General: He is not in acute distress.     Appearance: He is not toxic-appearing or diaphoretic.   Eyes:      General: No scleral icterus.  Cardiovascular:      Rate and Rhythm: Normal rate and regular rhythm.      Pulses: Normal pulses.      Heart sounds: Normal heart sounds. "   Pulmonary:      Effort: Pulmonary effort is normal. No respiratory distress.      Breath sounds: No wheezing, rhonchi or rales.   Abdominal:      General: Abdomen is flat. There is no distension.      Tenderness: There is no abdominal tenderness. There is no guarding or rebound.   Musculoskeletal:      Left lower leg: Edema (2+ left lower extremity JAN with dusky appearance consistent with venous congestion) present.   Skin:     Coloration: Skin is not jaundiced.      Findings: No rash.   Neurological:      Mental Status: Mental status is at baseline.          Additional Data:     Lab Results:  Results from last 7 days   Lab Units 08/17/24  0808   WBC Thousand/uL 15.07*   HEMOGLOBIN g/dL 13.2   HEMATOCRIT % 40.5   PLATELETS Thousands/uL 317   SEGS PCT % 83*   LYMPHO PCT % 6*   MONO PCT % 10   EOS PCT % 0     Results from last 7 days   Lab Units 08/17/24  0808   SODIUM mmol/L 150*   POTASSIUM mmol/L 3.4*   CHLORIDE mmol/L 110*   CO2 mmol/L 25   BUN mg/dL 56*   CREATININE mg/dL 3.50*   ANION GAP mmol/L 15*   CALCIUM mg/dL 8.8   ALBUMIN g/dL 3.7   TOTAL BILIRUBIN mg/dL 1.27*   ALK PHOS U/L 94   ALT U/L 13   AST U/L 18   GLUCOSE RANDOM mg/dL 157*             Lab Results   Component Value Date    HGBA1C 5.5 07/13/2023           Lines/Drains:  Invasive Devices       Peripheral Intravenous Line  Duration             Peripheral IV 08/17/24 Left Arm <1 day              Drain  Duration             Urethral Catheter Non-latex 16 Fr. <1 day                  Urinary Catheter:  Goal for removal: Voiding trial when ambulation improves             Imaging: Reviewed radiology reports from this admission including: abdominal/pelvic CT  CT abdomen pelvis wo contrast   Final Result by Jessie Pickering MD (08/17 0956)      Evidence of severe constipation, probable rectosigmoid impaction. Secondary evidence of stercoral colitis.      Distended colon results in moderate bilateral hydronephrosis.      Evidence of inflammation in the left  groin. Masslike opacity which may be a hematoma. Question findings of left common femoral vein thrombus, which could be secondary to compression or inflammation from distended colon. Correlate clinically with direct    inspection. Initial imaging can be performed with ultrasound, MRI if needed.                  Workstation performed: [id number]          VAS VENOUS DUPLEX - LOWER LIMB BILATERAL    (Results Pending)   CT head wo contrast    (Results Pending)       EKG and Other Studies Reviewed on Admission:   EKG:  Normal sinus rhythm no acute changes my read.    ** Please Note: This note has been constructed using a voice recognition system. **

## 2024-08-18 PROBLEM — N30.01 ACUTE CYSTITIS WITH HEMATURIA: Status: ACTIVE | Noted: 2024-08-18

## 2024-08-18 PROBLEM — I82.409 DVT (DEEP VENOUS THROMBOSIS) (HCC): Status: ACTIVE | Noted: 2024-08-17

## 2024-08-18 PROBLEM — R93.5 ABNORMAL ABDOMINAL CT SCAN: Status: ACTIVE | Noted: 2024-08-18

## 2024-08-18 LAB
ANION GAP SERPL CALCULATED.3IONS-SCNC: 11 MMOL/L (ref 4–13)
ANION GAP SERPL CALCULATED.3IONS-SCNC: 12 MMOL/L (ref 4–13)
APTT PPP: 36 SECONDS (ref 23–34)
APTT PPP: 37 SECONDS (ref 23–34)
APTT PPP: 74 SECONDS (ref 23–34)
BUN SERPL-MCNC: 45 MG/DL (ref 5–25)
BUN SERPL-MCNC: 46 MG/DL (ref 5–25)
CALCIUM SERPL-MCNC: 8.3 MG/DL (ref 8.4–10.2)
CALCIUM SERPL-MCNC: 8.4 MG/DL (ref 8.4–10.2)
CHLORIDE SERPL-SCNC: 109 MMOL/L (ref 96–108)
CHLORIDE SERPL-SCNC: 110 MMOL/L (ref 96–108)
CO2 SERPL-SCNC: 27 MMOL/L (ref 21–32)
CO2 SERPL-SCNC: 27 MMOL/L (ref 21–32)
CREAT SERPL-MCNC: 1.7 MG/DL (ref 0.6–1.3)
CREAT SERPL-MCNC: 1.86 MG/DL (ref 0.6–1.3)
ERYTHROCYTE [DISTWIDTH] IN BLOOD BY AUTOMATED COUNT: 12.9 % (ref 11.6–15.1)
GFR SERPL CREATININE-BSD FRML MDRD: 34 ML/MIN/1.73SQ M
GFR SERPL CREATININE-BSD FRML MDRD: 38 ML/MIN/1.73SQ M
GLUCOSE SERPL-MCNC: 133 MG/DL (ref 65–140)
GLUCOSE SERPL-MCNC: 170 MG/DL (ref 65–140)
HCT VFR BLD AUTO: 37.8 % (ref 36.5–49.3)
HGB BLD-MCNC: 12.1 G/DL (ref 12–17)
MCH RBC QN AUTO: 32 PG (ref 26.8–34.3)
MCHC RBC AUTO-ENTMCNC: 32 G/DL (ref 31.4–37.4)
MCV RBC AUTO: 100 FL (ref 82–98)
PLATELET # BLD AUTO: 279 THOUSANDS/UL (ref 149–390)
PMV BLD AUTO: 11.2 FL (ref 8.9–12.7)
POTASSIUM SERPL-SCNC: 3.1 MMOL/L (ref 3.5–5.3)
POTASSIUM SERPL-SCNC: 3.8 MMOL/L (ref 3.5–5.3)
RBC # BLD AUTO: 3.78 MILLION/UL (ref 3.88–5.62)
SODIUM SERPL-SCNC: 147 MMOL/L (ref 135–147)
SODIUM SERPL-SCNC: 149 MMOL/L (ref 135–147)
WBC # BLD AUTO: 12.3 THOUSAND/UL (ref 4.31–10.16)

## 2024-08-18 PROCEDURE — 80048 BASIC METABOLIC PNL TOTAL CA: CPT | Performed by: INTERNAL MEDICINE

## 2024-08-18 PROCEDURE — 99232 SBSQ HOSP IP/OBS MODERATE 35: CPT | Performed by: INTERNAL MEDICINE

## 2024-08-18 PROCEDURE — 85027 COMPLETE CBC AUTOMATED: CPT | Performed by: INTERNAL MEDICINE

## 2024-08-18 PROCEDURE — 85730 THROMBOPLASTIN TIME PARTIAL: CPT | Performed by: INTERNAL MEDICINE

## 2024-08-18 RX ORDER — LEVOTHYROXINE SODIUM 125 UG/1
125 TABLET ORAL DAILY
Status: DISCONTINUED | OUTPATIENT
Start: 2024-08-18 | End: 2024-08-20

## 2024-08-18 RX ORDER — DEXTROSE MONOHYDRATE AND SODIUM CHLORIDE 5; .45 G/100ML; G/100ML
75 INJECTION, SOLUTION INTRAVENOUS CONTINUOUS
Status: DISCONTINUED | OUTPATIENT
Start: 2024-08-18 | End: 2024-08-20

## 2024-08-18 RX ORDER — LACTULOSE 10 G/15ML
30 SOLUTION ORAL 4 TIMES DAILY
Status: DISCONTINUED | OUTPATIENT
Start: 2024-08-18 | End: 2024-08-19

## 2024-08-18 RX ADMIN — SENNOSIDES 17.2 MG: 8.6 TABLET, FILM COATED ORAL at 18:01

## 2024-08-18 RX ADMIN — LACTULOSE 30 G: 20 SOLUTION ORAL at 11:51

## 2024-08-18 RX ADMIN — CYANOCOBALAMIN TAB 500 MCG 1000 MCG: 500 TAB at 09:22

## 2024-08-18 RX ADMIN — LEVOTHYROXINE SODIUM 125 MCG: 125 TABLET ORAL at 09:21

## 2024-08-18 RX ADMIN — POLYETHYLENE GLYCOL 3350 17 G: 17 POWDER, FOR SOLUTION ORAL at 09:20

## 2024-08-18 RX ADMIN — LACTULOSE 30 G: 20 SOLUTION ORAL at 18:01

## 2024-08-18 RX ADMIN — POLYETHYLENE GLYCOL 3350 17 G: 17 POWDER, FOR SOLUTION ORAL at 18:01

## 2024-08-18 RX ADMIN — NYSTATIN: 100000 POWDER TOPICAL at 09:22

## 2024-08-18 RX ADMIN — CEFTRIAXONE 1000 MG: 1 INJECTION, SOLUTION INTRAVENOUS at 09:22

## 2024-08-18 RX ADMIN — LORAZEPAM 0.5 MG: 0.5 TABLET ORAL at 21:41

## 2024-08-18 RX ADMIN — DEXTROSE 75 ML/HR: 5 SOLUTION INTRAVENOUS at 11:50

## 2024-08-18 RX ADMIN — LACTULOSE 30 G: 20 SOLUTION ORAL at 21:41

## 2024-08-18 RX ADMIN — QUETIAPINE FUMARATE 50 MG: 25 TABLET ORAL at 18:01

## 2024-08-18 RX ADMIN — LORAZEPAM 0.5 MG: 0.5 TABLET ORAL at 07:01

## 2024-08-18 RX ADMIN — PRAVASTATIN SODIUM 40 MG: 40 TABLET ORAL at 09:22

## 2024-08-18 RX ADMIN — DEXTROSE AND SODIUM CHLORIDE 75 ML/HR: 5; .45 INJECTION, SOLUTION INTRAVENOUS at 16:19

## 2024-08-18 RX ADMIN — QUETIAPINE FUMARATE 100 MG: 100 TABLET ORAL at 09:22

## 2024-08-18 RX ADMIN — HEPARIN SODIUM 18 UNITS/KG/HR: 10000 INJECTION, SOLUTION INTRAVENOUS at 09:19

## 2024-08-18 RX ADMIN — QUETIAPINE FUMARATE 100 MG: 100 TABLET ORAL at 18:00

## 2024-08-18 RX ADMIN — QUETIAPINE FUMARATE 50 MG: 25 TABLET ORAL at 09:22

## 2024-08-18 RX ADMIN — SENNOSIDES 17.2 MG: 8.6 TABLET, FILM COATED ORAL at 09:22

## 2024-08-18 RX ADMIN — SERTRALINE HYDROCHLORIDE 50 MG: 50 TABLET ORAL at 09:22

## 2024-08-18 RX ADMIN — NYSTATIN: 100000 POWDER TOPICAL at 18:01

## 2024-08-18 RX ADMIN — HEPARIN SODIUM 26 UNITS/KG/HR: 10000 INJECTION, SOLUTION INTRAVENOUS at 22:46

## 2024-08-18 NOTE — PROGRESS NOTES
[facility]  Progress Note  Name: Jaspal Brownlee I  MRN: [id number]  Unit/Bed#: -01 I Date of Admission: 8/17/2024   Date of Service: 8/18/2024 I Hospital Day: 1    Assessment & Plan   LOWELL (acute kidney injury) ([facility])  Assessment & Plan  Suspect multifactorial secondary to dehydration and urinary retention  Renal function improving  Continue IV fluid D5W increased to 75 cc an hour check labs at noon today  Maintain Lugo catheter until constipation resolved    Dehydration  Assessment & Plan  As evidenced by serum sodium of 150  Patient with approximately 3 L water deficit  Sodium improving.  Continue IV fluid D5W 75 cc an hour check labs at noon today    DVT (deep venous thrombosis) ([facility])  Assessment & Plan  Suspicion for left femoral DVT on CT scan  Venous duplex pending  Continue IV heparin    Slow transit constipation  Assessment & Plan  Start bowel regimen  MiraLAX twice daily with senna 2 tabs twice daily  Case discussed with bedside nursing.  Will try soapsuds enema today.    Urinary retention  Assessment & Plan  Most likely aggravated by constipation  Lugo catheter for now until constipation resolved  Bowel regimen    * Dementia in other diseases classified elsewhere, unspecified severity, with mood disturbance ([facility])  Assessment & Plan  Continue meds for behavioral disturbances  Case discussed with patient's daughter-in-law who is power of  patient is a DNR level 3    Abnormal abdominal CT scan  Assessment & Plan  Masslike opacity anterior to the abductor muscles on CT scan  I find nothing significant on physical exam  Abdominal ultrasound pending    Benign essential HTN  Assessment & Plan  Patient carries a diagnosis of hypertension on no medications monitor vital signs    UTI present on admission continue IV Rocephin follow-up cultures         VTE Pharmacologic Prophylaxis: VTE Score: 6 High Risk (Score >/= 5) - Pharmacological DVT Prophylaxis Ordered: heparin.  "Sequential Compression Devices Ordered.    Mobility:   Basic Mobility Inpatient Raw Score: 8  JH-HLM Goal: 3: Sit at edge of bed  JH-HLM Achieved: 2: Bed activities/Dependent transfer  JH-HLM Goal achieved. Continue to encourage appropriate mobility.    Patient Centered Rounds: I performed bedside rounds with nursing staff today.       Education and Discussions with Family / Patient:  Case discussed with patient's daughter-in-law who is power of .     Total Time Spent on Date of Encounter in care of patient:  mins. This time was spent on one or more of the following: performing physical exam; counseling and coordination of care; obtaining or reviewing history; documenting in the medical record; reviewing/ordering tests, medications or procedures; communicating with other healthcare professionals and discussing with patient's family/caregivers.    Current Length of Stay: 1 day(s)  Current Patient Status: Inpatient   Certification Statement: The patient will continue to require additional inpatient hospital stay due to patient with dehydration and LOWELL requiring IV fluid and constipation requiring bowel regimen  Discharge Plan: Anticipate discharge in 24-48 hrs to rehab facility.    Code Status: Level 3 - DNAR and DNI    Subjective:   Patient much more alert for me today.  Comfortable in no acute distress.  When asked how he is doing he reports \"okay for single lynda\"    Objective:     Vitals:   Temp (24hrs), Av.1 °F (36.7 °C), Min:97.5 °F (36.4 °C), Max:99.1 °F (37.3 °C)    Temp:  [97.5 °F (36.4 °C)-99.1 °F (37.3 °C)] 99.1 °F (37.3 °C)  HR:  [75-89] 89  Resp:  [16-20] 16  BP: (103-135)/(66-86) 135/80  SpO2:  [90 %-96 %] 90 %  Body mass index is 26.7 kg/m².     Input and Output Summary (last 24 hours):     Intake/Output Summary (Last 24 hours) at 2024 0849  Last data filed at 2024 0640  Gross per 24 hour   Intake 1585.97 ml   Output 6000 ml   Net -4414.03 ml       Physical Exam:   Physical " Exam  Constitutional:       General: He is not in acute distress.     Appearance: He is not diaphoretic.   Eyes:      General: No scleral icterus.  Cardiovascular:      Rate and Rhythm: Normal rate and regular rhythm.      Pulses: Normal pulses.      Heart sounds: Normal heart sounds.   Pulmonary:      Effort: Pulmonary effort is normal. No respiratory distress.      Breath sounds: No stridor. No wheezing, rhonchi or rales.   Abdominal:      General: There is no distension.      Tenderness: There is no abdominal tenderness. There is no guarding or rebound.   Musculoskeletal:      Right lower leg: Edema present.   Neurological:      Mental Status: He is alert.          Additional Data:     Labs:  Results from last 7 days   Lab Units 08/18/24  0441 08/17/24  0808   WBC Thousand/uL 12.30* 15.07*   HEMOGLOBIN g/dL 12.1 13.2   HEMATOCRIT % 37.8 40.5   PLATELETS Thousands/uL 279 317   SEGS PCT %  --  83*   LYMPHO PCT %  --  6*   MONO PCT %  --  10   EOS PCT %  --  0     Results from last 7 days   Lab Units 08/18/24  0441 08/17/24  2121 08/17/24  0808   SODIUM mmol/L 149*   < > 150*   POTASSIUM mmol/L 3.1*   < > 3.4*   CHLORIDE mmol/L 110*   < > 110*   CO2 mmol/L 27   < > 25   BUN mg/dL 46*   < > 56*   CREATININE mg/dL 1.86*   < > 3.50*   ANION GAP mmol/L 12   < > 15*   CALCIUM mg/dL 8.3*   < > 8.8   ALBUMIN g/dL  --   --  3.7   TOTAL BILIRUBIN mg/dL  --   --  1.27*   ALK PHOS U/L  --   --  94   ALT U/L  --   --  13   AST U/L  --   --  18   GLUCOSE RANDOM mg/dL 133   < > 157*    < > = values in this interval not displayed.     Results from last 7 days   Lab Units 08/17/24  1453   INR  1.37*                   Lines/Drains:  Invasive Devices       Peripheral Intravenous Line  Duration             Peripheral IV 08/17/24 Left Arm 1 day    Peripheral IV 08/17/24 Distal;Dorsal (posterior);Right Forearm <1 day              Drain  Duration             Urethral Catheter Non-latex 16 Fr. <1 day                  Urinary  Catheter:  Goal for removal:  Maintain Lugo while patient constipated               Imaging: Reviewed radiology reports from this admission including: abdominal/pelvic CT    Recent Cultures (last 7 days):         Last 24 Hours Medication List:   Current Facility-Administered Medications   Medication Dose Route Frequency Provider Last Rate    acetaminophen  650 mg Oral Q6H PRN Shaquille Savage MD      cefTRIAXone  1,000 mg Intravenous Q24H Shaquille Savage MD      cyanocobalamin  1,000 mcg Oral Daily Shaquille Savage MD      dextrose  75 mL/hr Intravenous Continuous Shaquille Savage MD 75 mL/hr (08/18/24 0640)    heparin (porcine)  3-30 Units/kg/hr (Order-Specific) Intravenous Titrated Shaquille Savage MD 18 Units/kg/hr (08/18/24 0640)    levothyroxine  125 mcg Oral Daily Shaquille Savage MD      LORazepam  0.5 mg Oral Q8H Shaquille Savage MD      nystatin   Topical BID Shaquille Savage MD      ondansetron  4 mg Intravenous Q6H PRN Shaquille Savage MD      polyethylene glycol  17 g Oral BID Shaquille Savage MD      pravastatin  40 mg Oral Daily Shaquille Savage MD      QUEtiapine  100 mg Oral BID Shaquille Savage MD      QUEtiapine  50 mg Oral BID Shaquille Savage MD      senna  2 tablet Oral BID Shaquille Savage MD      sertraline  50 mg Oral Daily Shaquille Savage MD          Today, Patient Was Seen By: Shaquille Savage MD    **Please Note: This note may have been constructed using a voice recognition system.**

## 2024-08-18 NOTE — PLAN OF CARE
Problem: Prexisting or High Potential for Compromised Skin Integrity  Goal: Skin integrity is maintained or improved  Description: INTERVENTIONS:  - Identify patients at risk for skin breakdown  - Assess and monitor skin integrity  - Assess and monitor nutrition and hydration status  - Monitor labs   - Assess for incontinence   - Turn and reposition patient  - Assist with mobility/ambulation  - Relieve pressure over bony prominences  - Avoid friction and shearing  - Provide appropriate hygiene as needed including keeping skin clean and dry  - Evaluate need for skin moisturizer/barrier cream  - Collaborate with interdisciplinary team   - Patient/family teaching  - Consider wound care consult   8/18/2024 1031 by Brenda Schreiber RN  Outcome: Progressing  8/18/2024 1029 by Brenda Schreiber, RN  Outcome: Progressing

## 2024-08-18 NOTE — ASSESSMENT & PLAN NOTE
Suspect multifactorial secondary to dehydration and urinary retention  Renal function improving  Continue IV fluid D5W increased to 75 cc an hour check labs at noon today  Maintain Lugo catheter until constipation resolved

## 2024-08-18 NOTE — ASSESSMENT & PLAN NOTE
UTI present admission  Continue Rocephin day 3  Unfortunately there does not appear to be urine culture pending and we may need to treat empirically

## 2024-08-18 NOTE — ASSESSMENT & PLAN NOTE
Continue meds for behavioral disturbances  Case discussed with patient's daughter-in-law who is power of  patient is a DNR level 3

## 2024-08-18 NOTE — ASSESSMENT & PLAN NOTE
Start bowel regimen  MiraLAX twice daily with senna 2 tabs twice daily  Case discussed with bedside nursing.  Will try soapsuds enema today.

## 2024-08-18 NOTE — ASSESSMENT & PLAN NOTE
As evidenced by serum sodium of 150  Patient with approximately 3 L water deficit  Sodium improving.  Continue IV fluid D5W 75 cc an hour check labs at noon today

## 2024-08-18 NOTE — ASSESSMENT & PLAN NOTE
Most likely aggravated by constipation  Lugo catheter for now until constipation resolved  Bowel regimen

## 2024-08-19 ENCOUNTER — APPOINTMENT (INPATIENT)
Dept: VASCULAR ULTRASOUND | Facility: HOSPITAL | Age: 74
DRG: 682 | End: 2024-08-19
Payer: MEDICARE

## 2024-08-19 ENCOUNTER — APPOINTMENT (INPATIENT)
Dept: RADIOLOGY | Facility: HOSPITAL | Age: 74
DRG: 682 | End: 2024-08-19
Payer: MEDICARE

## 2024-08-19 LAB
ANION GAP SERPL CALCULATED.3IONS-SCNC: 15 MMOL/L (ref 4–13)
ANION GAP SERPL CALCULATED.3IONS-SCNC: 17 MMOL/L (ref 4–13)
APTT PPP: 140 SECONDS (ref 23–34)
APTT PPP: 169 SECONDS (ref 23–34)
APTT PPP: >210 SECONDS (ref 23–34)
ATRIAL RATE: 95 BPM
BUN SERPL-MCNC: 42 MG/DL (ref 5–25)
BUN SERPL-MCNC: 52 MG/DL (ref 5–25)
CALCIUM SERPL-MCNC: 7.6 MG/DL (ref 8.4–10.2)
CALCIUM SERPL-MCNC: 7.9 MG/DL (ref 8.4–10.2)
CHLORIDE SERPL-SCNC: 110 MMOL/L (ref 96–108)
CHLORIDE SERPL-SCNC: 111 MMOL/L (ref 96–108)
CO2 SERPL-SCNC: 19 MMOL/L (ref 21–32)
CO2 SERPL-SCNC: 22 MMOL/L (ref 21–32)
CREAT SERPL-MCNC: 1.47 MG/DL (ref 0.6–1.3)
CREAT SERPL-MCNC: 1.84 MG/DL (ref 0.6–1.3)
ERYTHROCYTE [DISTWIDTH] IN BLOOD BY AUTOMATED COUNT: 13.1 % (ref 11.6–15.1)
GFR SERPL CREATININE-BSD FRML MDRD: 34 ML/MIN/1.73SQ M
GFR SERPL CREATININE-BSD FRML MDRD: 45 ML/MIN/1.73SQ M
GLUCOSE SERPL-MCNC: 201 MG/DL (ref 65–140)
GLUCOSE SERPL-MCNC: 202 MG/DL (ref 65–140)
HCT VFR BLD AUTO: 41.4 % (ref 36.5–49.3)
HGB BLD-MCNC: 13.5 G/DL (ref 12–17)
MCH RBC QN AUTO: 32.1 PG (ref 26.8–34.3)
MCHC RBC AUTO-ENTMCNC: 32.6 G/DL (ref 31.4–37.4)
MCV RBC AUTO: 98 FL (ref 82–98)
MRSA NOSE QL CULT: NORMAL
P AXIS: 51 DEGREES
PLATELET # BLD AUTO: 358 THOUSANDS/UL (ref 149–390)
PMV BLD AUTO: 11.5 FL (ref 8.9–12.7)
POTASSIUM SERPL-SCNC: 3.3 MMOL/L (ref 3.5–5.3)
POTASSIUM SERPL-SCNC: 3.5 MMOL/L (ref 3.5–5.3)
PR INTERVAL: 180 MS
QRS AXIS: -13 DEGREES
QRSD INTERVAL: 80 MS
QT INTERVAL: 344 MS
QTC INTERVAL: 432 MS
RBC # BLD AUTO: 4.21 MILLION/UL (ref 3.88–5.62)
SODIUM SERPL-SCNC: 147 MMOL/L (ref 135–147)
SODIUM SERPL-SCNC: 147 MMOL/L (ref 135–147)
T WAVE AXIS: 26 DEGREES
VENTRICULAR RATE: 95 BPM
WBC # BLD AUTO: 17.98 THOUSAND/UL (ref 4.31–10.16)

## 2024-08-19 PROCEDURE — 93970 EXTREMITY STUDY: CPT

## 2024-08-19 PROCEDURE — 80048 BASIC METABOLIC PNL TOTAL CA: CPT | Performed by: INTERNAL MEDICINE

## 2024-08-19 PROCEDURE — 99222 1ST HOSP IP/OBS MODERATE 55: CPT | Performed by: SURGERY

## 2024-08-19 PROCEDURE — 93010 ELECTROCARDIOGRAM REPORT: CPT | Performed by: INTERNAL MEDICINE

## 2024-08-19 PROCEDURE — 93970 EXTREMITY STUDY: CPT | Performed by: SURGERY

## 2024-08-19 PROCEDURE — 74018 RADEX ABDOMEN 1 VIEW: CPT

## 2024-08-19 PROCEDURE — 85730 THROMBOPLASTIN TIME PARTIAL: CPT | Performed by: INTERNAL MEDICINE

## 2024-08-19 PROCEDURE — 99232 SBSQ HOSP IP/OBS MODERATE 35: CPT | Performed by: INTERNAL MEDICINE

## 2024-08-19 PROCEDURE — 85027 COMPLETE CBC AUTOMATED: CPT | Performed by: INTERNAL MEDICINE

## 2024-08-19 RX ORDER — ACETAMINOPHEN 10 MG/ML
1000 INJECTION, SOLUTION INTRAVENOUS EVERY 6 HOURS PRN
Status: DISCONTINUED | OUTPATIENT
Start: 2024-08-19 | End: 2024-08-19

## 2024-08-19 RX ORDER — SODIUM PHOSPHATE, DIBASIC AND SODIUM PHOSPHATE, MONOBASIC 3.5; 9.5 G/66ML; G/66ML
1 ENEMA RECTAL ONCE
Status: DISCONTINUED | OUTPATIENT
Start: 2024-08-19 | End: 2024-08-19

## 2024-08-19 RX ORDER — ACETAMINOPHEN 10 MG/ML
1000 INJECTION, SOLUTION INTRAVENOUS EVERY 6 HOURS PRN
Status: DISCONTINUED | OUTPATIENT
Start: 2024-08-19 | End: 2024-08-20 | Stop reason: HOSPADM

## 2024-08-19 RX ORDER — SODIUM PHOSPHATE,MONO-DIBASIC 19G-7G/118
1 ENEMA (ML) RECTAL ONCE
Status: COMPLETED | OUTPATIENT
Start: 2024-08-19 | End: 2024-08-19

## 2024-08-19 RX ORDER — POTASSIUM CHLORIDE 14.9 MG/ML
20 INJECTION INTRAVENOUS ONCE
Status: COMPLETED | OUTPATIENT
Start: 2024-08-19 | End: 2024-08-20

## 2024-08-19 RX ORDER — BISACODYL 10 MG
10 SUPPOSITORY, RECTAL RECTAL DAILY
Status: DISCONTINUED | OUTPATIENT
Start: 2024-08-19 | End: 2024-08-20 | Stop reason: HOSPADM

## 2024-08-19 RX ORDER — LORAZEPAM 2 MG/ML
0.5 INJECTION INTRAMUSCULAR EVERY 8 HOURS SCHEDULED
Status: DISCONTINUED | OUTPATIENT
Start: 2024-08-19 | End: 2024-08-19

## 2024-08-19 RX ORDER — LORAZEPAM 2 MG/ML
0.5 INJECTION INTRAMUSCULAR EVERY 8 HOURS PRN
Status: DISCONTINUED | OUTPATIENT
Start: 2024-08-19 | End: 2024-08-20

## 2024-08-19 RX ADMIN — SODIUM PHOSPHATE, DIBASIC AND SODIUM PHOSPHATE, MONOBASIC 1 ENEMA: 7; 19 ENEMA RECTAL at 17:49

## 2024-08-19 RX ADMIN — HEPARIN SODIUM 20 UNITS/KG/HR: 10000 INJECTION, SOLUTION INTRAVENOUS at 16:24

## 2024-08-19 RX ADMIN — NYSTATIN: 100000 POWDER TOPICAL at 18:14

## 2024-08-19 RX ADMIN — LEVOTHYROXINE SODIUM 125 MCG: 125 TABLET ORAL at 05:35

## 2024-08-19 RX ADMIN — BISACODYL 10 MG: 10 SUPPOSITORY RECTAL at 15:22

## 2024-08-19 RX ADMIN — LORAZEPAM 0.5 MG: 2 INJECTION INTRAMUSCULAR; INTRAVENOUS at 15:22

## 2024-08-19 RX ADMIN — DEXTROSE AND SODIUM CHLORIDE 75 ML/HR: 5; .45 INJECTION, SOLUTION INTRAVENOUS at 19:30

## 2024-08-19 RX ADMIN — LORAZEPAM 0.5 MG: 0.5 TABLET ORAL at 05:35

## 2024-08-19 RX ADMIN — POTASSIUM CHLORIDE 20 MEQ: 14.9 INJECTION, SOLUTION INTRAVENOUS at 10:29

## 2024-08-19 RX ADMIN — DEXTROSE AND SODIUM CHLORIDE 75 ML/HR: 5; .45 INJECTION, SOLUTION INTRAVENOUS at 05:34

## 2024-08-19 RX ADMIN — ONDANSETRON 4 MG: 2 INJECTION INTRAMUSCULAR; INTRAVENOUS at 15:22

## 2024-08-19 RX ADMIN — CEFTRIAXONE 1000 MG: 1 INJECTION, SOLUTION INTRAVENOUS at 10:29

## 2024-08-19 RX ADMIN — NYSTATIN: 100000 POWDER TOPICAL at 10:30

## 2024-08-19 NOTE — ASSESSMENT & PLAN NOTE
He now has an increasing white blood cell count and per nursing he had some vomiting yesterday while being administered soapsuds enema.  I do think we will get a surgical assessment and abdominal flatplate to evaluate for obstruction  Pain control  Will limit anything by mouth until evaluation above

## 2024-08-19 NOTE — CONSULTS
Consultation - General Surgery  Jaspal Brownlee 74 y.o. male MRN: [id number]  Unit/Bed#: -01 Encounter: [id number]    Reason for Consult: stercoral colitis      Assessment:  75 y/o male presents from nursing home with constipation, last BM 5 days ago per facility. PMH dementia, subdural hemorrhage last month s/p fall forward from wheelchair, HTN, HLD, wheelchair bound. General surgery consulted d/t increasing WBC and two episodes of vomiting last night.  Pt vomited last night when being positioned for enema  Enema was unsuccessful  CT 8/17 with fecal impaction, moderate b/l hydronephrosis, masslike opacity anterior to adductor muscles (7.7 x 3.1 x 4.3 cm), and questionable L common femoral vein thrombosis  UA c/w UTI  No BM yet despite Miralax BID, Senna BID, and soap suds enema  Per chart review, manual disimpaction attempted on 8/17 by Shaquille Savage MD    Afebrile, tachycardia noted this morning in 90s  WBC 17.98 (12.30)  Hgb 13.5 (12.1)  K 3.3 (3.8)  Lugo placed for urinary retention  Abdominal US pending for evaluation of abdominal wall opacity    Chronic constipation/obstipation  Abdominal wall opacity possibly hematoma 2/2 fall?    Plan:   No acute surgical intervention at this time  NPO, IVF  F/U KUB  Monitor for return of bowel function   Consider placement of NGT if nausea/vomiting returns  Manual disimpaction may be of benefit  Aggressive bowel regimen followed by appropriate regimen at home to prevent recurrence, Consider GI consult for recommendations    Electrolyte repletion  Serial abdominal exams  Continue Rocephin for UTI, monitor kidney function  Pt is level 3 code status, daughter-in-law (Lydia) POA  Remainder of medical management per primary team       HPI:    Jaspal Brownlee is a 74 y.o. male from nursing home with constipation, last BM 5 days ago per facility. PMH dementia, subdural hemorrhage last month s/p fall forward from wheelchair, HTN, HLD, wheelchair bound. General surgery  consulted d/t increasing WBC and two episodes of vomiting last night.  Upon entering room, pt is seen resting in bed. Patient offers no verbal response to questioning, grimaces to pain on palpation.     Per chart review, surgical history includes b/l TKA and hernia repair.     Review of Systems:  Review of Systems   Unable to perform ROS: Dementia        Historical Information   Past Medical History:   Diagnosis Date    Disease of thyroid gland     Hyperlipidemia     Hypertension      Past Surgical History:   Procedure Laterality Date    HERNIA REPAIR      REPLACEMENT TOTAL KNEE BILATERAL       Social History   Social History     Substance and Sexual Activity   Alcohol Use Not Currently    Comment: no drinks in at least 6 months     Social History     Substance and Sexual Activity   Drug Use Never     Social History     Tobacco Use   Smoking Status Former    Current packs/day: 0.00    Types: Cigarettes    Quit date: 10/23/2009    Years since quittin.8   Smokeless Tobacco Never     Family History   Family history unknown: Yes       Medications:  Current Facility-Administered Medications   Medication Dose Route Frequency    acetaminophen (TYLENOL) tablet 650 mg  650 mg Oral Q6H PRN    cefTRIAXone (ROCEPHIN) IVPB (premix in dextrose) 1,000 mg 50 mL  1,000 mg Intravenous Q24H    dextrose 5 % and sodium chloride 0.45 % infusion  75 mL/hr Intravenous Continuous    heparin (porcine) 25,000 units in 0.45% NaCl 250 mL infusion (premix)  3-30 Units/kg/hr (Order-Specific) Intravenous Titrated    levothyroxine tablet 125 mcg  125 mcg Oral Daily    LORazepam (ATIVAN) injection 0.5 mg  0.5 mg Intravenous Q8H YARI    morphine injection 2 mg  2 mg Intravenous Q4H PRN    nystatin (MYCOSTATIN) powder   Topical BID    ondansetron (ZOFRAN) injection 4 mg  4 mg Intravenous Q6H PRN    potassium chloride 20 mEq IVPB (premix)  20 mEq Intravenous Once       No Known Allergies    Physical Examination:  Vitals:   Vitals:    24 0747  08/18/24 1536 08/18/24 2300 08/19/24 0714   BP: 135/80 141/77 94/61 111/93   BP Location: Left arm Left arm Right arm Left arm   Pulse: 89 72 95 92   Resp: 16 16 18 20   Temp: 99.1 °F (37.3 °C) (!) 96.8 °F (36 °C) (!) 96.8 °F (36 °C) 98.4 °F (36.9 °C)   TempSrc: Tympanic Tympanic Tympanic Tympanic   SpO2: 90% 91% 94% 97%   Weight:       Height:         Temp  Min: 96.8 °F (36 °C)  Max: 99.1 °F (37.3 °C)  IBW (Ideal Body Weight): 70.7 kg    Physical Exam  Vitals reviewed.   Constitutional:       General: He is not in acute distress.     Appearance: He is not diaphoretic.   Cardiovascular:      Rate and Rhythm: Normal rate.   Pulmonary:      Effort: No respiratory distress.   Abdominal:      General: There is distension.      Palpations: Abdomen is soft.      Tenderness: There is abdominal tenderness (grimaces to palpation throughout abdomen). There is no guarding or rebound.      Comments: Absent bowel sounds   Skin:     General: Skin is warm and dry.   Neurological:      Mental Status: He is disoriented.          Diagnostic Data:  Lab: I have personally reviewed pertinent lab results.  Results from last 7 days   Lab Units 08/19/24  0457   WBC Thousand/uL 17.98*   HEMOGLOBIN g/dL 13.5   HEMATOCRIT % 41.4   PLATELETS Thousands/uL 358     Results from last 7 days   Lab Units 08/19/24  0457 08/17/24  2121 08/17/24  0808   POTASSIUM mmol/L 3.3*   < > 3.4*   CHLORIDE mmol/L 110*   < > 110*   CO2 mmol/L 22   < > 25   BUN mg/dL 42*   < > 56*   CREATININE mg/dL 1.47*   < > 3.50*   CALCIUM mg/dL 7.9*   < > 8.8   ALK PHOS U/L  --   --  94   ALT U/L  --   --  13   AST U/L  --   --  18    < > = values in this interval not displayed.       Imaging: I have personally reviewed the pertinent imaging studies on the PACS system  Procedure: CT head wo contrast    Result Date: 8/17/2024  Narrative: CT BRAIN - WITHOUT CONTRAST INDICATION:   History of subdural hematoma last month patient requires anticoagulation. COMPARISON: CT head  without contrast 7/16/2024, 7/2/2024, 7/1/2024, 10/8/2023. MRI brain without contrast 6/12/2023. TECHNIQUE:  CT examination of the brain was performed.  Multiplanar 2D reformatted images were created from the source data. Radiation dose length product (DLP) for this visit:  966.1 mGy-cm .  This examination, like all CT scans performed in the [facility], was performed utilizing techniques to minimize radiation dose exposure, including the use of iterative reconstruction and automated exposure control. IMAGE QUALITY:  Diagnostic. FINDINGS: PARENCHYMA: Decreased attenuation is noted in periventricular and subcortical white matter demonstrating an appearance that is statistically most likely to represent advanced microangiopathic change. No CT signs of acute infarction.  No intracranial mass, mass effect or midline shift.  No acute parenchymal hemorrhage. Arterial calcifications of carotid siphons an left intradural vertebral artery. VENTRICLES AND EXTRA-AXIAL SPACES:  Ventricles and extra-axial CSF spaces are prominent commensurate with the degree of volume loss.  No hydrocephalus.  No acute intraventricular or extra-axial hemorrhage. VISUALIZED ORBITS: Normal visualized orbits. PARANASAL SINUSES: Normal visualized paranasal sinuses. CALVARIUM AND EXTRACRANIAL SOFT TISSUES:  Normal.     Impression: No acute intracranial abnormality. No acute intracranial hemorrhage, as clinically questioned. Workstation performed: [id number]     Procedure: CT abdomen pelvis wo contrast    Result Date: 8/17/2024  Narrative: CT ABDOMEN AND PELVIS WITHOUT IV CONTRAST INDICATION:   Constipation x 5 days, LOWELL. History of hernia repair. COMPARISON:  None. TECHNIQUE:  CT examination of the abdomen and pelvis was performed.   Axial, sagittal, and coronal 2D reformatted images were created from the source data and submitted for interpretation. Radiation dose length product (DLP) for this visit:  710.3 mGy-cm .  This examination,  like all CT scans performed in the [facility], was performed utilizing techniques to minimize radiation dose exposure, including the use of iterative reconstruction and automated exposure motion artifact from the patient's arms degrade images. IV Contrast: Not given. Enteric Contrast:  Enteric contrast was not administered. Image quality: FINDINGS: ABDOMEN LOWER CHEST: Dependent and linear atelectasis. Moderate to large hiatal hernia. Moderate to marked coronary artery calcification and aortic annular calcification. LIVER/BILIARY TREE:  Within normal limits. GALLBLADDER:  Within normal limits. SPLEEN:  Within normal limits. PANCREAS:  Within normal limits. ADRENAL GLANDS:  Within normal limits. KIDNEYS/URETERS: Grossly simple right upper pole cortical cyst. Bilateral, symmetric moderate hydronephrosis. Ureters are dilated to the level of distended colon to be described below. No ureteral stone or perinephric fluid. STOMACH AND BOWEL: Long segment, dilated rectosigmoid colon, distended with stool. Mild wall thickening. Presacral fatty stranding. No pneumatosis or portal venous gas. Sigmoid diverticulosis without findings of acute diverticulitis. Hiatal hernia mentioned above. Otherwise stomach and small bowel are within normal limits. APPENDIX: Not visualized.  No secondary signs of appendicitis. ABDOMINOPELVIC CAVITY:  No abnormal air, fluid or enlarged lymph nodes. VESSELS: Limited evaluation without IV contrast.  Normal aorta caliber. Atherosclerosis. Ectatic common iliac arteries. PELVIS: REPRODUCTIVE ORGANS: Prostate poorly visualized, probable mass effect from distended colon. URINARY BLADDER: Collapsed around a Lugo catheter. ABDOMINAL WALL/INGUINAL REGIONS: Asymmetric left groin edema. Surrounds the neurovascular bundle and muscles. Prominent lymph nodes measuring up to 1.4 cm (201/173). Masslike opacity anterior to the adductor muscles measuring at least 7.7 x 3.1 x 4.3 cm (length X depth X  width). Slightly greater attenuation than muscle. Mildly enlarged left common femoral vein with mildly increased density compared to the right vein. Distended colon abuts the left external iliac vein without obvious significant mass effect. BONES:  Degenerative changes. Scoliosis. The study was marked in EPIC for immediate notification.     Impression: Evidence of severe constipation, probable rectosigmoid impaction. Secondary evidence of stercoral colitis. Distended colon results in moderate bilateral hydronephrosis. Evidence of inflammation in the left groin. Masslike opacity which may be a hematoma. Question findings of left common femoral vein thrombus, which could be secondary to compression or inflammation from distended colon. Correlate clinically with direct inspection. Initial imaging can be performed with ultrasound, MRI if needed. Workstation performed: [id number]       Active medications:  The patients active medications were reviewed and modified as appropriate  VTE Prophylaxis: Heparin gtt      Code Status: Level 3 - DNAR and DNI    Ethel Quiñones PA-C  8/19/2024

## 2024-08-19 NOTE — PROGRESS NOTES
[facility]  Progress Note  Name: Jaspal Brownlee I  MRN: [id number]  Unit/Bed#: -01 I Date of Admission: 8/17/2024   Date of Service: 8/19/2024 I Hospital Day: 2    Assessment & Plan   Slow transit constipation  Assessment & Plan  He now has an increasing white blood cell count and per nursing he had some vomiting yesterday while being administered soapsuds enema.  I do think we will get a surgical assessment and abdominal flatplate to evaluate for obstruction  Pain control  Will limit anything by mouth until evaluation above    Dehydration  Assessment & Plan  As evidenced by serum sodium of 150 on admission  Patient with approximately 3 L water deficit on admission  Sodium has been improving with D5W.  Continue to trend BMP every 12.      Acute cystitis with hematuria  Assessment & Plan  UTI present admission  Continue Rocephin day 3  Unfortunately there does not appear to be urine culture pending and we may need to treat empirically    LOWELL (acute kidney injury) ([facility])  Assessment & Plan  Suspect multifactorial secondary to dehydration and urinary retention with evidence of bilateral hydronephrosis suspected due to severe constipation  Creatinine improving with IV hydration, see above  Maintain Lugo catheter until constipation resolved    Abnormal abdominal CT scan  Assessment & Plan  Masslike opacity anterior to the abductor muscles on CT scan  Physical exam unrevealing  Abdominal ultrasound pending    Urinary retention  Assessment & Plan  Most likely aggravated by constipation  Lugo catheter for now until constipation resolved  Bowel regimen    DVT (deep venous thrombosis) ([facility])  Assessment & Plan  Suspicion for left femoral DVT on CT scan  Venous duplex pending  Currently on IV heparin will transition to NOAC if confirmed on Doppler    Benign essential HTN  Assessment & Plan  Blood pressure acceptable currently not on any antihypertensives    * Dementia in other diseases  classified elsewhere, unspecified severity, with mood disturbance (HCC)  Assessment & Plan  Continue meds for behavioral disturbances  Case previously discussed with patient's daughter-in-law who is power of  patient is a DNR level 3               VTE Pharmacologic Prophylaxis: VTE Score: 6 Moderate Risk (Score 3-4) - Pharmacological DVT Prophylaxis Ordered: heparin drip.    Mobility:   Basic Mobility Inpatient Raw Score: 8  JH-HLM Goal: 3: Sit at edge of bed  JH-HLM Achieved: 2: Bed activities/Dependent transfer  JH-HLM Goal NOT achieved. Continue with multidisciplinary rounding and encourage appropriate mobility to improve upon JH-HLM goals.    Patient Centered Rounds: I performed bedside rounds with nursing staff today.   Discussions with Specialists or Other Care Team Provider: Surgery consulted    Education and Discussions with Family / Patient: Updated  (daughter in law) via phone.    Total Time Spent on Date of Encounter in care of patient: 40 mins. This time was spent on one or more of the following: performing physical exam; counseling and coordination of care; obtaining or reviewing history; documenting in the medical record; reviewing/ordering tests, medications or procedures; communicating with other healthcare professionals and discussing with patient's family/caregivers.    Current Length of Stay: 2 day(s)  Current Patient Status: Inpatient   Certification Statement: The patient will continue to require additional inpatient hospital stay due to severe constipation, acute kidney injury  Discharge Plan: Anticipate discharge in >72 hrs to discharge location to be determined pending rehab evaluations.    Code Status: Level 3 - DNAR and DNI    Subjective:   He is unable to participate in the HPI due to his underlying dementia    He appears uncomfortable and per nursing reports despite the soapsuds enema and multiple bowel regiment, he has not had a bowel movement.  He in fact did vomit  twice yesterday while they were rolling him over for the enema.  I spoke with his daughter-in-law, Lydia, who was agreeable to surgical evaluation.  With his increasing white blood cell count and increasing abdominal tenderness I am concerned of possible impending obstruction.  We spoke a little bit more about his quality of life and she did mention that it has been decreased in the last few months and he has been less engaged.  She is not necessarily looking for aggressive care but wanting to understand her options.    Objective:     Vitals:   Temp (24hrs), Av.8 °F (36.6 °C), Min:96.8 °F (36 °C), Max:99.1 °F (37.3 °C)    Temp:  [96.8 °F (36 °C)-99.1 °F (37.3 °C)] (P) 98.4 °F (36.9 °C)  HR:  [72-95] (P) 92  Resp:  [16-20] (P) 20  BP: ()/(61-80) 94/61  SpO2:  [90 %-94 %] 94 %  Body mass index is 26.7 kg/m².     Input and Output Summary (last 24 hours):     Intake/Output Summary (Last 24 hours) at 2024 0720  Last data filed at 2024 1913  Gross per 24 hour   Intake 566.53 ml   Output 600 ml   Net -33.47 ml       Physical Exam:   Physical Exam  Vitals and nursing note reviewed.   Constitutional:       General: He is in acute distress.      Appearance: He is ill-appearing.   HENT:      Head: Normocephalic and atraumatic.      Nose: Nose normal. No congestion.      Mouth/Throat:      Mouth: Mucous membranes are moist.      Pharynx: No oropharyngeal exudate.   Eyes:      General: No scleral icterus.     Conjunctiva/sclera: Conjunctivae normal.   Pulmonary:      Effort: Pulmonary effort is normal. No respiratory distress.   Abdominal:      General: There is distension.      Tenderness: There is abdominal tenderness. There is guarding.      Comments: Decreased bowel sounds   Genitourinary:     Comments: Lugo in place         Additional Data:     Labs:  Results from last 7 days   Lab Units 24  0457 24  0441 24  0808   WBC Thousand/uL 17.98*   < > 15.07*   HEMOGLOBIN g/dL 13.5   < > 13.2    HEMATOCRIT % 41.4   < > 40.5   PLATELETS Thousands/uL 358   < > 317   SEGS PCT %  --   --  83*   LYMPHO PCT %  --   --  6*   MONO PCT %  --   --  10   EOS PCT %  --   --  0    < > = values in this interval not displayed.     Results from last 7 days   Lab Units 08/18/24  1109 08/17/24  2121 08/17/24  0808   SODIUM mmol/L 147   < > 150*   POTASSIUM mmol/L 3.8   < > 3.4*   CHLORIDE mmol/L 109*   < > 110*   CO2 mmol/L 27   < > 25   BUN mg/dL 45*   < > 56*   CREATININE mg/dL 1.70*   < > 3.50*   ANION GAP mmol/L 11   < > 15*   CALCIUM mg/dL 8.4   < > 8.8   ALBUMIN g/dL  --   --  3.7   TOTAL BILIRUBIN mg/dL  --   --  1.27*   ALK PHOS U/L  --   --  94   ALT U/L  --   --  13   AST U/L  --   --  18   GLUCOSE RANDOM mg/dL 170*   < > 157*    < > = values in this interval not displayed.     Results from last 7 days   Lab Units 08/17/24  1453   INR  1.37*                   Lines/Drains:  Invasive Devices       Peripheral Intravenous Line  Duration             Peripheral IV 08/17/24 Left Arm 1 day    Peripheral IV 08/18/24 Dorsal (posterior);Left Hand <1 day              Drain  Duration             Urethral Catheter Non-latex 16 Fr. 1 day                  Urinary Catheter:  Goal for removal: Voiding trial when ambulation improves               Imaging: Personally reviewed the following imaging: abdominal/pelvic CT    Recent Cultures (last 7 days):         Last 24 Hours Medication List:   Current Facility-Administered Medications   Medication Dose Route Frequency Provider Last Rate    acetaminophen  650 mg Oral Q6H PRN Shaquille Savage MD      cefTRIAXone  1,000 mg Intravenous Q24H Shaquille Savage MD 1,000 mg (08/18/24 0922)    cyanocobalamin  1,000 mcg Oral Daily Shaquille Savage MD      dextrose 5 % and sodium chloride 0.45 %  75 mL/hr Intravenous Continuous Shaquille Savage MD 75 mL/hr (08/19/24 0534)    heparin (porcine)  3-30 Units/kg/hr (Order-Specific) Intravenous Titrated Shaquille Savage MD 23 Units/kg/hr (08/19/24  0201)    lactulose  30 g Oral 4x Daily Shaquille Savage, MD      levothyroxine  125 mcg Oral Daily Shaquille Savage, MD      LORazepam  0.5 mg Oral Q8H Shaquille Savage, MD      nystatin   Topical BID Shaquille Savage, MD      ondansetron  4 mg Intravenous Q6H PRN Shaquille Savage, MD      polyethylene glycol  17 g Oral BID Shaquille Savage, MD      pravastatin  40 mg Oral Daily Shaquille Savage, MD      QUEtiapine  100 mg Oral BID Shaquille P Janette, MD      QUEtiapine  50 mg Oral BID Shaquille P Janette, MD      senna  2 tablet Oral BID Shaquille P Janette, MD      sertraline  50 mg Oral Daily Shaquille Savage MD          Today, Patient Was Seen By: Daisy Holden MD    **Please Note: This note may have been constructed using a voice recognition system.**

## 2024-08-19 NOTE — ASSESSMENT & PLAN NOTE
As evidenced by serum sodium of 150 on admission  Patient with approximately 3 L water deficit on admission  Sodium has been improving with D5W.  Continue to trend BMP every 12.

## 2024-08-19 NOTE — ASSESSMENT & PLAN NOTE
Continue meds for behavioral disturbances  Case previously discussed with patient's daughter-in-law who is power of  patient is a DNR level 3

## 2024-08-19 NOTE — PLAN OF CARE
Problem: Prexisting or High Potential for Compromised Skin Integrity  Goal: Skin integrity is maintained or improved  Description: INTERVENTIONS:  - Identify patients at risk for skin breakdown  - Assess and monitor skin integrity  - Assess and monitor nutrition and hydration status  - Monitor labs   - Assess for incontinence   - Turn and reposition patient  - Assist with mobility/ambulation  - Relieve pressure over bony prominences  - Avoid friction and shearing  - Provide appropriate hygiene as needed including keeping skin clean and dry  - Evaluate need for skin moisturizer/barrier cream  - Collaborate with interdisciplinary team   - Patient/family teaching  - Consider wound care consult   Outcome: Progressing     Problem: GASTROINTESTINAL - ADULT  Goal: Minimal or absence of nausea and/or vomiting  Description: INTERVENTIONS:  - Administer IV fluids if ordered to ensure adequate hydration  - Maintain NPO status until nausea and vomiting are resolved  - Nasogastric tube if ordered  - Administer ordered antiemetic medications as needed  - Provide nonpharmacologic comfort measures as appropriate  - Advance diet as tolerated, if ordered  - Consider nutrition services referral to assist patient with adequate nutrition and appropriate food choices  Outcome: Progressing

## 2024-08-19 NOTE — CASE MANAGEMENT
Case Management Assessment & Discharge Planning Note    Patient name Jaspal Brownlee  Location /-01 MRN [id number]  : 1950 Date 2024       Current Admission Date: 2024  Current Admission Diagnosis:Dementia in other diseases classified elsewhere, unspecified severity, with mood disturbance (HCC)   Patient Active Problem List    Diagnosis Date Noted Date Diagnosed    Abnormal abdominal CT scan 2024     Acute cystitis with hematuria 2024     Dehydration 2024     LOWELL (acute kidney injury) (HCC) 2024     DVT (deep venous thrombosis) (HCC) 2024     Slow transit constipation 2024     Urinary retention 2024     Subdural hemorrhage ([facility]) 2024     Dementia in other diseases classified elsewhere, unspecified severity, with mood disturbance ([facility]) 2023     Encounter for person awaiting admission to adequate facility elsewhere 2023     Hypothyroidism 2023     Benign essential HTN 2023     HLD (hyperlipidemia) 2023     TORI on CPAP 2023     Generalized weakness 2023     Elevated serum creatinine 2023     History of B12 deficiency 2023     Macular degeneration 2023     Glaucoma 2023     Age-related cognitive decline 2023     Vertigo 2021       LOS (days): 2  Geometric Mean LOS (GMLOS) (days): 3.1  Days to GMLOS:1     OBJECTIVE:    Risk of Unplanned Readmission Score: 24.16         Current admission status: Inpatient       Preferred Pharmacy:   [facility]/pharmacy #[id number]  [place] - [address] [address]  Phone: [phone] Fax: [phone]    [id number] - [place] - [address]  [address]  [place] [address]  Phone: [phone] Fax: [phone]    [place] [address]  Phone: [phone] Fax:  [phone]    Primary Care Provider: Patrick Reardon MD    Primary Insurance: MEDICARE  Secondary Insurance: AARP    ASSESSMENT:  Active Health Care Proxies       Lydia Brownlee Health Care Agent - Daughter In-Law   Primary Phone: [phone] (Mobile)            Readmission Root Cause  30 Day Readmission: No    Patient Information  Admitted from:: Facility ([facility])  Mental Status: Confused  During Assessment patient was accompanied by: Not accompanied during assessment  Assessment information provided by:: Daughter  Primary Caregiver: Family  County of Residence: [place]  What city do you live in?: [place]  Home entry access options. Select all that apply.: No steps to enter home  Type of Current Residence: Facility ([facility])  Living Arrangements: Other (Comment) (LTC @ [facility])  Is patient a ?: No    Activities of Daily Living Prior to Admission  Functional Status: Assistance  Completes ADLs independently?: No  Level of ADL dependence: Assistance  Ambulates independently?: No  Level of ambulatory dependence: Assistance  Does patient use assisted devices?: No  Does patient currently own DME?: No  Does patient have a history of Outpatient Therapy (PT/OT)?: No  Does the patient have a history of Short-Term Rehab?: No  Does patient have a history of HHC?: No  Does patient currently have HHC?: No    Patient Information Continued  Income Source: SSI/SSD  Does patient have prescription coverage?: Yes  Does patient receive dialysis treatments?: No  Does patient have a history of substance abuse?: No  Does patient have a history of Mental Health Diagnosis?: No    PHQ 2/9 Screening   Reviewed PHQ 2/9 Depression Screening Score?: No    Means of Transportation  Means of Transport to Appts:: Family transport      Social Determinants of Health (SDOH)      Flowsheet Row Most Recent Value   Housing Stability    In the last 12 months,  was there a time when you were not able to pay the mortgage or rent on time? N   In the past 12 months, how many times have you moved where you were living? 0   Transportation Needs    In the past 12 months, has lack of transportation kept you from medical appointments or from getting medications? no   In the past 12 months, has lack of transportation kept you from meetings, work, or from getting things needed for daily living? No   Food Insecurity    Within the past 12 months, you worried that your food would run out before you got the money to buy more. Never true   Within the past 12 months, the food you bought just didn't last and you didn't have money to get more. Never true   Utilities    In the past 12 months has the electric, gas, oil, or water company threatened to shut off services in your home? No            DISCHARGE DETAILS:    Discharge planning discussed with:: Lydia  Freedom of Choice: Yes  Comments - Freedom of Choice: Choice is to return to LTC @ [facility] [place] [facility]  CM contacted family/caregiver?: Yes  Were Treatment Team discharge recommendations reviewed with patient/caregiver?: Yes  Did patient/caregiver verbalize understanding of patient care needs?: N/A- going to facility  Were patient/caregiver advised of the risks associated with not following Treatment Team discharge recommendations?: Yes    Contacts  Patient Contacts: Lydia Brownlee  Relationship to Patient:: Family  Contact Method: Phone  Phone Number: [phone]  Reason/Outcome: Emergency Contact, Discharge Planning    Requested Home Health Care         Is the patient interested in HHC at discharge?: No    DME Referral Provided  Referral made for DME?: No    Other Referral/Resources/Interventions Provided:  Interventions: Facility Return  Referral Comments: Return referral opened to [facility] [place] [facility]    Would you like to participate in our [facility] service program?  : No - Declined    Treatment  Team Recommendation: Facility Return, SNF  Discharge Destination Plan:: Facility Return, SNF ([facility] [place])

## 2024-08-19 NOTE — QUICK NOTE
Med Attending:    During the day patient continues to look uncomfortable.  Discussed with surgery and GI.  Surgery is recommending colonoscopy decompression however in discussion with GI this is not something they typically do.  GI is recommending manual disimpaction by surgery.    Discussed with Lydia today, his daughter letting her know that he continues to be uncomfortable and that sometimes severe constipation can be signs of end of life in a more chronic condition but she agrees with allowing a second attempt of disimpaction.    Fleets enema ordered    CHAZ

## 2024-08-19 NOTE — ASSESSMENT & PLAN NOTE
Masslike opacity anterior to the abductor muscles on CT scan  Physical exam unrevealing  Abdominal ultrasound pending

## 2024-08-19 NOTE — ASSESSMENT & PLAN NOTE
Suspicion for left femoral DVT on CT scan  Venous duplex pending  Currently on IV heparin will transition to NOAC if confirmed on Doppler

## 2024-08-19 NOTE — ASSESSMENT & PLAN NOTE
Suspect multifactorial secondary to dehydration and urinary retention with evidence of bilateral hydronephrosis suspected due to severe constipation  Creatinine improving with IV hydration, see above  Maintain Lugo catheter until constipation resolved

## 2024-08-20 ENCOUNTER — APPOINTMENT (INPATIENT)
Dept: RADIOLOGY | Facility: HOSPITAL | Age: 74
DRG: 682 | End: 2024-08-20
Payer: MEDICARE

## 2024-08-20 ENCOUNTER — APPOINTMENT (INPATIENT)
Dept: CT IMAGING | Facility: HOSPITAL | Age: 74
DRG: 682 | End: 2024-08-20
Payer: MEDICARE

## 2024-08-20 VITALS
SYSTOLIC BLOOD PRESSURE: 127 MMHG | HEART RATE: 110 BPM | BODY MASS INDEX: 26.78 KG/M2 | OXYGEN SATURATION: 92 % | WEIGHT: 180.78 LBS | RESPIRATION RATE: 20 BRPM | HEIGHT: 69 IN | DIASTOLIC BLOOD PRESSURE: 70 MMHG | TEMPERATURE: 97.6 F

## 2024-08-20 PROBLEM — A41.9 SEPSIS (HCC): Status: ACTIVE | Noted: 2024-08-20

## 2024-08-20 LAB
ANION GAP SERPL CALCULATED.3IONS-SCNC: 16 MMOL/L (ref 4–13)
APTT PPP: 100 SECONDS (ref 23–34)
APTT PPP: 91 SECONDS (ref 23–34)
BASOPHILS # BLD AUTO: 0.05 THOUSANDS/ÂΜL (ref 0–0.1)
BASOPHILS NFR BLD AUTO: 0 % (ref 0–1)
BUN SERPL-MCNC: 62 MG/DL (ref 5–25)
CALCIUM SERPL-MCNC: 7.5 MG/DL (ref 8.4–10.2)
CHLORIDE SERPL-SCNC: 110 MMOL/L (ref 96–108)
CO2 SERPL-SCNC: 18 MMOL/L (ref 21–32)
CREAT SERPL-MCNC: 2.34 MG/DL (ref 0.6–1.3)
EOSINOPHIL # BLD AUTO: 0.05 THOUSAND/ÂΜL (ref 0–0.61)
EOSINOPHIL NFR BLD AUTO: 0 % (ref 0–6)
ERYTHROCYTE [DISTWIDTH] IN BLOOD BY AUTOMATED COUNT: 13.2 % (ref 11.6–15.1)
GFR SERPL CREATININE-BSD FRML MDRD: 26 ML/MIN/1.73SQ M
GLUCOSE SERPL-MCNC: 202 MG/DL (ref 65–140)
HCT VFR BLD AUTO: 43 % (ref 36.5–49.3)
HGB BLD-MCNC: 13.8 G/DL (ref 12–17)
IMM GRANULOCYTES # BLD AUTO: 0.15 THOUSAND/UL (ref 0–0.2)
IMM GRANULOCYTES NFR BLD AUTO: 1 % (ref 0–2)
LACTATE SERPL-SCNC: 2.7 MMOL/L (ref 0.5–2)
LACTATE SERPL-SCNC: 2.7 MMOL/L (ref 0.5–2)
LYMPHOCYTES # BLD AUTO: 0.82 THOUSANDS/ÂΜL (ref 0.6–4.47)
LYMPHOCYTES NFR BLD AUTO: 4 % (ref 14–44)
MCH RBC QN AUTO: 31.4 PG (ref 26.8–34.3)
MCHC RBC AUTO-ENTMCNC: 32.1 G/DL (ref 31.4–37.4)
MCV RBC AUTO: 98 FL (ref 82–98)
MONOCYTES # BLD AUTO: 1.71 THOUSAND/ÂΜL (ref 0.17–1.22)
MONOCYTES NFR BLD AUTO: 8 % (ref 4–12)
NEUTROPHILS # BLD AUTO: 19.07 THOUSANDS/ÂΜL (ref 1.85–7.62)
NEUTS SEG NFR BLD AUTO: 87 % (ref 43–75)
NRBC BLD AUTO-RTO: 0 /100 WBCS
PLATELET # BLD AUTO: 407 THOUSANDS/UL (ref 149–390)
PMV BLD AUTO: 10.9 FL (ref 8.9–12.7)
POTASSIUM SERPL-SCNC: 3.6 MMOL/L (ref 3.5–5.3)
PROCALCITONIN SERPL-MCNC: 2.28 NG/ML
RBC # BLD AUTO: 4.4 MILLION/UL (ref 3.88–5.62)
SODIUM SERPL-SCNC: 144 MMOL/L (ref 135–147)
WBC # BLD AUTO: 21.85 THOUSAND/UL (ref 4.31–10.16)

## 2024-08-20 PROCEDURE — 83605 ASSAY OF LACTIC ACID: CPT

## 2024-08-20 PROCEDURE — 99239 HOSP IP/OBS DSCHRG MGMT >30: CPT | Performed by: INTERNAL MEDICINE

## 2024-08-20 PROCEDURE — 74176 CT ABD & PELVIS W/O CONTRAST: CPT

## 2024-08-20 PROCEDURE — 87081 CULTURE SCREEN ONLY: CPT | Performed by: INTERNAL MEDICINE

## 2024-08-20 PROCEDURE — 87040 BLOOD CULTURE FOR BACTERIA: CPT

## 2024-08-20 PROCEDURE — 93005 ELECTROCARDIOGRAM TRACING: CPT

## 2024-08-20 PROCEDURE — 84145 PROCALCITONIN (PCT): CPT

## 2024-08-20 PROCEDURE — 99233 SBSQ HOSP IP/OBS HIGH 50: CPT | Performed by: PHYSICIAN ASSISTANT

## 2024-08-20 PROCEDURE — 85025 COMPLETE CBC W/AUTO DIFF WBC: CPT | Performed by: INTERNAL MEDICINE

## 2024-08-20 PROCEDURE — 71045 X-RAY EXAM CHEST 1 VIEW: CPT

## 2024-08-20 PROCEDURE — 85730 THROMBOPLASTIN TIME PARTIAL: CPT | Performed by: INTERNAL MEDICINE

## 2024-08-20 PROCEDURE — 99223 1ST HOSP IP/OBS HIGH 75: CPT | Performed by: INTERNAL MEDICINE

## 2024-08-20 PROCEDURE — 80048 BASIC METABOLIC PNL TOTAL CA: CPT | Performed by: INTERNAL MEDICINE

## 2024-08-20 RX ORDER — VANCOMYCIN HYDROCHLORIDE 1 G/200ML
12.5 INJECTION, SOLUTION INTRAVENOUS DAILY PRN
Status: DISCONTINUED | OUTPATIENT
Start: 2024-08-20 | End: 2024-08-20

## 2024-08-20 RX ORDER — FENTANYL CITRATE 50 UG/ML
25 INJECTION, SOLUTION INTRAMUSCULAR; INTRAVENOUS EVERY 2 HOUR PRN
Status: DISCONTINUED | OUTPATIENT
Start: 2024-08-20 | End: 2024-08-20 | Stop reason: HOSPADM

## 2024-08-20 RX ORDER — MORPHINE SULFATE 20 MG/5ML
5 SOLUTION ORAL EVERY 4 HOURS PRN
Qty: 20 ML | Refills: 0 | Status: SHIPPED | OUTPATIENT
Start: 2024-08-20

## 2024-08-20 RX ORDER — ONDANSETRON 2 MG/ML
4 INJECTION INTRAMUSCULAR; INTRAVENOUS EVERY 4 HOURS PRN
Status: DISCONTINUED | OUTPATIENT
Start: 2024-08-20 | End: 2024-08-20 | Stop reason: HOSPADM

## 2024-08-20 RX ORDER — BISACODYL 10 MG
10 SUPPOSITORY, RECTAL RECTAL DAILY
Start: 2024-08-21

## 2024-08-20 RX ORDER — HALOPERIDOL 5 MG/ML
0.5 INJECTION INTRAMUSCULAR EVERY 2 HOUR PRN
Status: DISCONTINUED | OUTPATIENT
Start: 2024-08-20 | End: 2024-08-20 | Stop reason: HOSPADM

## 2024-08-20 RX ORDER — GLYCOPYRROLATE 0.2 MG/ML
0.1 INJECTION INTRAMUSCULAR; INTRAVENOUS EVERY 4 HOURS PRN
Status: DISCONTINUED | OUTPATIENT
Start: 2024-08-20 | End: 2024-08-20 | Stop reason: HOSPADM

## 2024-08-20 RX ORDER — LORAZEPAM 2 MG/ML
1 CONCENTRATE ORAL EVERY 4 HOURS PRN
Qty: 15 ML | Refills: 0 | Status: SHIPPED | OUTPATIENT
Start: 2024-08-20

## 2024-08-20 RX ORDER — SODIUM CHLORIDE 9 MG/ML
100 INJECTION, SOLUTION INTRAVENOUS CONTINUOUS
Status: DISCONTINUED | OUTPATIENT
Start: 2024-08-20 | End: 2024-08-20 | Stop reason: HOSPADM

## 2024-08-20 RX ORDER — LORAZEPAM 2 MG/ML
1 INJECTION INTRAMUSCULAR EVERY 2 HOUR PRN
Status: DISCONTINUED | OUTPATIENT
Start: 2024-08-20 | End: 2024-08-20 | Stop reason: HOSPADM

## 2024-08-20 RX ORDER — POLYETHYLENE GLYCOL 3350 17 G/17G
17 POWDER, FOR SOLUTION ORAL 2 TIMES DAILY
Start: 2024-08-20

## 2024-08-20 RX ORDER — POLYETHYLENE GLYCOL 3350 17 G/17G
17 POWDER, FOR SOLUTION ORAL 2 TIMES DAILY
Status: DISCONTINUED | OUTPATIENT
Start: 2024-08-20 | End: 2024-08-20 | Stop reason: HOSPADM

## 2024-08-20 RX ORDER — CEFEPIME HYDROCHLORIDE 1 G/50ML
1000 INJECTION, SOLUTION INTRAVENOUS EVERY 24 HOURS
Status: DISCONTINUED | OUTPATIENT
Start: 2024-08-20 | End: 2024-08-20

## 2024-08-20 RX ADMIN — VANCOMYCIN HYDROCHLORIDE 2000 MG: 1 INJECTION, POWDER, LYOPHILIZED, FOR SOLUTION INTRAVENOUS at 06:06

## 2024-08-20 RX ADMIN — SODIUM CHLORIDE, SODIUM LACTATE, POTASSIUM CHLORIDE, AND CALCIUM CHLORIDE 1000 ML: .6; .31; .03; .02 INJECTION, SOLUTION INTRAVENOUS at 05:46

## 2024-08-20 RX ADMIN — NYSTATIN: 100000 POWDER TOPICAL at 17:26

## 2024-08-20 RX ADMIN — BISACODYL 10 MG: 10 SUPPOSITORY RECTAL at 09:25

## 2024-08-20 RX ADMIN — SODIUM CHLORIDE, SODIUM LACTATE, POTASSIUM CHLORIDE, AND CALCIUM CHLORIDE 1000 ML: .6; .31; .03; .02 INJECTION, SOLUTION INTRAVENOUS at 04:54

## 2024-08-20 RX ADMIN — NYSTATIN: 100000 POWDER TOPICAL at 09:24

## 2024-08-20 RX ADMIN — CEFEPIME HYDROCHLORIDE 1000 MG: 1 INJECTION, SOLUTION INTRAVENOUS at 09:24

## 2024-08-20 RX ADMIN — SODIUM CHLORIDE, SODIUM LACTATE, POTASSIUM CHLORIDE, AND CALCIUM CHLORIDE 1000 ML: .6; .31; .03; .02 INJECTION, SOLUTION INTRAVENOUS at 05:20

## 2024-08-20 RX ADMIN — ACETAMINOPHEN 1000 MG: 10 INJECTION INTRAVENOUS at 03:35

## 2024-08-20 RX ADMIN — SODIUM CHLORIDE 100 ML/HR: 0.9 INJECTION, SOLUTION INTRAVENOUS at 09:25

## 2024-08-20 RX ADMIN — HEPARIN SODIUM 13 UNITS/KG/HR: 10000 INJECTION, SOLUTION INTRAVENOUS at 13:29

## 2024-08-20 RX ADMIN — PIPERACILLIN SODIUM AND TAZOBACTAM SODIUM 4.5 G: 4; .5 INJECTION, POWDER, LYOPHILIZED, FOR SOLUTION INTRAVENOUS at 05:25

## 2024-08-20 NOTE — DISCHARGE SUMMARY
Discharge Summary - [facility]  Patient: Jaspal Brownlee 74 y.o. male   MRN: [id number]  PCP: Patrick Reardon MD  Unit/Bed#: -01 Encounter: [id number]            Discharging Physician / Practitioner: Daxa Devries MD  PCP: Patrick Reardon MD  Admission Date:   Admission Orders (From admission, onward)       Ordered        08/17/24 1024  INPATIENT ADMISSION  Once                          Discharge Date: 08/20/24      Reason for Admission:  Constipation      Discharge Diagnoses:     Principal Problem:    Severe constipation with mechanical obstruction    Sepsis (HCC)    Active Problems:    Benign essential HTN    Dementia with mood disturbance (HCC)    Dehydration    LOWELL (acute kidney injury) (HCC)    DVT (deep venous thrombosis) (HCC)    Urinary retention    Hypothyroidism    History of subdural hemorrhage    Anion gap metabolic acidosis due to elevated lactate      Consultations During Hospital Stay:  Gastroenterology  General Surgery      Hospital Course:     Jaspal Brownlee is a 74 y.o. male patient who originally presented to the hospital on 8/17/2024 due to complaints of weakness and constipation.  His hospitalization was gated by discovery of an acute left femoral vein DVT, for which he was initiated on anticoagulation with a heparin drip, after undergoing an updated CT of head, due to history of a recent subdural hemorrhage.  CT imaging of the head was fortunately negative for residual hemorrhaging.  He also had progression of acute kidney injury treated with IV fluid hydration.  Unfortunately, despite various treatments for constipation, as well as attempts at manual disimpaction, his abdominal distention worsened and imaging revealed evidence of severe constipation with rectosigmoid impaction, stercoral colitis, and distention of colon resulting in bilateral hydronephrosis. There was also mention of an inflammatory process of the left groin, possibly a mass versus  "hematoma.  Repeat CT imaging unfortunately revealed progression of the fecal impaction with concern over worsening severity of colonic obstruction and new mesenteric edema without obvious pneumatosis or portal venous gas.  Per general surgery, his fecal impaction was up to the level of the transverse colon not amenable by medical therapy or attempts at manual disimpaction, hence, initially recommended colonoscopic evacuation.  After discussion with gastroenterology, this was not amenable to using a standard colonoscope due to complete colonic obstruction.  Furthermore, gastroenterology agree that further attempts at attempting disimpaction would be futile care at this point.  Patient developed sepsis criteria overnight including tachycardia/tachypnea along with an elevated WBC count and fevers, along with an increased lactate level.  He was initiated on empiric intravenous antibiotics.  IV fluids were resumed as mild improvement in acute kidney injury, unfortunately reversed and worsened with a creatinine peaking at 3.5.  Due to inability to unfortunately relieve his colonic obstruction and profound constipation, along with comorbidities including dementia and recent subdural hematoma, along with his worsening acute issues and poor quality of life, after discussion with gastroenterology and general surgery, along with listed contact, daughter-in-law, Lydia, he has been transitioned to comfort measures only and will be discharged to his nursing facility under hospice care today.      Condition at Discharge: poor       Discharge Day Visit / Exam:     Vitals:  Blood Pressure: 127/70 (08/20/24 1513)  Pulse: (!) 110 (08/20/24 1513)  Temperature: 97.6 °F (36.4 °C) (08/20/24 1513)  Temp Source: Tympanic (08/20/24 1513)  Respirations: 20 (08/20/24 1513)  Height: 5' 9\" (175.3 cm) (08/17/24 1114)  Weight - Scale: 82 kg (180 lb 12.4 oz) (08/17/24 1114)  SpO2: 92 % (08/20/24 1513)      Physical exam:  I had a face-to-face " encounter with the patient on day of discharge.      Discussion with Patient and/or Family:  The patient has been advised to return to the ER immediately if any symptoms recur or worsen.       Discharge instructions/Information to Patient and/or Family:   See after visit summary for information provided to patient and/or family.        Provisions for Follow-Up Care:  See after visit summary for information related to follow-up care and any pertinent home health orders.        Disposition:   Hospice at nursing facility      Discharge Medications:  See after visit summary for reconciled discharge medications provided to patient and/or family.        Discharge Statement:  I spent 38 minutes discharging the patient. This time was spent on the day of discharge. I had direct contact with the patient on the day of discharge. Greater than 50% of the total time was spent examining patient, answering all patient questions, arranging and discussing plan of care with patient as well as directly providing post-discharge instructions.  Additional time then spent on discharge activities.           TRA DOW MD   Hospitalist - [facility]        ** Please Note: This note is constructed using a voice recognition dictation system.  An occasional wrong word/phrase or “sound-a-like” substitution may have been picked up by dictation device due to the inherent limitations of voice recognition software.  Read the chart carefully and recognize, using reasonable context, where substitutions may have occurred.**

## 2024-08-20 NOTE — PLAN OF CARE
Problem: Prexisting or High Potential for Compromised Skin Integrity  Goal: Skin integrity is maintained or improved  Description: INTERVENTIONS:  - Identify patients at risk for skin breakdown  - Assess and monitor skin integrity  - Assess and monitor nutrition and hydration status  - Monitor labs   - Assess for incontinence   - Turn and reposition patient  - Assist with mobility/ambulation  - Relieve pressure over bony prominences  - Avoid friction and shearing  - Provide appropriate hygiene as needed including keeping skin clean and dry  - Evaluate need for skin moisturizer/barrier cream  - Collaborate with interdisciplinary team   - Patient/family teaching  - Consider wound care consult   Outcome: Progressing         Problem: NEUROSENSORY - ADULT  Goal: Achieves stable or improved neurological status  Description: INTERVENTIONS  - Monitor and report changes in neurological status  - Monitor vital signs such as temperature, blood pressure, glucose, and any other labs ordered   - Initiate measures to prevent increased intracranial pressure  - Monitor for seizure activity and implement precautions if appropriate      Outcome: Progressing  Goal: Remains free of injury related to seizures activity  Description: INTERVENTIONS  - Maintain airway, patient safety  and administer oxygen as ordered  - Monitor patient for seizure activity, document and report duration and description of seizure to physician/advanced practitioner  - If seizure occurs,  ensure patient safety during seizure  - Reorient patient post seizure  - Seizure pads on all 4 side rails  - Instruct patient/family to notify RN of any seizure activity including if an aura is experienced  - Instruct patient/family to call for assistance with activity based on nursing assessment  - Administer anti-seizure medications if ordered    Outcome: Progressing  Goal: Achieves maximal functionality and self care  Description: INTERVENTIONS  - Monitor swallowing and  airway patency with patient fatigue and changes in neurological status  - Encourage and assist patient to increase activity and self care.   - Encourage visually impaired, hearing impaired and aphasic patients to use assistive/communication devices  Outcome: Progressing          Problem: PAIN - ADULT  Goal: Verbalizes/displays adequate comfort level or baseline comfort level  Description: Interventions:  - Encourage patient to monitor pain and request assistance  - Assess pain using appropriate pain scale  - Administer analgesics based on type and severity of pain and evaluate response  - Implement non-pharmacological measures as appropriate and evaluate response  - Consider cultural and social influences on pain and pain management  - Notify physician/advanced practitioner if interventions unsuccessful or patient reports new pain  Outcome: Progressing             Problem: GASTROINTESTINAL - ADULT  Goal: Minimal or absence of nausea and/or vomiting  Description: INTERVENTIONS:  - Administer IV fluids if ordered to ensure adequate hydration  - Maintain NPO status until nausea and vomiting are resolved  - Nasogastric tube if ordered  - Administer ordered antiemetic medications as needed  - Provide nonpharmacologic comfort measures as appropriate  - Advance diet as tolerated, if ordered  - Consider nutrition services referral to assist patient with adequate nutrition and appropriate food choices  Outcome: Progressing  Goal: Maintains or returns to baseline bowel function  Description: INTERVENTIONS:  - Assess bowel function  - Encourage oral fluids to ensure adequate hydration  - Administer IV fluids if ordered to ensure adequate hydration  - Administer ordered medications as needed  - Encourage mobilization and activity  - Consider nutritional services referral to assist patient with adequate nutrition and appropriate food choices  Outcome: Progressing  Goal: Maintains adequate nutritional intake  Description:  INTERVENTIONS:  - Monitor percentage of each meal consumed  - Identify factors contributing to decreased intake, treat as appropriate  - Assist with meals as needed  - Monitor I&O, weight, and lab values if indicated  - Obtain nutrition services referral as needed  Outcome: Progressing  Goal: Establish and maintain optimal ostomy function  Description: INTERVENTIONS:  - Assess bowel function  - Encourage oral fluids to ensure adequate hydration  - Administer IV fluids if ordered to ensure adequate hydration   - Administer ordered medications as needed  - Encourage mobilization and activity  - Nutrition services referral to assist patient with appropriate food choices  - Assess stoma site  - Consider wound care consult   Outcome: Progressing  Goal: Oral mucous membranes remain intact  Description: INTERVENTIONS  - Assess oral mucosa and hygiene practices  - Implement preventative oral hygiene regimen  - Implement oral medicated treatments as ordered  - Initiate Nutrition services referral as needed  Outcome: Progressing     Problem: GENITOURINARY - ADULT  Goal: Absence of urinary retention  Description: INTERVENTIONS:  - Assess patient’s ability to void and empty bladder  - Monitor I/O  - Bladder scan as needed  - Discuss with physician/AP medications to alleviate retention as needed  - Discuss catheterization for long term situations as appropriate  Outcome: Progressing     Problem: GENITOURINARY - ADULT  Goal: Urinary catheter remains patent  Description: INTERVENTIONS:  - Assess patency of urinary catheter  - If patient has a chronic eller, consider changing catheter if non-functioning  - Follow guidelines for intermittent irrigation of non-functioning urinary catheter  Outcome: Progressing     Problem: METABOLIC, FLUID AND ELECTROLYTES - ADULT  Goal: Electrolytes maintained within normal limits  Description: INTERVENTIONS:  - Monitor labs and assess patient for signs and symptoms of electrolyte imbalances  -  Administer electrolyte replacement as ordered  - Monitor response to electrolyte replacements, including repeat lab results as appropriate  - Instruct patient on fluid and nutrition as appropriate  Outcome: Progressing     Problem: Nutrition/Hydration-ADULT  Goal: Nutrient/Hydration intake appropriate for improving, restoring or maintaining nutritional needs  Description: Monitor and assess patient's nutrition/hydration status for malnutrition. Collaborate with interdisciplinary team and initiate plan and interventions as ordered.  Monitor patient's weight and dietary intake as ordered or per policy. Utilize nutrition screening tool and intervene as necessary. Determine patient's food preferences and provide high-protein, high-caloric foods as appropriate.     INTERVENTIONS:  - Monitor oral intake, urinary output, labs, and treatment plans  - Assess nutrition and hydration status and recommend course of action  - Evaluate amount of meals eaten  - Assist patient with eating if necessary   - Allow adequate time for meals  - Recommend/ encourage appropriate diets, oral nutritional supplements, and vitamin/mineral supplements  - Order, calculate, and assess calorie counts as needed  - Recommend, monitor, and adjust tube feedings and TPN/PPN based on assessed needs  - Assess need for intravenous fluids  - Provide specific nutrition/hydration education as appropriate  - Include patient/family/caregiver in decisions related to nutrition  Outcome: Progressing

## 2024-08-20 NOTE — CASE MANAGEMENT
Case Management Discharge Planning Note    Patient name Jaspal Brownlee  Location /-01 MRN [id number]  : 1950 Date 2024       Current Admission Date: 2024  Current Admission Diagnosis:Dementia in other diseases classified elsewhere, unspecified severity, with mood disturbance (HCC)   Patient Active Problem List    Diagnosis Date Noted Date Diagnosed    Abnormal abdominal CT scan 2024     Acute cystitis with hematuria 2024     Dehydration 2024     LOWELL (acute kidney injury) (HCC) 2024     DVT (deep venous thrombosis) (HCC) 2024     Slow transit constipation 2024     Urinary retention 2024     Subdural hemorrhage (HCC) 2024     Dementia in other diseases classified elsewhere, unspecified severity, with mood disturbance (HCC) 2023     Encounter for person awaiting admission to adequate facility elsewhere 2023     Hypothyroidism 2023     Benign essential HTN 2023     HLD (hyperlipidemia) 2023     TORI on CPAP 2023     Generalized weakness 2023     Elevated serum creatinine 2023     History of B12 deficiency 2023     Macular degeneration 2023     Glaucoma 2023     Age-related cognitive decline 2023     Vertigo 2021       LOS (days): 3  Geometric Mean LOS (GMLOS) (days): 3.1  Days to GMLOS:-0.1     OBJECTIVE:  Risk of Unplanned Readmission Score: 25.19         Current admission status: Inpatient   Preferred Pharmacy:   [facility]/pharmacy #[id number]  [place] - [address] [address]  Phone: [phone] Fax: [phone]    [id number] - [place] - [address]  [address]  [place] [address]  Phone: [phone] Fax: [phone]    [place] [address]  Phone: [phone] Fax: [phone]    Primary Care Provider:  Patrick Reardon MD    Primary Insurance: MEDICARE  Secondary Insurance: AARP    DISCHARGE DETAILS:    Discharge planning discussed with:: Lydia  Freedom of Choice: Yes  Comments - Freedom of Choice: Choice is to return to LTC @ [facility] on comfort care with transition to hospice.  CM contacted family/caregiver?: Yes  Were Treatment Team discharge recommendations reviewed with patient/caregiver?: Yes  Did patient/caregiver verbalize understanding of patient care needs?: N/A- going to facility  Were patient/caregiver advised of the risks associated with not following Treatment Team discharge recommendations?: Yes    Contacts  Patient Contacts: Lydia Brownlee  Relationship to Patient:: Family  Contact Method: Phone  Phone Number: [phone]  Reason/Outcome: Emergency Contact, Discharge Planning    Requested Home Health Care         Is the patient interested in HHC at discharge?: No    DME Referral Provided  Referral made for DME?: No    Would you like to participate in our [facility] service program?  : No - Declined    Treatment Team Recommendation: Facility Return, SNF  Discharge Destination Plan:: Facility Return, SNF ([facility])  Transport at Discharge : [facility]  Dispatcher Contacted: Yes  Number/Name of Dispatcher: ROUNDTRIP  Transported by (Company and Unit #): [facility]  ETA of Transport (Date): 08/20/24  ETA of Transport (Time): 1845     IMM Given (Date):: 08/20/24  IMM Given to:: Family  IMM reviewed with  Lydia Freeman  agrees with discharge determination.     Additional Comments: Per provider, family requesting transition to comfort care and hospice. CM confirmed with admissions @ [facility] that patient is able to return comfort care and  with sign patient onto hospice with a provider at the facility. CM confirmed same with family. All in agreement with D/C today.    Accepting Facility Name, City & State : [facility]  [facility] at [address] [address]  Receiving Facility/Agency Phone Number: [phone]  Facility/Agency Fax Number: [phone]  Facility Insurance Auth Number: [id number]

## 2024-08-20 NOTE — SEPSIS NOTE
"  Sepsis Note   Jaspal Brownlee 74 y.o. male MRN: [id number]  Unit/Bed#: -01 Encounter: [id number]       Initial Sepsis Screening       Row Name 08/17/24 0921                Is the patient's history suggestive of a new or worsening infection? Yes (Proceed)  -NA        Suspected source of infection urinary tract infection  -NA        Indicate SIRS criteria Leukocytosis (WBC > 95994 IJL) OR Leukopenia (WBC <4000 IJL) OR Bandemia (WBC >10% bands)  -NA        Are two or more of the above signs & symptoms of infection both present and new to the patient? No  -NA        Assess for evidence of organ dysfunction: Are any of the below criteria present within 6 hours of suspected infection and SIRS criteria that are NOT considered to be chronic conditions? --  -NA        Date of presentation of severe sepsis --        Time of presentation of severe sepsis --        Sepsis Note: Click \"NEXT\" below (NOT \"close\") to generate sepsis note based on above information. --                  User Key  (r) = Recorded By, (t) = Taken By, (c) = Cosigned By      Initials Name Provider Type    NA Dixie Holliday MD Physician                        Body mass index is 26.7 kg/m².  Wt Readings from Last 1 Encounters:   08/17/24 82 kg (180 lb 12.4 oz)        Ideal body weight: 70.7 kg (155 lb 13.8 oz)  Adjusted ideal body weight: 75.2 kg (165 lb 13.3 oz)  Received sepsis zero alert on Mr. Brownlee. Chart was reviewed and noted increased leukocytosis, vital signs reflect fever of 101.4, tachycardia and tachypnea. Also noted worsening of kidney function, the patient meets severe sepsis criteria  as evidence of tachycardia, tachypnea, fever, increased leukocytosis and worsening in kidney function with a possible source bowel obstruction vs. Cystitis or a combination of both. Lactic and procalcitonin are elevated. EKG shows sinus tachycardia.     Plan:     - Administered fluids 30 mg x kg as ordered  - Ceftriaxone D/C start vancomycin and " zosyn   - IV tylenol for fever and pain  - Blood cultures ordered, awaiting for results  - Obtain lactic 2 hrs  - Check next day procal (8/21/24)  -Monitor CBC trend daily  - Monitor kidney function with daily BMP  -Monitor VS per unit protocol or when needed, avoid hypotension  -Follow up with [facility] final results

## 2024-08-20 NOTE — CONSULTS
Consultation -  [facility]  Jaspal Brownlee 74 y.o. male MRN: [id number]  Unit/Bed#: -01 Encounter: [id number]            Reason for Consult / Principal Problem:   constipation      ASSESSMENT AND PLAN:    73 y/o male whom is wheelchair bound with history of dementia and subdural hemorrhage s/p fall from chair last month, presenting with constipation.   CT on admission revealed b/l hydronephrosis, hiatal hernia, stercoral colitis with recto-sigmoid junction measuring 13-14 cm in the setting of fecal impaction up to the transverse colon, and masslike opacity anterior to adductor muscles in left groin.   Patient appears uncomfortable with increased work of breathing. Leukocytosis and LOWELL continue to worsen. Eval for aspiration, CXR pending  Lactic is mildly elevated at 2.7  Has received fleets enema & dulcolax suppository over the past 24 hrs which reported have been unsuccessful unsuccessful  -Ordered repeat noncontast CT in setting of LOWELL, spoke with radiologist, patient with worsening colonic obstruction/distention from stool burden, does have fluid and distention of small bowel as well and mesenteric edema with some ascites which is nonspecific, no obvious mesenteric ischemia, no free air or pneumatosis noted  - Staff reports multiple manual attempts have been unsuccessful according to staff, will re-attempt at bedside again today  -Continue n.p.o. status  -Spoke with hospitalist regarding case  -Poor overall prognosis, recommend goals of care discussion with family    Thank you for the consultation.  Case will be discussed with Dr. Medina.      ______________________________________________________________________    HPI:    74 y.o. male from nursing home with constipation, last BM 5 days prior ro admission. PMH dementia, subdural hemorrhage last month s/p fall forward from wheelchair, HTN, HLD, wheelchair bound who presents with constipation.  Patient was at skilled facility [facility]  performed 3 prior to admission showed impaction . Ct on admission showing evidence of severe constipation, probable rectosigmoid impaction. Secondary evidence of stercoral colitis.  Distended colon results in moderate bilateral hydronephrosis. Seen by surgery who recommended GI consult. KUB repeated yesterday which shows colonic distension, official read pending. He was given enema and Dulcolax suppository ordered. Pt spiking temps, worsening leukocytosis, LOWELL also worsened today.  Patient is nonverbal and unable to give any history.  Nurse reports that he was not doing well overnight and was requiring multiple fluid boluses and noted to be febrile.  Spoke with nurse and apparently disimpaction was attempted but unsuccessful.  Surgery is following, they had recommended possible colonoscopic disimpaction as manual disimpaction was unsuccessful.  Patient remains on antibiotics.  Chest x-ray also performed this morning which is pending.  Currently on 4 L of oxygen.  Patient did reportedly have vomiting yesterday.    REVIEW OF SYSTEMS:    CONSTITUTIONAL: Denies any fever, chills, rigors, and weight loss.  HEENT: No earache or tinnitus. Denies hearing loss or visual disturbances.  CARDIOVASCULAR: No chest pain or palpitations.   RESPIRATORY: Denies any cough, hemoptysis, shortness of breath or dyspnea on exertion.  GASTROINTESTINAL: As noted in the History of Present Illness.   GENITOURINARY: No problems with urination. Denies any hematuria or dysuria.  NEUROLOGIC: No dizziness or vertigo, denies headaches.   MUSCULOSKELETAL: Denies any muscle or joint pain.   SKIN: Denies skin rashes or itching.   ENDOCRINE: Denies excessive thirst. Denies intolerance to heat or cold.  PSYCHOSOCIAL: Denies depression or anxiety. Denies any recent memory loss.       Historical Information   Past Medical History:   Diagnosis Date    Disease of thyroid gland     Hyperlipidemia     Hypertension      Past Surgical History:   Procedure  Laterality Date    HERNIA REPAIR      REPLACEMENT TOTAL KNEE BILATERAL       Social History   Social History     Substance and Sexual Activity   Alcohol Use Not Currently    Comment: no drinks in at least 6 months     Social History     Substance and Sexual Activity   Drug Use Never     Social History     Tobacco Use   Smoking Status Former    Current packs/day: 0.00    Types: Cigarettes    Quit date: 10/23/2009    Years since quittin.8   Smokeless Tobacco Never     Family History   Family history unknown: Yes       Meds/Allergies       Medications Prior to Admission:     acetaminophen (TYLENOL) 650 mg CR tablet    cyanocobalamin (VITAMIN B-12) 1000 MCG tablet    levothyroxine 125 mcg tablet    LORazepam (ATIVAN) 0.5 mg tablet    pravastatin (PRAVACHOL) 40 mg tablet    QUEtiapine (SEROquel) 100 mg tablet    QUEtiapine (SEROquel) 50 mg tablet    senna-docusate sodium (SENOKOT-S) 8.6-50 mg per tablet    sertraline (ZOLOFT) 50 mg tablet    levETIRAcetam (Keppra) 500 mg tablet  Current Facility-Administered Medications   Medication Dose Route Frequency    acetaminophen (Ofirmev) injection 1,000 mg  1,000 mg Intravenous Q6H PRN    bisacodyl (DULCOLAX) rectal suppository 10 mg  10 mg Rectal Daily    cefepime (MAXIPIME) IVPB (premix in dextrose) 1,000 mg 50 mL  1,000 mg Intravenous Q24H    heparin (porcine) 25,000 units in 0.45% NaCl 250 mL infusion (premix)  3-30 Units/kg/hr (Order-Specific) Intravenous Titrated    levothyroxine tablet 125 mcg  125 mcg Oral Daily    LORazepam (ATIVAN) injection 0.5 mg  0.5 mg Intravenous Q8H PRN    morphine injection 2 mg  2 mg Intravenous Q4H PRN    nystatin (MYCOSTATIN) powder   Topical BID    ondansetron (ZOFRAN) injection 4 mg  4 mg Intravenous Q6H PRN    polyethylene glycol (MIRALAX) packet 17 g  17 g Oral BID    sodium chloride 0.9 % infusion  100 mL/hr Intravenous Continuous    vancomycin (VANCOCIN) IVPB (premix in dextrose) 1,000 mg 200 mL  12.5 mg/kg Intravenous Daily PRN  "      No Known Allergies        Objective     Blood pressure 121/73, pulse 100, temperature 97.7 °F (36.5 °C), temperature source Axillary, resp. rate 21, height 5' 9\" (1.753 m), weight 82 kg (180 lb 12.4 oz), SpO2 92%. Body mass index is 26.7 kg/m².      Intake/Output Summary (Last 24 hours) at 8/20/2024 0925  Last data filed at 8/20/2024 0622  Gross per 24 hour   Intake 3000 ml   Output 750 ml   Net 2250 ml         PHYSICAL EXAM:      General Appearance:   Sleeping and only grimaces when abdomen is palpated, appears flushed   HEENT:   Normocephalic, atraumatic, anicteric.     Neck:  Supple, symmetrical, trachea midline   Lungs:   Clear to auscultation anteriorly, poor effort   Heart::   Regular rate and rhythm; no murmur, rub, or gallop.   Abdomen:   Hypoactive bowel sounds, positive distention, firm, diffusely tender to palpation, tympanic   Genitalia:   Deferred    Rectal:   Deferred    Extremities:  No cyanosis, clubbing or edema    Pulses:  2+ and symmetric all extremities    Skin:  No jaundice, rashes, or lesions    Lymph nodes:  No palpable cervical lymphadenopathy        Lab Results:   Admission on 08/17/2024   Component Date Value    Ventricular Rate 08/17/2024 95     Atrial Rate 08/17/2024 95     NM Interval 08/17/2024 180     QRSD Interval 08/17/2024 80     QT Interval 08/17/2024 344     QTC Interval 08/17/2024 432     P Axis 08/17/2024 51     QRS Axis 08/17/2024 -13     T Wave [place] 08/17/2024 26     WBC 08/17/2024 15.07 (H)     RBC 08/17/2024 4.13     Hemoglobin 08/17/2024 13.2     Hematocrit 08/17/2024 40.5     MCV 08/17/2024 98     MCH 08/17/2024 32.0     MCHC 08/17/2024 32.6     RDW 08/17/2024 13.1     MPV 08/17/2024 10.5     Platelets 08/17/2024 317     nRBC 08/17/2024 0     Segmented % 08/17/2024 83 (H)     Immature Grans % 08/17/2024 1     Lymphocytes % 08/17/2024 6 (L)     Monocytes % 08/17/2024 10     Eosinophils Relative 08/17/2024 0     Basophils Relative 08/17/2024 0     Absolute Neutrophils " 08/17/2024 12.61 (H)     Absolute Immature Grans 08/17/2024 0.07     Absolute Lymphocytes 08/17/2024 0.93     Absolute Monocytes 08/17/2024 1.44 (H)     Eosinophils Absolute 08/17/2024 0.00     Basophils Absolute 08/17/2024 0.02     Sodium 08/17/2024 150 (H)     Potassium 08/17/2024 3.4 (L)     Chloride 08/17/2024 110 (H)     CO2 08/17/2024 25     ANION GAP 08/17/2024 15 (H)     BUN 08/17/2024 56 (H)     Creatinine 08/17/2024 3.50 (H)     Glucose 08/17/2024 157 (H)     Calcium 08/17/2024 8.8     AST 08/17/2024 18     ALT 08/17/2024 13     Alkaline Phosphatase 08/17/2024 94     Total Protein 08/17/2024 7.2     Albumin 08/17/2024 3.7     Total Bilirubin 08/17/2024 1.27 (H)     eGFR 08/17/2024 16     Lipase 08/17/2024 40     Color, UA 08/17/2024 Shanae (A)     Clarity, UA 08/17/2024 Slightly Cloudy (A)     Specific Gravity, UA 08/17/2024 1.020     pH, UA 08/17/2024 6.0     Leukocytes, UA 08/17/2024 Negative     Nitrite, UA 08/17/2024 Positive (A)     Protein, UA 08/17/2024 Trace (A)     Glucose, UA 08/17/2024 Negative     Ketones, UA 08/17/2024 Negative     Urobilinogen, UA 08/17/2024 1.0     Bilirubin, UA 08/17/2024 2+ (A)     Occult Blood, UA 08/17/2024 Negative     Total CK 08/17/2024 62     RBC, UA 08/17/2024 0-1     WBC, UA 08/17/2024 0-5     Epithelial Cells 08/17/2024 Occasional     Bacteria, UA 08/17/2024 Occasional     MUCUS THREADS 08/17/2024 Occasional (A)     MRSA Culture Only 08/17/2024 No Methicillin Resistant Staphlyococcus aureus (MRSA) isolated     PTT 08/17/2024 40 (H)     Protime 08/17/2024 17.3 (H)     INR 08/17/2024 1.37 (H)     Sodium 08/17/2024 150 (H)     Potassium 08/17/2024 3.5     Chloride 08/17/2024 110 (H)     CO2 08/17/2024 25     ANION GAP 08/17/2024 15 (H)     BUN 08/17/2024 52 (H)     Creatinine 08/17/2024 2.34 (H)     Glucose 08/17/2024 136     Calcium 08/17/2024 8.5     eGFR 08/17/2024 26     PTT 08/17/2024 84 (H)     Sodium 08/18/2024 149 (H)     Potassium 08/18/2024 3.1 (L)      Chloride 08/18/2024 110 (H)     CO2 08/18/2024 27     ANION GAP 08/18/2024 12     BUN 08/18/2024 46 (H)     Creatinine 08/18/2024 1.86 (H)     Glucose 08/18/2024 133     Calcium 08/18/2024 8.3 (L)     eGFR 08/18/2024 34     WBC 08/18/2024 12.30 (H)     RBC 08/18/2024 3.78 (L)     Hemoglobin 08/18/2024 12.1     Hematocrit 08/18/2024 37.8     MCV 08/18/2024 100 (H)     MCH 08/18/2024 32.0     MCHC 08/18/2024 32.0     RDW 08/18/2024 12.9     Platelets 08/18/2024 279     MPV 08/18/2024 11.2     PTT 08/18/2024 74 (H)     Sodium 08/18/2024 147     Potassium 08/18/2024 3.8     Chloride 08/18/2024 109 (H)     CO2 08/18/2024 27     ANION GAP 08/18/2024 11     BUN 08/18/2024 45 (H)     Creatinine 08/18/2024 1.70 (H)     Glucose 08/18/2024 170 (H)     Calcium 08/18/2024 8.4     eGFR 08/18/2024 38     PTT 08/18/2024 36 (H)     PTT 08/18/2024 37 (H)     PTT 08/19/2024 >210 (HH)     Sodium 08/19/2024 147     Potassium 08/19/2024 3.3 (L)     Chloride 08/19/2024 110 (H)     CO2 08/19/2024 22     ANION GAP 08/19/2024 15 (H)     BUN 08/19/2024 42 (H)     Creatinine 08/19/2024 1.47 (H)     Glucose 08/19/2024 202 (H)     Calcium 08/19/2024 7.9 (L)     eGFR 08/19/2024 45     WBC 08/19/2024 17.98 (H)     RBC 08/19/2024 4.21     Hemoglobin 08/19/2024 13.5     Hematocrit 08/19/2024 41.4     MCV 08/19/2024 98     MCH 08/19/2024 32.1     MCHC 08/19/2024 32.6     RDW 08/19/2024 13.1     Platelets 08/19/2024 358     MPV 08/19/2024 11.5     PTT 08/19/2024 169 (HH)     Sodium 08/19/2024 147     Potassium 08/19/2024 3.5     Chloride 08/19/2024 111 (H)     CO2 08/19/2024 19 (L)     ANION GAP 08/19/2024 17 (H)     BUN 08/19/2024 52 (H)     Creatinine 08/19/2024 1.84 (H)     Glucose 08/19/2024 201 (H)     Calcium 08/19/2024 7.6 (L)     eGFR 08/19/2024 34     PTT 08/19/2024 140 (HH)     PTT 08/20/2024 100 (H)     Sodium 08/20/2024 144     Potassium 08/20/2024 3.6     Chloride 08/20/2024 110 (H)     CO2 08/20/2024 18 (L)     ANION GAP 08/20/2024 16  (H)     BUN 08/20/2024 62 (H)     Creatinine 08/20/2024 2.34 (H)     Glucose 08/20/2024 202 (H)     Calcium 08/20/2024 7.5 (L)     eGFR 08/20/2024 26     WBC 08/20/2024 21.85 (H)     RBC 08/20/2024 4.40     Hemoglobin 08/20/2024 13.8     Hematocrit 08/20/2024 43.0     MCV 08/20/2024 98     MCH 08/20/2024 31.4     MCHC 08/20/2024 32.1     RDW 08/20/2024 13.2     MPV 08/20/2024 10.9     Platelets 08/20/2024 407 (H)     nRBC 08/20/2024 0     Segmented % 08/20/2024 87 (H)     Immature Grans % 08/20/2024 1     Lymphocytes % 08/20/2024 4 (L)     Monocytes % 08/20/2024 8     Eosinophils Relative 08/20/2024 0     Basophils Relative 08/20/2024 0     Absolute Neutrophils 08/20/2024 19.07 (H)     Absolute Immature Grans 08/20/2024 0.15     Absolute Lymphocytes 08/20/2024 0.82     Absolute Monocytes 08/20/2024 1.71 (H)     Eosinophils Absolute 08/20/2024 0.05     Basophils Absolute 08/20/2024 0.05     LACTIC ACID 08/20/2024 2.7 (H)     Procalcitonin 08/20/2024 2.28 (H)     LACTIC ACID 08/20/2024 2.7 (H)        Imaging Studies:  VAS VENOUS DUPLEX - LOWER LIMB BILATERAL    Result Date: 8/19/2024  Narrative:  [facility] REPORT [facility]INICAL: Indications: DVT was seen during a CT Scan.  Physician wants to R/O DVT. Risk Factors The patient has history of HTN.  FINDINGS:  Right      Thrombus           CFV        Acute - Occlusive  FV Prox    Acute               Left       Thrombus           Ext_Iliac  Acute              CFV        Acute              FV Prox    Acute              FV Mid     Acute              FV Dist    Acute              Popliteal  Acute              Peroneal   Acute - Occlusive     CONCLUSION: Impression: RIGHT LOWER LIMB: Evaluation shows acute non-occlusive thrombus in the common femoral vein and proximal superficial femoral vein. No evidence of superficial thrombophlebitis noted. Doppler evaluation shows a normal response to augmentation maneuvers.. Popliteal, posterior tibial and anterior tibial arterial  Doppler waveform's are triphasic. LEFT LOWER LIMB: Evaluation shows acute non-occlusive thrombus in the distal external iliac vein, common femoral vein, superficial femoral vein, popliteal vein and occlusive thrombus in one of the paired peroneal veins. No evidence of superficial thrombophlebitis noted. Doppler evaluation shows a normal response to augmentation maneuvers. Popliteal, posterior tibial and anterior tibial arterial Doppler waveform's are triphasic.  Technical findings were given to Daisy Holden MD.  Aug 19 2024  9:31AM  SIGNATURE: Electronically Signed by: EARLE OLIVEIRA on 2024-08-19 03:26:52 PM    CT head wo contrast    Result Date: 8/17/2024  Narrative: CT BRAIN - WITHOUT CONTRAST INDICATION:   History of subdural hematoma last month patient requires anticoagulation. COMPARISON: CT head without contrast 7/16/2024, 7/2/2024, 7/1/2024, 10/8/2023. MRI brain without contrast 6/12/2023. TECHNIQUE:  CT examination of the brain was performed.  Multiplanar 2D reformatted images were created from the source data. Radiation dose length product (DLP) for this visit:  966.1 mGy-cm .  This examination, like all CT scans performed in the [facility], was performed utilizing techniques to minimize radiation dose exposure, including the use of iterative reconstruction and automated exposure control. IMAGE QUALITY:  Diagnostic. FINDINGS: PARENCHYMA: Decreased attenuation is noted in periventricular and subcortical white matter demonstrating an appearance that is statistically most likely to represent advanced microangiopathic change. No CT signs of acute infarction.  No intracranial mass, mass effect or midline shift.  No acute parenchymal hemorrhage. Arterial calcifications of carotid siphons an left intradural vertebral artery. VENTRICLES AND EXTRA-AXIAL SPACES:  Ventricles and extra-axial CSF spaces are prominent commensurate with the degree of volume loss.  No hydrocephalus.  No  acute intraventricular or extra-axial hemorrhage. VISUALIZED ORBITS: Normal visualized orbits. PARANASAL SINUSES: Normal visualized paranasal sinuses. CALVARIUM AND EXTRACRANIAL SOFT TISSUES:  Normal.     Impression: No acute intracranial abnormality. No acute intracranial hemorrhage, as clinically questioned. Workstation performed: [id number]     CT abdomen pelvis wo contrast    Result Date: 8/17/2024  Narrative: CT ABDOMEN AND PELVIS WITHOUT IV CONTRAST INDICATION:   Constipation x 5 days, LOWELL. History of hernia repair. COMPARISON:  None. TECHNIQUE:  CT examination of the abdomen and pelvis was performed.   Axial, sagittal, and coronal 2D reformatted images were created from the source data and submitted for interpretation. Radiation dose length product (DLP) for this visit:  710.3 mGy-cm .  This examination, like all CT scans performed in the [facility], was performed utilizing techniques to minimize radiation dose exposure, including the use of iterative reconstruction and automated exposure motion artifact from the patient's arms degrade images. IV Contrast: Not given. Enteric Contrast:  Enteric contrast was not administered. Image quality: FINDINGS: ABDOMEN LOWER CHEST: Dependent and linear atelectasis. Moderate to large hiatal hernia. Moderate to marked coronary artery calcification and aortic annular calcification. LIVER/BILIARY TREE:  Within normal limits. GALLBLADDER:  Within normal limits. SPLEEN:  Within normal limits. PANCREAS:  Within normal limits. ADRENAL GLANDS:  Within normal limits. KIDNEYS/URETERS: Grossly simple right upper pole cortical cyst. Bilateral, symmetric moderate hydronephrosis. Ureters are dilated to the level of distended colon to be described below. No ureteral stone or perinephric fluid. STOMACH AND BOWEL: Long segment, dilated rectosigmoid colon, distended with stool. Mild wall thickening. Presacral fatty stranding. No pneumatosis or portal venous gas. Sigmoid  diverticulosis without findings of acute diverticulitis. Hiatal hernia mentioned above. Otherwise stomach and small bowel are within normal limits. APPENDIX: Not visualized.  No secondary signs of appendicitis. ABDOMINOPELVIC CAVITY:  No abnormal air, fluid or enlarged lymph nodes. VESSELS: Limited evaluation without IV contrast.  Normal aorta caliber. Atherosclerosis. Ectatic common iliac arteries. PELVIS: REPRODUCTIVE ORGANS: Prostate poorly visualized, probable mass effect from distended colon. URINARY BLADDER: Collapsed around a Lugo catheter. ABDOMINAL WALL/INGUINAL REGIONS: Asymmetric left groin edema. Surrounds the neurovascular bundle and muscles. Prominent lymph nodes measuring up to 1.4 cm (201/173). Masslike opacity anterior to the adductor muscles measuring at least 7.7 x 3.1 x 4.3 cm (length X depth X width). Slightly greater attenuation than muscle. Mildly enlarged left common femoral vein with mildly increased density compared to the right vein. Distended colon abuts the left external iliac vein without obvious significant mass effect. BONES:  Degenerative changes. Scoliosis. The study was marked in EPIC for immediate notification.     Impression: Evidence of severe constipation, probable rectosigmoid impaction. Secondary evidence of stercoral colitis. Distended colon results in moderate bilateral hydronephrosis. Evidence of inflammation in the left groin. Masslike opacity which may be a hematoma. Question findings of left common femoral vein thrombus, which could be secondary to compression or inflammation from distended colon. Correlate clinically with direct inspection. Initial imaging can be performed with ultrasound, MRI if needed. Workstation performed: [id number]

## 2024-08-20 NOTE — PROGRESS NOTES
"Progress Note - General Surgery   Jaspal Brownlee 74 y.o. male MRN: [id number]  Unit/Bed#: -01 Encounter: [id number]    Assessment:  73yo male whom is wheelchair bound with history of dementia and subdural hemorrhage s/p fall from chair last month, presenting with constipation.   CT on admission revealed b/l hydronephrosis, hiatal hernia, stercoral colitis with recto-sigmoid junction measuring 13-14 cm in the setting of fecal impaction up to the transverse colon, and masslike opacity anterior to adductor muscles in left groin.   Patient appears uncomfortable with increased work of breathing. Leukocytosis and LOWELL continue to worsen.  Lactic is mildly elevated at 2.7  UOP 0.4  Has received fleets enema & dulcolax suppository over the past 24 hrs      Plan:  CT from 8/17 reviewed   Continue NPO status for now  ?Abdominal U/S pending for left groin opacity?  Lugo in place for urinary retention   GI consult pending, need to consider colonoscopic disimpaction if manual disimpaction has been unsuccessful   Currently on cefepime and vancomycin       Subjective/Objective     Subjective:  patient non verbal    Objective:     Blood pressure 121/73, pulse 100, temperature 99.7 °F (37.6 °C), temperature source Axillary, resp. rate 21, height 5' 9\" (1.753 m), weight 82 kg (180 lb 12.4 oz), SpO2 92%.,Body mass index is 26.7 kg/m².      Intake/Output Summary (Last 24 hours) at 8/20/2024 0725  Last data filed at 8/20/2024 0622  Gross per 24 hour   Intake 3000 ml   Output 800 ml   Net 2200 ml       Invasive Devices       Peripheral Intravenous Line  Duration             Peripheral IV 08/17/24 Left Arm 2 days    Peripheral IV 08/18/24 Dorsal (posterior);Left Hand 1 day              Drain  Duration             Urethral Catheter Non-latex 16 Fr. 2 days                    Physical Exam: /73 (BP Location: Right arm)   Pulse 100   Temp 97.7 °F (36.5 °C) (Axillary)   Resp 21   Ht 5' 9\" (1.753 m)   Wt 82 kg (180 lb 12.4 " oz)   SpO2 92%   BMI 26.70 kg/m²   General appearance:  pale, uncomfortable, GCS 10  Head: Normocephalic, without obvious abnormality, atraumatic  Lungs:  tachypneic, short shallow breaths  Heart:  regular rate  Abdomen:  abdomen is somewhat hard, no tympany to percussion, hypoactive bowel sounds, moderately distended        Lab, Imaging and other studies:I have personally reviewed pertinent lab results.  , CBC:   Lab Results   Component Value Date    WBC 21.85 (H) 08/20/2024    HGB 13.8 08/20/2024    HCT 43.0 08/20/2024    MCV 98 08/20/2024     (H) 08/20/2024    RBC 4.40 08/20/2024    MCH 31.4 08/20/2024    MCHC 32.1 08/20/2024    RDW 13.2 08/20/2024    MPV 10.9 08/20/2024    NRBC 0 08/20/2024   , CMP:   Lab Results   Component Value Date    SODIUM 144 08/20/2024    K 3.6 08/20/2024     (H) 08/20/2024    CO2 18 (L) 08/20/2024    BUN 62 (H) 08/20/2024    CREATININE 2.34 (H) 08/20/2024    CALCIUM 7.5 (L) 08/20/2024    EGFR 26 08/20/2024     VTE Pharmacologic Prophylaxis: Reason for no pharmacologic prophylaxis hematuria? per primary team  VTE Mechanical Prophylaxis: sequential compression device

## 2024-08-20 NOTE — PROGRESS NOTES
"Jaspal Brownlee is a 74 y.o. male who is currently ordered Vancomycin IV with management by the Pharmacy Consult service.  Vancomycin Assessment    Jaspal Brownlee is a 74 y.o. male who is currently receiving vancomycin   for Soft tissue (goal -600, trough >10), Urinary tract infection (goal -600, trough >10)   .    Relevant clinical data and objective history reviewed:  Creatinine   Date Value Ref Range Status   08/20/2024 2.34 (H) 0.60 - 1.30 mg/dL Final     Comment:     Standardized to IDMS reference method   08/19/2024 1.84 (H) 0.60 - 1.30 mg/dL Final     Comment:     Standardized to IDMS reference method   08/19/2024 1.47 (H) 0.60 - 1.30 mg/dL Final     Comment:     Standardized to IDMS reference method   07/19/2023 1.01 0.53 - 1.30 mg/dL Final   07/18/2023 1.10 0.53 - 1.30 mg/dL Final   07/16/2023 0.97 0.53 - 1.30 mg/dL Final     BP 99/69 (BP Location: Right arm)   Pulse 102   Temp 100.2 °F (37.9 °C) (Axillary)   Resp (!) 24   Ht 5' 9\" (1.753 m)   Wt 82 kg (180 lb 12.4 oz)   SpO2 95%   BMI 26.70 kg/m²   I/O last 3 completed shifts:  In: 566.5 [P.O.:360; I.V.:206.5]  Out: 1450 [Urine:1450]  Lab Results   Component Value Date/Time    BUN 62 (H) 08/20/2024 02:29 AM    BUN 25 07/19/2023 06:36 AM    WBC 21.85 (H) 08/20/2024 02:29 AM    HGB 13.8 08/20/2024 02:29 AM    HCT 43.0 08/20/2024 02:29 AM    MCV 98 08/20/2024 02:29 AM     (H) 08/20/2024 02:29 AM     Temp Readings from Last 3 Encounters:   08/20/24 100.2 °F (37.9 °C) (Axillary)   07/01/24 98.1 °F (36.7 °C) (Oral)   07/01/24 98.8 °F (37.1 °C) (Oral)     Vancomycin Days of Therapy: 1    Assessment/Plan  The patient is currently on vancomycin utilizing pulse dosing.  Baseline risks associated with therapy include: pre-existing renal impairment and advanced age.  The patient is receiving  vancomcyin 1000 mg daily prn pulse dosing for random trough level <15. .  Pharmacy will continue to follow closely for s/sx of nephrotoxicity, " infusion reactions, and appropriateness of therapy.  BMP and CBC will be ordered per protocol.  Plan for a random trough  at approximately 1600 8/20. Pharmacy will continue to follow the patient’s culture results and clinical progress daily.    Alexander Arndt, Pharmacist

## 2024-08-20 NOTE — PLAN OF CARE
Problem: Prexisting or High Potential for Compromised Skin Integrity  Goal: Skin integrity is maintained or improved  Description: INTERVENTIONS:  - Identify patients at risk for skin breakdown  - Assess and monitor skin integrity  - Assess and monitor nutrition and hydration status  - Monitor labs   - Assess for incontinence   - Turn and reposition patient  - Assist with mobility/ambulation  - Relieve pressure over bony prominences  - Avoid friction and shearing  - Provide appropriate hygiene as needed including keeping skin clean and dry  - Evaluate need for skin moisturizer/barrier cream  - Collaborate with interdisciplinary team   - Patient/family teaching  - Consider wound care consult   Outcome: Progressing     Problem: PAIN - ADULT  Goal: Verbalizes/displays adequate comfort level or baseline comfort level  Description: Interventions:  - Encourage patient to monitor pain and request assistance  - Assess pain using appropriate pain scale  - Administer analgesics based on type and severity of pain and evaluate response  - Implement non-pharmacological measures as appropriate and evaluate response  - Consider cultural and social influences on pain and pain management  - Notify physician/advanced practitioner if interventions unsuccessful or patient reports new pain  Outcome: Progressing     Problem: NEUROSENSORY - ADULT  Goal: Achieves stable or improved neurological status  Description: INTERVENTIONS  - Monitor and report changes in neurological status  - Monitor vital signs such as temperature, blood pressure, glucose, and any other labs ordered   - Initiate measures to prevent increased intracranial pressure  - Monitor for seizure activity and implement precautions if appropriate      Outcome: Progressing     Problem: CARDIOVASCULAR - ADULT  Goal: Maintains optimal cardiac output and hemodynamic stability  Description: INTERVENTIONS:  - Monitor I/O, vital signs and rhythm  - Monitor for S/S and trends of  decreased cardiac output  - Administer and titrate ordered vasoactive medications to optimize hemodynamic stability  - Assess quality of pulses, skin color and temperature  - Assess for signs of decreased coronary artery perfusion  - Instruct patient to report change in severity of symptoms  Outcome: Progressing  Goal: Absence of cardiac dysrhythmias or at baseline rhythm  Description: INTERVENTIONS:  - Continuous cardiac monitoring, vital signs, obtain 12 lead EKG if ordered  - Administer antiarrhythmic and heart rate control medications as ordered  - Monitor electrolytes and administer replacement therapy as ordered  Outcome: Progressing     Problem: GASTROINTESTINAL - ADULT  Goal: Minimal or absence of nausea and/or vomiting  Description: INTERVENTIONS:  - Administer IV fluids if ordered to ensure adequate hydration  - Maintain NPO status until nausea and vomiting are resolved  - Nasogastric tube if ordered  - Administer ordered antiemetic medications as needed  - Provide nonpharmacologic comfort measures as appropriate  - Advance diet as tolerated, if ordered  - Consider nutrition services referral to assist patient with adequate nutrition and appropriate food choices  Outcome: Progressing  Goal: Maintains or returns to baseline bowel function  Description: INTERVENTIONS:  - Assess bowel function  - Encourage oral fluids to ensure adequate hydration  - Administer IV fluids if ordered to ensure adequate hydration  - Administer ordered medications as needed  - Encourage mobilization and activity  - Consider nutritional services referral to assist patient with adequate nutrition and appropriate food choices  Outcome: Progressing  Goal: Maintains adequate nutritional intake  Description: INTERVENTIONS:  - Monitor percentage of each meal consumed  - Identify factors contributing to decreased intake, treat as appropriate  - Assist with meals as needed  - Monitor I&O, weight, and lab values if indicated  - Obtain  nutrition services referral as needed  Outcome: Progressing  Goal: Establish and maintain optimal ostomy function  Description: INTERVENTIONS:  - Assess bowel function  - Encourage oral fluids to ensure adequate hydration  - Administer IV fluids if ordered to ensure adequate hydration   - Administer ordered medications as needed  - Encourage mobilization and activity  - Nutrition services referral to assist patient with appropriate food choices  - Assess stoma site  - Consider wound care consult   Outcome: Progressing  Goal: Oral mucous membranes remain intact  Description: INTERVENTIONS  - Assess oral mucosa and hygiene practices  - Implement preventative oral hygiene regimen  - Implement oral medicated treatments as ordered  - Initiate Nutrition services referral as needed  Outcome: Progressing     Problem: GENITOURINARY - ADULT  Goal: Maintains or returns to baseline urinary function  Description: INTERVENTIONS:  - Assess urinary function  - Encourage oral fluids to ensure adequate hydration if ordered  - Administer IV fluids as ordered to ensure adequate hydration  - Administer ordered medications as needed  - Offer frequent toileting  - Follow urinary retention protocol if ordered  Outcome: Progressing  Goal: Absence of urinary retention  Description: INTERVENTIONS:  - Assess patient’s ability to void and empty bladder  - Monitor I/O  - Bladder scan as needed  - Discuss with physician/AP medications to alleviate retention as needed  - Discuss catheterization for long term situations as appropriate  Outcome: Progressing  Goal: Urinary catheter remains patent  Description: INTERVENTIONS:  - Assess patency of urinary catheter  - If patient has a chronic eller, consider changing catheter if non-functioning  - Follow guidelines for intermittent irrigation of non-functioning urinary catheter  Outcome: Progressing     Problem: MUSCULOSKELETAL - ADULT  Goal: Maintain or return mobility to safest level of  function  Description: INTERVENTIONS:  - Assess patient's ability to carry out ADLs; assess patient's baseline for ADL function and identify physical deficits which impact ability to perform ADLs (bathing, care of mouth/teeth, toileting, grooming, dressing, etc.)  - Assess/evaluate cause of self-care deficits   - Assess range of tyrese  - Assess patient's mobility  - Assess patient's need for assistive devices and provide as appropriate  - Encourage maximum independence but intervene and supervise when necessary  - Involve family in performance of ADLs  - Assess for home care needs following discharge   - Consider OT consult to assist with ADL evaluation and planning for discharge  - Provide patient education as appropriate  Outcome: Progressing  Goal: Maintain proper alignment of affected body part  Description: INTERVENTIONS:  - Support, maintain and protect limb and body alignment  - Provide patient/ family with appropriate education  Outcome: Progressing

## 2024-08-20 NOTE — QUICK NOTE
Received sepsis zero alert on Mr. Brownlee. Chart was reviewed and noted increased leukocytosis, vital signs reflect fever of 101.4, tachycardia and tachypnea. Also noted worsening of kidney function, the patient meets severe sepsis criteria  as evidence of tachycardia, tachypnea, fever, increased leukocytosis and worsening in kidney function with a possible source bowel obstruction vs. Cystitis or a combination of both. Lactic and procalcitonin are elevated. EKG shows sinus tachycardia.    Plan:    - Administered fluids 30 mg x kg as ordered  - Ceftriaxone D/C start vancomycin and zosyn   - IV tylenol for fever and pain  - Blood cultures ordered, awaiting for results  - Obtain lactic 2 hrs  - Check next day procal (8/21/24)  -Monitor CBC trend daily  - Monitor kidney function with daily BMP  -Monitor VS per unit protocol or when needed, avoid hypotension  -Follow up with [facility] final results

## 2024-08-21 LAB — MRSA NOSE QL CULT: NORMAL

## 2024-08-21 NOTE — NURSING NOTE
Patient discharged via stretcher by Transport team. Patient presented lethargic and exhibited noisy, labored breathing accompanied  by rattling sounds. Patient left our facility alive.

## 2024-08-22 LAB
ATRIAL RATE: 117 BPM
P AXIS: 33 DEGREES
PR INTERVAL: 176 MS
QRS AXIS: -17 DEGREES
QRSD INTERVAL: 84 MS
QT INTERVAL: 328 MS
QTC INTERVAL: 457 MS
T WAVE AXIS: 44 DEGREES
VENTRICULAR RATE: 117 BPM

## 2024-08-22 PROCEDURE — 93010 ELECTROCARDIOGRAM REPORT: CPT | Performed by: INTERNAL MEDICINE

## 2024-08-25 LAB
BACTERIA BLD CULT: NORMAL
BACTERIA BLD CULT: NORMAL